# Patient Record
Sex: MALE | Race: WHITE | Employment: OTHER | URBAN - METROPOLITAN AREA
[De-identification: names, ages, dates, MRNs, and addresses within clinical notes are randomized per-mention and may not be internally consistent; named-entity substitution may affect disease eponyms.]

---

## 2017-05-24 ENCOUNTER — RECORDS - HEALTHEAST (OUTPATIENT)
Dept: LAB | Facility: CLINIC | Age: 62
End: 2017-05-24

## 2017-05-25 LAB
ALT SERPL W P-5'-P-CCNC: 15 U/L (ref 0–45)
AST SERPL W P-5'-P-CCNC: 14 U/L (ref 0–40)
CREAT SERPL-MCNC: 0.6 MG/DL (ref 0.7–1.3)
GFR SERPL CREATININE-BSD FRML MDRD: >60 ML/MIN/1.73M2

## 2017-09-13 ENCOUNTER — RECORDS - HEALTHEAST (OUTPATIENT)
Dept: LAB | Facility: CLINIC | Age: 62
End: 2017-09-13

## 2017-09-14 LAB
CHOLEST SERPL-MCNC: 116 MG/DL
FASTING STATUS PATIENT QL REPORTED: NORMAL
HDLC SERPL-MCNC: 42 MG/DL
LDLC SERPL CALC-MCNC: 63 MG/DL
TRIGL SERPL-MCNC: 56 MG/DL

## 2018-01-19 ENCOUNTER — RECORDS - HEALTHEAST (OUTPATIENT)
Dept: LAB | Facility: CLINIC | Age: 63
End: 2018-01-19

## 2018-01-22 LAB — CALCIUM SERPL-MCNC: 9.3 MG/DL (ref 8.5–10.5)

## 2018-01-23 LAB — 25(OH)D3 SERPL-MCNC: 63.8 NG/ML (ref 30–80)

## 2018-02-23 ENCOUNTER — RECORDS - HEALTHEAST (OUTPATIENT)
Dept: LAB | Facility: CLINIC | Age: 63
End: 2018-02-23

## 2018-02-23 LAB
ANION GAP SERPL CALCULATED.3IONS-SCNC: 8 MMOL/L (ref 5–18)
BUN SERPL-MCNC: 14 MG/DL (ref 8–22)
CALCIUM SERPL-MCNC: 9.4 MG/DL (ref 8.5–10.5)
CHLORIDE BLD-SCNC: 102 MMOL/L (ref 98–107)
CO2 SERPL-SCNC: 28 MMOL/L (ref 22–31)
CREAT SERPL-MCNC: 0.62 MG/DL (ref 0.7–1.3)
GFR SERPL CREATININE-BSD FRML MDRD: >60 ML/MIN/1.73M2
GLUCOSE BLD-MCNC: 79 MG/DL (ref 70–125)
HGB BLD-MCNC: 13.4 G/DL (ref 14–18)
POTASSIUM BLD-SCNC: 3.8 MMOL/L (ref 3.5–5)
SODIUM SERPL-SCNC: 138 MMOL/L (ref 136–145)

## 2018-03-15 ENCOUNTER — RECORDS - HEALTHEAST (OUTPATIENT)
Dept: LAB | Facility: CLINIC | Age: 63
End: 2018-03-15

## 2018-03-16 LAB
25(OH)D3 SERPL-MCNC: 57.7 NG/ML (ref 30–80)
CALCIUM SERPL-MCNC: 9.5 MG/DL (ref 8.5–10.5)

## 2018-03-22 NOTE — TELEPHONE ENCOUNTER
"Requested Prescriptions   Pending Prescriptions Disp Refills     cholecalciferol (VITAMIN D3) 5000 UNITS CAPS capsule [Pharmacy Med Name: Vitamin D3 Oral Capsule 5000 UNIT] 100 capsule 4    Last Written Prescription Date:  na  Last Fill Quantity: na,  # refills: na   Last office visit: No previous visit found with prescribing provider:     Future Office Visit:     Sig: TAKE ONE CAPSULE BY MOUTH EVERY DAY IN THE MORNING    Vitamin Supplements (Adult) Protocol Failed    3/22/2018  2:42 PM       Failed - Recent (12 mo) or future (30 days) visit within the authorizing provider's specialty    Patient had office visit in the last 12 months or has a visit in the next 30 days with authorizing provider or within the authorizing provider's specialty.  See \"Patient Info\" tab in inbasket, or \"Choose Columns\" in Meds & Orders section of the refill encounter.           Passed - High dose Vitamin D not ordered        aspirin 81 MG EC tablet [Pharmacy Med Name: Aspirin Oral Tablet Delayed Release 81 MG] 120 tablet 0      Last Written Prescription Date:  na  Last Fill Quantity: na,  # refills: na   Last office visit: No previous visit found with prescribing provider:     Future Office Visit:     Sig: TAKE ONE TABLET BY MOUTH EVERY DAY    Analgesics (Non-Narcotic Tylenol and ASA Only) Failed    3/22/2018  2:42 PM       Failed - Recent (12 mo) or future (30 days) visit within the authorizing provider's specialty    Patient had office visit in the last 12 months or has a visit in the next 30 days with authorizing provider or within the authorizing provider's specialty.  See \"Patient Info\" tab in inbasket, or \"Choose Columns\" in Meds & Orders section of the refill encounter.           Passed - Patient is age 20 years or older    If ASA is flagged for ages under 20 years old. Forward to provider for confirmation Ryes Syndrome is not a concern.                "

## 2018-03-22 NOTE — TELEPHONE ENCOUNTER
"Requested Prescriptions   Pending Prescriptions Disp Refills     cholecalciferol (VITAMIN D3) 5000 UNITS CAPS capsule [Pharmacy Med Name: Vitamin D3 Oral Capsule 5000 UNIT] 100 capsule 4     Sig: TAKE ONE CAPSULE BY MOUTH EVERY DAY IN THE MORNING    Vitamin Supplements (Adult) Protocol Failed    3/22/2018  1:53 PM       Failed - Recent (12 mo) or future (30 days) visit within the authorizing provider's specialty    Patient had office visit in the last 12 months or has a visit in the next 30 days with authorizing provider or within the authorizing provider's specialty.  See \"Patient Info\" tab in inbasket, or \"Choose Columns\" in Meds & Orders section of the refill encounter.           Passed - High dose Vitamin D not ordered        aspirin 81 MG EC tablet [Pharmacy Med Name: Aspirin Oral Tablet Delayed Release 81 MG] 120 tablet 0     Sig: TAKE ONE TABLET BY MOUTH EVERY DAY    Analgesics (Non-Narcotic Tylenol and ASA Only) Failed    3/22/2018  1:53 PM       Failed - Recent (12 mo) or future (30 days) visit within the authorizing provider's specialty    Patient had office visit in the last 12 months or has a visit in the next 30 days with authorizing provider or within the authorizing provider's specialty.  See \"Patient Info\" tab in inbasket, or \"Choose Columns\" in Meds & Orders section of the refill encounter.           Passed - Patient is age 20 years or older    If ASA is flagged for ages under 20 years old. Forward to provider for confirmation Ryes Syndrome is not a concern.                "

## 2018-03-22 NOTE — TELEPHONE ENCOUNTER
This patient has never been seen at our clinic.  Pharmacy advised.    Gloria Amaral, BS, RN, [facility]) [phone]

## 2018-03-23 NOTE — TELEPHONE ENCOUNTER
"Requested Prescriptions   Pending Prescriptions Disp Refills     cholecalciferol (VITAMIN D3) 5000 UNITS CAPS capsule [Pharmacy Med Name: Vitamin D3 Oral Capsule 5000 UNIT] 100 capsule 4     Sig: TAKE ONE CAPSULE BY MOUTH EVERY DAY IN THE MORNING    Last Refill: N/A    Vitamin Supplements (Adult) Protocol Failed    3/23/2018  1:45 PM       Failed - Recent (12 mo) or future (30 days) visit within the authorizing provider's specialty    Patient had office visit in the last 12 months or has a visit in the next 30 days with authorizing provider or within the authorizing provider's specialty.  See \"Patient Info\" tab in inbasket, or \"Choose Columns\" in Meds & Orders section of the refill encounter.           Passed - High dose Vitamin D not ordered        This patient has never been seen at at this clinic - pharmacy notified    Chelsey Wilson RN  [place] [facility]    "

## 2018-03-26 ENCOUNTER — RECORDS - HEALTHEAST (OUTPATIENT)
Dept: LAB | Facility: CLINIC | Age: 63
End: 2018-03-26

## 2018-03-26 LAB
ALT SERPL W P-5'-P-CCNC: 80 U/L (ref 0–45)
AST SERPL W P-5'-P-CCNC: 43 U/L (ref 0–40)
BASOPHILS # BLD AUTO: 0 THOU/UL (ref 0–0.2)
BASOPHILS NFR BLD AUTO: 1 % (ref 0–2)
CREAT SERPL-MCNC: 0.58 MG/DL (ref 0.7–1.3)
EOSINOPHIL # BLD AUTO: 0.3 THOU/UL (ref 0–0.4)
EOSINOPHIL NFR BLD AUTO: 6 % (ref 0–6)
ERYTHROCYTE [DISTWIDTH] IN BLOOD BY AUTOMATED COUNT: 14.8 % (ref 11–14.5)
GFR SERPL CREATININE-BSD FRML MDRD: >60 ML/MIN/1.73M2
HCT VFR BLD AUTO: 40.6 % (ref 40–54)
HGB BLD-MCNC: 13.2 G/DL (ref 14–18)
LYMPHOCYTES # BLD AUTO: 1.1 THOU/UL (ref 0.8–4.4)
LYMPHOCYTES NFR BLD AUTO: 23 % (ref 20–40)
MCH RBC QN AUTO: 30.4 PG (ref 27–34)
MCHC RBC AUTO-ENTMCNC: 32.5 G/DL (ref 32–36)
MCV RBC AUTO: 94 FL (ref 80–100)
MONOCYTES # BLD AUTO: 0.6 THOU/UL (ref 0–0.9)
MONOCYTES NFR BLD AUTO: 12 % (ref 2–10)
NEUTROPHILS # BLD AUTO: 2.9 THOU/UL (ref 2–7.7)
NEUTROPHILS NFR BLD AUTO: 59 % (ref 50–70)
PLATELET # BLD AUTO: 232 THOU/UL (ref 140–440)
PMV BLD AUTO: 9.5 FL (ref 8.5–12.5)
RBC # BLD AUTO: 4.34 MILL/UL (ref 4.4–6.2)
WBC: 5 THOU/UL (ref 4–11)

## 2018-05-31 ENCOUNTER — RECORDS - HEALTHEAST (OUTPATIENT)
Dept: LAB | Facility: CLINIC | Age: 63
End: 2018-05-31

## 2018-06-01 LAB
ANION GAP SERPL CALCULATED.3IONS-SCNC: 10 MMOL/L (ref 5–18)
BASOPHILS # BLD AUTO: 0.1 THOU/UL (ref 0–0.2)
BASOPHILS NFR BLD AUTO: 1 % (ref 0–2)
BUN SERPL-MCNC: 16 MG/DL (ref 8–22)
CALCIUM SERPL-MCNC: 9.6 MG/DL (ref 8.5–10.5)
CHLORIDE BLD-SCNC: 98 MMOL/L (ref 98–107)
CO2 SERPL-SCNC: 29 MMOL/L (ref 22–31)
CREAT SERPL-MCNC: 0.66 MG/DL (ref 0.7–1.3)
EOSINOPHIL # BLD AUTO: 0.2 THOU/UL (ref 0–0.4)
EOSINOPHIL NFR BLD AUTO: 3 % (ref 0–6)
ERYTHROCYTE [DISTWIDTH] IN BLOOD BY AUTOMATED COUNT: 15.3 % (ref 11–14.5)
GFR SERPL CREATININE-BSD FRML MDRD: >60 ML/MIN/1.73M2
GLUCOSE BLD-MCNC: 136 MG/DL (ref 70–125)
HCT VFR BLD AUTO: 39.1 % (ref 40–54)
HGB BLD-MCNC: 12.3 G/DL (ref 14–18)
LYMPHOCYTES # BLD AUTO: 1 THOU/UL (ref 0.8–4.4)
LYMPHOCYTES NFR BLD AUTO: 13 % (ref 20–40)
MCH RBC QN AUTO: 29.4 PG (ref 27–34)
MCHC RBC AUTO-ENTMCNC: 31.5 G/DL (ref 32–36)
MCV RBC AUTO: 93 FL (ref 80–100)
MONOCYTES # BLD AUTO: 0.9 THOU/UL (ref 0–0.9)
MONOCYTES NFR BLD AUTO: 12 % (ref 2–10)
NEUTROPHILS # BLD AUTO: 5.2 THOU/UL (ref 2–7.7)
NEUTROPHILS NFR BLD AUTO: 72 % (ref 50–70)
PLATELET # BLD AUTO: 493 THOU/UL (ref 140–440)
PMV BLD AUTO: 9.3 FL (ref 8.5–12.5)
POTASSIUM BLD-SCNC: 4.2 MMOL/L (ref 3.5–5)
RBC # BLD AUTO: 4.19 MILL/UL (ref 4.4–6.2)
SODIUM SERPL-SCNC: 137 MMOL/L (ref 136–145)
WBC: 7.5 THOU/UL (ref 4–11)

## 2018-06-06 ENCOUNTER — RECORDS - HEALTHEAST (OUTPATIENT)
Dept: LAB | Facility: CLINIC | Age: 63
End: 2018-06-06

## 2018-06-06 LAB
ANION GAP SERPL CALCULATED.3IONS-SCNC: 9 MMOL/L (ref 5–18)
BUN SERPL-MCNC: 14 MG/DL (ref 8–22)
CALCIUM SERPL-MCNC: 9.6 MG/DL (ref 8.5–10.5)
CHLORIDE BLD-SCNC: 103 MMOL/L (ref 98–107)
CO2 SERPL-SCNC: 25 MMOL/L (ref 22–31)
CREAT SERPL-MCNC: 0.62 MG/DL (ref 0.7–1.3)
GFR SERPL CREATININE-BSD FRML MDRD: >60 ML/MIN/1.73M2
GLUCOSE BLD-MCNC: 93 MG/DL (ref 70–125)
POTASSIUM BLD-SCNC: 4.1 MMOL/L (ref 3.5–5)
SODIUM SERPL-SCNC: 137 MMOL/L (ref 136–145)

## 2018-06-12 ENCOUNTER — RECORDS - HEALTHEAST (OUTPATIENT)
Dept: LAB | Facility: CLINIC | Age: 63
End: 2018-06-12

## 2018-06-13 ENCOUNTER — RECORDS - HEALTHEAST (OUTPATIENT)
Dept: LAB | Facility: CLINIC | Age: 63
End: 2018-06-13

## 2018-06-13 LAB
25(OH)D3 SERPL-MCNC: 45.8 NG/ML (ref 30–80)
ANION GAP SERPL CALCULATED.3IONS-SCNC: 8 MMOL/L (ref 5–18)
BUN SERPL-MCNC: 13 MG/DL (ref 8–22)
CALCIUM SERPL-MCNC: 9.6 MG/DL (ref 8.5–10.5)
CHLORIDE BLD-SCNC: 101 MMOL/L (ref 98–107)
CO2 SERPL-SCNC: 28 MMOL/L (ref 22–31)
CREAT SERPL-MCNC: 0.59 MG/DL (ref 0.7–1.3)
GFR SERPL CREATININE-BSD FRML MDRD: >60 ML/MIN/1.73M2
GLUCOSE BLD-MCNC: 131 MG/DL (ref 70–125)
POTASSIUM BLD-SCNC: 4.3 MMOL/L (ref 3.5–5)
SODIUM SERPL-SCNC: 137 MMOL/L (ref 136–145)

## 2018-06-19 ENCOUNTER — RECORDS - HEALTHEAST (OUTPATIENT)
Dept: LAB | Facility: CLINIC | Age: 63
End: 2018-06-19

## 2018-06-20 LAB
ANION GAP SERPL CALCULATED.3IONS-SCNC: 8 MMOL/L (ref 5–18)
BUN SERPL-MCNC: 16 MG/DL (ref 8–22)
CALCIUM SERPL-MCNC: 9.4 MG/DL (ref 8.5–10.5)
CHLORIDE BLD-SCNC: 101 MMOL/L (ref 98–107)
CO2 SERPL-SCNC: 29 MMOL/L (ref 22–31)
CREAT SERPL-MCNC: 0.58 MG/DL (ref 0.7–1.3)
GFR SERPL CREATININE-BSD FRML MDRD: >60 ML/MIN/1.73M2
GLUCOSE BLD-MCNC: 121 MG/DL (ref 70–125)
POTASSIUM BLD-SCNC: 4.1 MMOL/L (ref 3.5–5)
SODIUM SERPL-SCNC: 138 MMOL/L (ref 136–145)

## 2018-06-27 ENCOUNTER — RECORDS - HEALTHEAST (OUTPATIENT)
Dept: LAB | Facility: CLINIC | Age: 63
End: 2018-06-27

## 2018-06-27 LAB
ANION GAP SERPL CALCULATED.3IONS-SCNC: 8 MMOL/L (ref 5–18)
BUN SERPL-MCNC: 12 MG/DL (ref 8–22)
CALCIUM SERPL-MCNC: 9.6 MG/DL (ref 8.5–10.5)
CHLORIDE BLD-SCNC: 100 MMOL/L (ref 98–107)
CO2 SERPL-SCNC: 27 MMOL/L (ref 22–31)
CREAT SERPL-MCNC: 0.61 MG/DL (ref 0.7–1.3)
GFR SERPL CREATININE-BSD FRML MDRD: >60 ML/MIN/1.73M2
GLUCOSE BLD-MCNC: 86 MG/DL (ref 70–125)
POTASSIUM BLD-SCNC: 4.2 MMOL/L (ref 3.5–5)
SODIUM SERPL-SCNC: 135 MMOL/L (ref 136–145)

## 2018-07-02 ENCOUNTER — RECORDS - HEALTHEAST (OUTPATIENT)
Dept: LAB | Facility: CLINIC | Age: 63
End: 2018-07-02

## 2018-07-03 LAB
ANION GAP SERPL CALCULATED.3IONS-SCNC: 8 MMOL/L (ref 5–18)
BUN SERPL-MCNC: 13 MG/DL (ref 8–22)
CALCIUM SERPL-MCNC: 9.6 MG/DL (ref 8.5–10.5)
CHLORIDE BLD-SCNC: 100 MMOL/L (ref 98–107)
CO2 SERPL-SCNC: 26 MMOL/L (ref 22–31)
CREAT SERPL-MCNC: 0.57 MG/DL (ref 0.7–1.3)
GFR SERPL CREATININE-BSD FRML MDRD: >60 ML/MIN/1.73M2
GLUCOSE BLD-MCNC: 101 MG/DL (ref 70–125)
POTASSIUM BLD-SCNC: 4.1 MMOL/L (ref 3.5–5)
SODIUM SERPL-SCNC: 134 MMOL/L (ref 136–145)

## 2018-07-10 ENCOUNTER — RECORDS - HEALTHEAST (OUTPATIENT)
Dept: LAB | Facility: CLINIC | Age: 63
End: 2018-07-10

## 2018-07-11 LAB
ANION GAP SERPL CALCULATED.3IONS-SCNC: 8 MMOL/L (ref 5–18)
BUN SERPL-MCNC: 13 MG/DL (ref 8–22)
CALCIUM SERPL-MCNC: 9.4 MG/DL (ref 8.5–10.5)
CHLORIDE BLD-SCNC: 100 MMOL/L (ref 98–107)
CO2 SERPL-SCNC: 27 MMOL/L (ref 22–31)
CREAT SERPL-MCNC: 0.54 MG/DL (ref 0.7–1.3)
GFR SERPL CREATININE-BSD FRML MDRD: >60 ML/MIN/1.73M2
GLUCOSE BLD-MCNC: 91 MG/DL (ref 70–125)
POTASSIUM BLD-SCNC: 4.2 MMOL/L (ref 3.5–5)
SODIUM SERPL-SCNC: 135 MMOL/L (ref 136–145)

## 2018-07-17 ENCOUNTER — RECORDS - HEALTHEAST (OUTPATIENT)
Dept: LAB | Facility: CLINIC | Age: 63
End: 2018-07-17

## 2018-07-18 LAB
ANION GAP SERPL CALCULATED.3IONS-SCNC: 9 MMOL/L (ref 5–18)
BUN SERPL-MCNC: 12 MG/DL (ref 8–22)
CALCIUM SERPL-MCNC: 9.6 MG/DL (ref 8.5–10.5)
CHLORIDE BLD-SCNC: 97 MMOL/L (ref 98–107)
CO2 SERPL-SCNC: 26 MMOL/L (ref 22–31)
CREAT SERPL-MCNC: 0.59 MG/DL (ref 0.7–1.3)
GFR SERPL CREATININE-BSD FRML MDRD: >60 ML/MIN/1.73M2
GLUCOSE BLD-MCNC: 101 MG/DL (ref 70–125)
POTASSIUM BLD-SCNC: 4.2 MMOL/L (ref 3.5–5)
SODIUM SERPL-SCNC: 132 MMOL/L (ref 136–145)

## 2018-07-24 ENCOUNTER — RECORDS - HEALTHEAST (OUTPATIENT)
Dept: LAB | Facility: CLINIC | Age: 63
End: 2018-07-24

## 2018-07-25 LAB
ANION GAP SERPL CALCULATED.3IONS-SCNC: 8 MMOL/L (ref 5–18)
BUN SERPL-MCNC: 11 MG/DL (ref 8–22)
CALCIUM SERPL-MCNC: 9.5 MG/DL (ref 8.5–10.5)
CHLORIDE BLD-SCNC: 99 MMOL/L (ref 98–107)
CO2 SERPL-SCNC: 27 MMOL/L (ref 22–31)
CREAT SERPL-MCNC: 0.56 MG/DL (ref 0.7–1.3)
GFR SERPL CREATININE-BSD FRML MDRD: >60 ML/MIN/1.73M2
GLUCOSE BLD-MCNC: 90 MG/DL (ref 70–125)
POTASSIUM BLD-SCNC: 3.9 MMOL/L (ref 3.5–5)
SODIUM SERPL-SCNC: 134 MMOL/L (ref 136–145)

## 2018-07-31 ENCOUNTER — RECORDS - HEALTHEAST (OUTPATIENT)
Dept: LAB | Facility: CLINIC | Age: 63
End: 2018-07-31

## 2018-08-01 LAB
ANION GAP SERPL CALCULATED.3IONS-SCNC: 7 MMOL/L (ref 5–18)
BUN SERPL-MCNC: 11 MG/DL (ref 8–22)
CALCIUM SERPL-MCNC: 9.4 MG/DL (ref 8.5–10.5)
CHLORIDE BLD-SCNC: 100 MMOL/L (ref 98–107)
CO2 SERPL-SCNC: 28 MMOL/L (ref 22–31)
CREAT SERPL-MCNC: 0.58 MG/DL (ref 0.7–1.3)
GFR SERPL CREATININE-BSD FRML MDRD: >60 ML/MIN/1.73M2
GLUCOSE BLD-MCNC: 84 MG/DL (ref 70–125)
POTASSIUM BLD-SCNC: 4.4 MMOL/L (ref 3.5–5)
SODIUM SERPL-SCNC: 135 MMOL/L (ref 136–145)

## 2018-08-07 ENCOUNTER — RECORDS - HEALTHEAST (OUTPATIENT)
Dept: LAB | Facility: CLINIC | Age: 63
End: 2018-08-07

## 2018-08-08 LAB
ANION GAP SERPL CALCULATED.3IONS-SCNC: 10 MMOL/L (ref 5–18)
BUN SERPL-MCNC: 8 MG/DL (ref 8–22)
CALCIUM SERPL-MCNC: 9.5 MG/DL (ref 8.5–10.5)
CHLORIDE BLD-SCNC: 100 MMOL/L (ref 98–107)
CO2 SERPL-SCNC: 26 MMOL/L (ref 22–31)
CREAT SERPL-MCNC: 0.58 MG/DL (ref 0.7–1.3)
GFR SERPL CREATININE-BSD FRML MDRD: >60 ML/MIN/1.73M2
GLUCOSE BLD-MCNC: 89 MG/DL (ref 70–125)
POTASSIUM BLD-SCNC: 4 MMOL/L (ref 3.5–5)
SODIUM SERPL-SCNC: 136 MMOL/L (ref 136–145)

## 2018-08-14 ENCOUNTER — RECORDS - HEALTHEAST (OUTPATIENT)
Dept: LAB | Facility: CLINIC | Age: 63
End: 2018-08-14

## 2018-08-15 LAB
ANION GAP SERPL CALCULATED.3IONS-SCNC: 10 MMOL/L (ref 5–18)
BUN SERPL-MCNC: 9 MG/DL (ref 8–22)
CALCIUM SERPL-MCNC: 9.3 MG/DL (ref 8.5–10.5)
CHLORIDE BLD-SCNC: 102 MMOL/L (ref 98–107)
CO2 SERPL-SCNC: 25 MMOL/L (ref 22–31)
CREAT SERPL-MCNC: 0.55 MG/DL (ref 0.7–1.3)
GFR SERPL CREATININE-BSD FRML MDRD: >60 ML/MIN/1.73M2
GLUCOSE BLD-MCNC: 90 MG/DL (ref 70–125)
POTASSIUM BLD-SCNC: 3.9 MMOL/L (ref 3.5–5)
SODIUM SERPL-SCNC: 137 MMOL/L (ref 136–145)

## 2018-08-21 ENCOUNTER — RECORDS - HEALTHEAST (OUTPATIENT)
Dept: LAB | Facility: CLINIC | Age: 63
End: 2018-08-21

## 2018-08-22 LAB
ANION GAP SERPL CALCULATED.3IONS-SCNC: 8 MMOL/L (ref 5–18)
BUN SERPL-MCNC: 11 MG/DL (ref 8–22)
CALCIUM SERPL-MCNC: 9.5 MG/DL (ref 8.5–10.5)
CHLORIDE BLD-SCNC: 101 MMOL/L (ref 98–107)
CO2 SERPL-SCNC: 27 MMOL/L (ref 22–31)
CREAT SERPL-MCNC: 0.62 MG/DL (ref 0.7–1.3)
GFR SERPL CREATININE-BSD FRML MDRD: >60 ML/MIN/1.73M2
GLUCOSE BLD-MCNC: 92 MG/DL (ref 70–125)
POTASSIUM BLD-SCNC: 4.3 MMOL/L (ref 3.5–5)
SODIUM SERPL-SCNC: 136 MMOL/L (ref 136–145)

## 2018-08-28 ENCOUNTER — TRANSFERRED RECORDS (OUTPATIENT)
Dept: HEALTH INFORMATION MANAGEMENT | Facility: CLINIC | Age: 63
End: 2018-08-28

## 2018-08-28 ENCOUNTER — RECORDS - HEALTHEAST (OUTPATIENT)
Dept: LAB | Facility: CLINIC | Age: 63
End: 2018-08-28

## 2018-08-29 LAB
ANION GAP SERPL CALCULATED.3IONS-SCNC: 11 MMOL/L (ref 5–18)
BUN SERPL-MCNC: 9 MG/DL (ref 8–22)
CALCIUM SERPL-MCNC: 9.7 MG/DL (ref 8.5–10.5)
CHLORIDE BLD-SCNC: 100 MMOL/L (ref 98–107)
CO2 SERPL-SCNC: 26 MMOL/L (ref 22–31)
CREAT SERPL-MCNC: 0.56 MG/DL (ref 0.7–1.3)
GFR SERPL CREATININE-BSD FRML MDRD: >60 ML/MIN/1.73M2
GLUCOSE BLD-MCNC: 82 MG/DL (ref 70–125)
POTASSIUM BLD-SCNC: 4 MMOL/L (ref 3.5–5)
SODIUM SERPL-SCNC: 137 MMOL/L (ref 136–145)

## 2018-09-04 ENCOUNTER — RECORDS - HEALTHEAST (OUTPATIENT)
Dept: LAB | Facility: CLINIC | Age: 63
End: 2018-09-04

## 2018-09-05 LAB
ANION GAP SERPL CALCULATED.3IONS-SCNC: 8 MMOL/L (ref 5–18)
BUN SERPL-MCNC: 9 MG/DL (ref 8–22)
CALCIUM SERPL-MCNC: 9.6 MG/DL (ref 8.5–10.5)
CHLORIDE BLD-SCNC: 101 MMOL/L (ref 98–107)
CO2 SERPL-SCNC: 27 MMOL/L (ref 22–31)
CREAT SERPL-MCNC: 0.57 MG/DL (ref 0.7–1.3)
GFR SERPL CREATININE-BSD FRML MDRD: >60 ML/MIN/1.73M2
GLUCOSE BLD-MCNC: 89 MG/DL (ref 70–125)
POTASSIUM BLD-SCNC: 3.9 MMOL/L (ref 3.5–5)
SODIUM SERPL-SCNC: 136 MMOL/L (ref 136–145)

## 2018-09-13 ENCOUNTER — RECORDS - HEALTHEAST (OUTPATIENT)
Dept: LAB | Facility: CLINIC | Age: 63
End: 2018-09-13

## 2018-09-13 LAB
25(OH)D3 SERPL-MCNC: 66.2 NG/ML (ref 30–80)
ANION GAP SERPL CALCULATED.3IONS-SCNC: 8 MMOL/L (ref 5–18)
BUN SERPL-MCNC: 11 MG/DL (ref 8–22)
CALCIUM SERPL-MCNC: 9.6 MG/DL (ref 8.5–10.5)
CALCIUM SERPL-MCNC: 9.6 MG/DL (ref 8.5–10.5)
CHLORIDE BLD-SCNC: 102 MMOL/L (ref 98–107)
CO2 SERPL-SCNC: 28 MMOL/L (ref 22–31)
CREAT SERPL-MCNC: 0.55 MG/DL (ref 0.7–1.3)
GFR SERPL CREATININE-BSD FRML MDRD: >60 ML/MIN/1.73M2
GLUCOSE BLD-MCNC: 92 MG/DL (ref 70–125)
POTASSIUM BLD-SCNC: 3.9 MMOL/L (ref 3.5–5)
SODIUM SERPL-SCNC: 138 MMOL/L (ref 136–145)

## 2018-09-18 ENCOUNTER — RECORDS - HEALTHEAST (OUTPATIENT)
Dept: LAB | Facility: CLINIC | Age: 63
End: 2018-09-18

## 2018-09-19 LAB
ANION GAP SERPL CALCULATED.3IONS-SCNC: 5 MMOL/L (ref 5–18)
BUN SERPL-MCNC: 9 MG/DL (ref 8–22)
CALCIUM SERPL-MCNC: 9.7 MG/DL (ref 8.5–10.5)
CHLORIDE BLD-SCNC: 100 MMOL/L (ref 98–107)
CO2 SERPL-SCNC: 32 MMOL/L (ref 22–31)
CREAT SERPL-MCNC: 0.6 MG/DL (ref 0.7–1.3)
GFR SERPL CREATININE-BSD FRML MDRD: >60 ML/MIN/1.73M2
GLUCOSE BLD-MCNC: 103 MG/DL (ref 70–125)
POTASSIUM BLD-SCNC: 4.6 MMOL/L (ref 3.5–5)
SODIUM SERPL-SCNC: 137 MMOL/L (ref 136–145)

## 2018-09-25 ENCOUNTER — RECORDS - HEALTHEAST (OUTPATIENT)
Dept: LAB | Facility: CLINIC | Age: 63
End: 2018-09-25

## 2018-09-26 LAB
ALT SERPL W P-5'-P-CCNC: 57 U/L (ref 0–45)
ANION GAP SERPL CALCULATED.3IONS-SCNC: 8 MMOL/L (ref 5–18)
AST SERPL W P-5'-P-CCNC: 41 U/L (ref 0–40)
BASOPHILS # BLD AUTO: 0.1 THOU/UL (ref 0–0.2)
BASOPHILS NFR BLD AUTO: 1 % (ref 0–2)
BUN SERPL-MCNC: 9 MG/DL (ref 8–22)
CALCIUM SERPL-MCNC: 9.4 MG/DL (ref 8.5–10.5)
CHLORIDE BLD-SCNC: 101 MMOL/L (ref 98–107)
CO2 SERPL-SCNC: 25 MMOL/L (ref 22–31)
CREAT SERPL-MCNC: 0.58 MG/DL (ref 0.7–1.3)
EOSINOPHIL # BLD AUTO: 0.2 THOU/UL (ref 0–0.4)
EOSINOPHIL NFR BLD AUTO: 4 % (ref 0–6)
ERYTHROCYTE [DISTWIDTH] IN BLOOD BY AUTOMATED COUNT: 14.8 % (ref 11–14.5)
GFR SERPL CREATININE-BSD FRML MDRD: >60 ML/MIN/1.73M2
GLUCOSE BLD-MCNC: 94 MG/DL (ref 70–125)
HCT VFR BLD AUTO: 41.5 % (ref 40–54)
HGB BLD-MCNC: 13.5 G/DL (ref 14–18)
LYMPHOCYTES # BLD AUTO: 0.8 THOU/UL (ref 0.8–4.4)
LYMPHOCYTES NFR BLD AUTO: 18 % (ref 20–40)
MCH RBC QN AUTO: 29.5 PG (ref 27–34)
MCHC RBC AUTO-ENTMCNC: 32.5 G/DL (ref 32–36)
MCV RBC AUTO: 91 FL (ref 80–100)
MONOCYTES # BLD AUTO: 0.4 THOU/UL (ref 0–0.9)
MONOCYTES NFR BLD AUTO: 9 % (ref 2–10)
NEUTROPHILS # BLD AUTO: 3.3 THOU/UL (ref 2–7.7)
NEUTROPHILS NFR BLD AUTO: 68 % (ref 50–70)
PLATELET # BLD AUTO: 263 THOU/UL (ref 140–440)
PMV BLD AUTO: 8.8 FL (ref 8.5–12.5)
POTASSIUM BLD-SCNC: 4.3 MMOL/L (ref 3.5–5)
RBC # BLD AUTO: 4.57 MILL/UL (ref 4.4–6.2)
SODIUM SERPL-SCNC: 134 MMOL/L (ref 136–145)
WBC: 4.8 THOU/UL (ref 4–11)

## 2018-10-02 ENCOUNTER — RECORDS - HEALTHEAST (OUTPATIENT)
Dept: LAB | Facility: CLINIC | Age: 63
End: 2018-10-02

## 2018-10-03 LAB
ANION GAP SERPL CALCULATED.3IONS-SCNC: 10 MMOL/L (ref 5–18)
BUN SERPL-MCNC: 8 MG/DL (ref 8–22)
CALCIUM SERPL-MCNC: 9.4 MG/DL (ref 8.5–10.5)
CHLORIDE BLD-SCNC: 102 MMOL/L (ref 98–107)
CO2 SERPL-SCNC: 25 MMOL/L (ref 22–31)
CREAT SERPL-MCNC: 0.56 MG/DL (ref 0.7–1.3)
GFR SERPL CREATININE-BSD FRML MDRD: >60 ML/MIN/1.73M2
GLUCOSE BLD-MCNC: 85 MG/DL (ref 70–125)
POTASSIUM BLD-SCNC: 4 MMOL/L (ref 3.5–5)
SODIUM SERPL-SCNC: 137 MMOL/L (ref 136–145)

## 2018-10-10 ENCOUNTER — RECORDS - HEALTHEAST (OUTPATIENT)
Dept: LAB | Facility: CLINIC | Age: 63
End: 2018-10-10

## 2018-10-10 LAB
ANION GAP SERPL CALCULATED.3IONS-SCNC: 6 MMOL/L (ref 5–18)
BUN SERPL-MCNC: 10 MG/DL (ref 8–22)
CALCIUM SERPL-MCNC: 9.6 MG/DL (ref 8.5–10.5)
CHLORIDE BLD-SCNC: 99 MMOL/L (ref 98–107)
CO2 SERPL-SCNC: 28 MMOL/L (ref 22–31)
CREAT SERPL-MCNC: 0.55 MG/DL (ref 0.7–1.3)
GFR SERPL CREATININE-BSD FRML MDRD: >60 ML/MIN/1.73M2
GLUCOSE BLD-MCNC: 86 MG/DL (ref 70–125)
POTASSIUM BLD-SCNC: 4 MMOL/L (ref 3.5–5)
SODIUM SERPL-SCNC: 133 MMOL/L (ref 136–145)

## 2018-10-17 ENCOUNTER — RECORDS - HEALTHEAST (OUTPATIENT)
Dept: LAB | Facility: CLINIC | Age: 63
End: 2018-10-17

## 2018-10-17 LAB
ANION GAP SERPL CALCULATED.3IONS-SCNC: 9 MMOL/L (ref 5–18)
BUN SERPL-MCNC: 11 MG/DL (ref 8–22)
CALCIUM SERPL-MCNC: 9.6 MG/DL (ref 8.5–10.5)
CHLORIDE BLD-SCNC: 101 MMOL/L (ref 98–107)
CO2 SERPL-SCNC: 26 MMOL/L (ref 22–31)
CREAT SERPL-MCNC: 0.55 MG/DL (ref 0.7–1.3)
GFR SERPL CREATININE-BSD FRML MDRD: >60 ML/MIN/1.73M2
GLUCOSE BLD-MCNC: 89 MG/DL (ref 70–125)
POTASSIUM BLD-SCNC: 4 MMOL/L (ref 3.5–5)
SODIUM SERPL-SCNC: 136 MMOL/L (ref 136–145)

## 2018-10-24 ENCOUNTER — RECORDS - HEALTHEAST (OUTPATIENT)
Dept: LAB | Facility: CLINIC | Age: 63
End: 2018-10-24

## 2018-10-24 LAB
ANION GAP SERPL CALCULATED.3IONS-SCNC: 10 MMOL/L (ref 5–18)
BUN SERPL-MCNC: 11 MG/DL (ref 8–22)
CALCIUM SERPL-MCNC: 9.2 MG/DL (ref 8.5–10.5)
CHLORIDE BLD-SCNC: 101 MMOL/L (ref 98–107)
CO2 SERPL-SCNC: 25 MMOL/L (ref 22–31)
CREAT SERPL-MCNC: 0.54 MG/DL (ref 0.7–1.3)
GFR SERPL CREATININE-BSD FRML MDRD: >60 ML/MIN/1.73M2
GLUCOSE BLD-MCNC: 80 MG/DL (ref 70–125)
POTASSIUM BLD-SCNC: 3.9 MMOL/L (ref 3.5–5)
SODIUM SERPL-SCNC: 136 MMOL/L (ref 136–145)

## 2018-10-30 ENCOUNTER — RECORDS - HEALTHEAST (OUTPATIENT)
Dept: LAB | Facility: CLINIC | Age: 63
End: 2018-10-30

## 2018-10-31 LAB
ANION GAP SERPL CALCULATED.3IONS-SCNC: 8 MMOL/L (ref 5–18)
BUN SERPL-MCNC: 10 MG/DL (ref 8–22)
CALCIUM SERPL-MCNC: 9.4 MG/DL (ref 8.5–10.5)
CHLORIDE BLD-SCNC: 100 MMOL/L (ref 98–107)
CO2 SERPL-SCNC: 28 MMOL/L (ref 22–31)
CREAT SERPL-MCNC: 0.57 MG/DL (ref 0.7–1.3)
GFR SERPL CREATININE-BSD FRML MDRD: >60 ML/MIN/1.73M2
GLUCOSE BLD-MCNC: 81 MG/DL (ref 70–125)
POTASSIUM BLD-SCNC: 4.1 MMOL/L (ref 3.5–5)
SODIUM SERPL-SCNC: 136 MMOL/L (ref 136–145)

## 2018-11-06 ENCOUNTER — RECORDS - HEALTHEAST (OUTPATIENT)
Dept: LAB | Facility: CLINIC | Age: 63
End: 2018-11-06

## 2018-11-07 LAB
ANION GAP SERPL CALCULATED.3IONS-SCNC: 10 MMOL/L (ref 5–18)
BUN SERPL-MCNC: 12 MG/DL (ref 8–22)
CALCIUM SERPL-MCNC: 9.6 MG/DL (ref 8.5–10.5)
CHLORIDE BLD-SCNC: 100 MMOL/L (ref 98–107)
CO2 SERPL-SCNC: 28 MMOL/L (ref 22–31)
CREAT SERPL-MCNC: 0.65 MG/DL (ref 0.7–1.3)
GFR SERPL CREATININE-BSD FRML MDRD: >60 ML/MIN/1.73M2
GLUCOSE BLD-MCNC: 72 MG/DL (ref 70–125)
POTASSIUM BLD-SCNC: 5.1 MMOL/L (ref 3.5–5)
SODIUM SERPL-SCNC: 138 MMOL/L (ref 136–145)

## 2018-11-14 ENCOUNTER — RECORDS - HEALTHEAST (OUTPATIENT)
Dept: LAB | Facility: CLINIC | Age: 63
End: 2018-11-14

## 2018-11-14 LAB
ANION GAP SERPL CALCULATED.3IONS-SCNC: 8 MMOL/L (ref 5–18)
BUN SERPL-MCNC: 11 MG/DL (ref 8–22)
CALCIUM SERPL-MCNC: 9.4 MG/DL (ref 8.5–10.5)
CHLORIDE BLD-SCNC: 101 MMOL/L (ref 98–107)
CO2 SERPL-SCNC: 28 MMOL/L (ref 22–31)
CREAT SERPL-MCNC: 0.62 MG/DL (ref 0.7–1.3)
GFR SERPL CREATININE-BSD FRML MDRD: >60 ML/MIN/1.73M2
GLUCOSE BLD-MCNC: 81 MG/DL (ref 70–125)
POTASSIUM BLD-SCNC: 4.4 MMOL/L (ref 3.5–5)
SODIUM SERPL-SCNC: 137 MMOL/L (ref 136–145)

## 2018-11-20 ENCOUNTER — RECORDS - HEALTHEAST (OUTPATIENT)
Dept: LAB | Facility: CLINIC | Age: 63
End: 2018-11-20

## 2018-11-21 LAB
ANION GAP SERPL CALCULATED.3IONS-SCNC: 9 MMOL/L (ref 5–18)
BUN SERPL-MCNC: 9 MG/DL (ref 8–22)
CALCIUM SERPL-MCNC: 9.7 MG/DL (ref 8.5–10.5)
CHLORIDE BLD-SCNC: 97 MMOL/L (ref 98–107)
CO2 SERPL-SCNC: 29 MMOL/L (ref 22–31)
CREAT SERPL-MCNC: 0.6 MG/DL (ref 0.7–1.3)
GFR SERPL CREATININE-BSD FRML MDRD: >60 ML/MIN/1.73M2
GLUCOSE BLD-MCNC: 88 MG/DL (ref 70–125)
POTASSIUM BLD-SCNC: 4.3 MMOL/L (ref 3.5–5)
SODIUM SERPL-SCNC: 135 MMOL/L (ref 136–145)

## 2018-11-27 ENCOUNTER — RECORDS - HEALTHEAST (OUTPATIENT)
Dept: LAB | Facility: CLINIC | Age: 63
End: 2018-11-27

## 2018-11-28 LAB
ANION GAP SERPL CALCULATED.3IONS-SCNC: 7 MMOL/L (ref 5–18)
BUN SERPL-MCNC: 10 MG/DL (ref 8–22)
CALCIUM SERPL-MCNC: 9.3 MG/DL (ref 8.5–10.5)
CHLORIDE BLD-SCNC: 100 MMOL/L (ref 98–107)
CO2 SERPL-SCNC: 27 MMOL/L (ref 22–31)
CREAT SERPL-MCNC: 0.57 MG/DL (ref 0.7–1.3)
GFR SERPL CREATININE-BSD FRML MDRD: >60 ML/MIN/1.73M2
GLUCOSE BLD-MCNC: 92 MG/DL (ref 70–125)
POTASSIUM BLD-SCNC: 4.1 MMOL/L (ref 3.5–5)
SODIUM SERPL-SCNC: 134 MMOL/L (ref 136–145)

## 2018-12-05 ENCOUNTER — RECORDS - HEALTHEAST (OUTPATIENT)
Dept: LAB | Facility: CLINIC | Age: 63
End: 2018-12-05

## 2018-12-05 LAB
ANION GAP SERPL CALCULATED.3IONS-SCNC: 9 MMOL/L (ref 5–18)
BUN SERPL-MCNC: 11 MG/DL (ref 8–22)
CALCIUM SERPL-MCNC: 9.5 MG/DL (ref 8.5–10.5)
CHLORIDE BLD-SCNC: 100 MMOL/L (ref 98–107)
CO2 SERPL-SCNC: 26 MMOL/L (ref 22–31)
CREAT SERPL-MCNC: 0.51 MG/DL (ref 0.7–1.3)
GFR SERPL CREATININE-BSD FRML MDRD: >60 ML/MIN/1.73M2
GLUCOSE BLD-MCNC: 87 MG/DL (ref 70–125)
POTASSIUM BLD-SCNC: 3.6 MMOL/L (ref 3.5–5)
SODIUM SERPL-SCNC: 135 MMOL/L (ref 136–145)

## 2018-12-11 ENCOUNTER — RECORDS - HEALTHEAST (OUTPATIENT)
Dept: LAB | Facility: CLINIC | Age: 63
End: 2018-12-11

## 2018-12-12 LAB
25(OH)D3 SERPL-MCNC: 58.6 NG/ML (ref 30–80)
ANION GAP SERPL CALCULATED.3IONS-SCNC: 8 MMOL/L (ref 5–18)
BUN SERPL-MCNC: 10 MG/DL (ref 8–22)
CALCIUM SERPL-MCNC: 9.2 MG/DL (ref 8.5–10.5)
CALCIUM SERPL-MCNC: 9.4 MG/DL (ref 8.5–10.5)
CHLORIDE BLD-SCNC: 101 MMOL/L (ref 98–107)
CO2 SERPL-SCNC: 28 MMOL/L (ref 22–31)
CREAT SERPL-MCNC: 0.6 MG/DL (ref 0.7–1.3)
GFR SERPL CREATININE-BSD FRML MDRD: >60 ML/MIN/1.73M2
GLUCOSE BLD-MCNC: 90 MG/DL (ref 70–125)
POTASSIUM BLD-SCNC: 4.3 MMOL/L (ref 3.5–5)
SODIUM SERPL-SCNC: 137 MMOL/L (ref 136–145)

## 2018-12-18 ENCOUNTER — RECORDS - HEALTHEAST (OUTPATIENT)
Dept: LAB | Facility: CLINIC | Age: 63
End: 2018-12-18

## 2018-12-19 LAB
ANION GAP SERPL CALCULATED.3IONS-SCNC: 8 MMOL/L (ref 5–18)
BUN SERPL-MCNC: 9 MG/DL (ref 8–22)
CALCIUM SERPL-MCNC: 9.4 MG/DL (ref 8.5–10.5)
CHLORIDE BLD-SCNC: 100 MMOL/L (ref 98–107)
CO2 SERPL-SCNC: 26 MMOL/L (ref 22–31)
CREAT SERPL-MCNC: 0.56 MG/DL (ref 0.7–1.3)
GFR SERPL CREATININE-BSD FRML MDRD: >60 ML/MIN/1.73M2
GLUCOSE BLD-MCNC: 86 MG/DL (ref 70–125)
POTASSIUM BLD-SCNC: 3.8 MMOL/L (ref 3.5–5)
SODIUM SERPL-SCNC: 134 MMOL/L (ref 136–145)

## 2018-12-24 ENCOUNTER — RECORDS - HEALTHEAST (OUTPATIENT)
Dept: LAB | Facility: CLINIC | Age: 63
End: 2018-12-24

## 2018-12-26 LAB
ANION GAP SERPL CALCULATED.3IONS-SCNC: 10 MMOL/L (ref 5–18)
BUN SERPL-MCNC: 9 MG/DL (ref 8–22)
CALCIUM SERPL-MCNC: 9.7 MG/DL (ref 8.5–10.5)
CHLORIDE BLD-SCNC: 100 MMOL/L (ref 98–107)
CO2 SERPL-SCNC: 27 MMOL/L (ref 22–31)
CREAT SERPL-MCNC: 0.61 MG/DL (ref 0.7–1.3)
GFR SERPL CREATININE-BSD FRML MDRD: >60 ML/MIN/1.73M2
GLUCOSE BLD-MCNC: 87 MG/DL (ref 70–125)
POTASSIUM BLD-SCNC: 3.9 MMOL/L (ref 3.5–5)
SODIUM SERPL-SCNC: 137 MMOL/L (ref 136–145)

## 2018-12-31 ENCOUNTER — RECORDS - HEALTHEAST (OUTPATIENT)
Dept: LAB | Facility: CLINIC | Age: 63
End: 2018-12-31

## 2019-01-02 LAB
ANION GAP SERPL CALCULATED.3IONS-SCNC: 11 MMOL/L (ref 5–18)
BUN SERPL-MCNC: 10 MG/DL (ref 8–22)
CALCIUM SERPL-MCNC: 9.3 MG/DL (ref 8.5–10.5)
CHLORIDE BLD-SCNC: 99 MMOL/L (ref 98–107)
CO2 SERPL-SCNC: 26 MMOL/L (ref 22–31)
CREAT SERPL-MCNC: 0.6 MG/DL (ref 0.7–1.3)
GFR SERPL CREATININE-BSD FRML MDRD: >60 ML/MIN/1.73M2
GLUCOSE BLD-MCNC: 84 MG/DL (ref 70–125)
POTASSIUM BLD-SCNC: 4.1 MMOL/L (ref 3.5–5)
SODIUM SERPL-SCNC: 136 MMOL/L (ref 136–145)

## 2019-01-08 ENCOUNTER — RECORDS - HEALTHEAST (OUTPATIENT)
Dept: LAB | Facility: CLINIC | Age: 64
End: 2019-01-08

## 2019-01-09 LAB
ANION GAP SERPL CALCULATED.3IONS-SCNC: 5 MMOL/L (ref 5–18)
BUN SERPL-MCNC: 9 MG/DL (ref 8–22)
CALCIUM SERPL-MCNC: 9.2 MG/DL (ref 8.5–10.5)
CHLORIDE BLD-SCNC: 100 MMOL/L (ref 98–107)
CO2 SERPL-SCNC: 30 MMOL/L (ref 22–31)
CREAT SERPL-MCNC: 0.58 MG/DL (ref 0.7–1.3)
GFR SERPL CREATININE-BSD FRML MDRD: >60 ML/MIN/1.73M2
GLUCOSE BLD-MCNC: 84 MG/DL (ref 70–125)
POTASSIUM BLD-SCNC: 4.3 MMOL/L (ref 3.5–5)
SODIUM SERPL-SCNC: 135 MMOL/L (ref 136–145)

## 2019-01-15 ENCOUNTER — TRANSFERRED RECORDS (OUTPATIENT)
Dept: HEALTH INFORMATION MANAGEMENT | Facility: CLINIC | Age: 64
End: 2019-01-15

## 2019-01-15 ENCOUNTER — RECORDS - HEALTHEAST (OUTPATIENT)
Dept: LAB | Facility: CLINIC | Age: 64
End: 2019-01-15

## 2019-01-16 LAB
25(OH)D3 SERPL-MCNC: 65.9 NG/ML (ref 30–80)
ALT SERPL W P-5'-P-CCNC: 46 U/L (ref 0–45)
ANION GAP SERPL CALCULATED.3IONS-SCNC: 11 MMOL/L (ref 5–18)
AST SERPL W P-5'-P-CCNC: 36 U/L (ref 0–40)
BASOPHILS # BLD AUTO: 0 THOU/UL (ref 0–0.2)
BASOPHILS NFR BLD AUTO: 1 % (ref 0–2)
BUN SERPL-MCNC: 13 MG/DL (ref 8–22)
CALCIUM SERPL-MCNC: 9.3 MG/DL (ref 8.5–10.5)
CHLORIDE BLD-SCNC: 100 MMOL/L (ref 98–107)
CO2 SERPL-SCNC: 25 MMOL/L (ref 22–31)
CREAT SERPL-MCNC: 0.61 MG/DL (ref 0.7–1.3)
EOSINOPHIL # BLD AUTO: 0.2 THOU/UL (ref 0–0.4)
EOSINOPHIL NFR BLD AUTO: 5 % (ref 0–6)
ERYTHROCYTE [DISTWIDTH] IN BLOOD BY AUTOMATED COUNT: 14.2 % (ref 11–14.5)
GFR SERPL CREATININE-BSD FRML MDRD: >60 ML/MIN/1.73M2
GLUCOSE BLD-MCNC: 97 MG/DL (ref 70–125)
HCT VFR BLD AUTO: 41.2 % (ref 40–54)
HGB BLD-MCNC: 13.3 G/DL (ref 14–18)
LYMPHOCYTES # BLD AUTO: 1.1 THOU/UL (ref 0.8–4.4)
LYMPHOCYTES NFR BLD AUTO: 22 % (ref 20–40)
MCH RBC QN AUTO: 29.7 PG (ref 27–34)
MCHC RBC AUTO-ENTMCNC: 32.3 G/DL (ref 32–36)
MCV RBC AUTO: 92 FL (ref 80–100)
MONOCYTES # BLD AUTO: 0.6 THOU/UL (ref 0–0.9)
MONOCYTES NFR BLD AUTO: 12 % (ref 2–10)
NEUTROPHILS # BLD AUTO: 3 THOU/UL (ref 2–7.7)
NEUTROPHILS NFR BLD AUTO: 61 % (ref 50–70)
PLATELET # BLD AUTO: 250 THOU/UL (ref 140–440)
PMV BLD AUTO: 9 FL (ref 8.5–12.5)
POTASSIUM BLD-SCNC: 3.8 MMOL/L (ref 3.5–5)
RBC # BLD AUTO: 4.48 MILL/UL (ref 4.4–6.2)
SODIUM SERPL-SCNC: 136 MMOL/L (ref 136–145)
WBC: 4.9 THOU/UL (ref 4–11)

## 2019-01-22 ENCOUNTER — RECORDS - HEALTHEAST (OUTPATIENT)
Dept: LAB | Facility: CLINIC | Age: 64
End: 2019-01-22

## 2019-01-23 LAB
ANION GAP SERPL CALCULATED.3IONS-SCNC: 12 MMOL/L (ref 5–18)
BUN SERPL-MCNC: 9 MG/DL (ref 8–22)
CALCIUM SERPL-MCNC: 9.5 MG/DL (ref 8.5–10.5)
CHLORIDE BLD-SCNC: 101 MMOL/L (ref 98–107)
CO2 SERPL-SCNC: 24 MMOL/L (ref 22–31)
CREAT SERPL-MCNC: 0.57 MG/DL (ref 0.7–1.3)
GFR SERPL CREATININE-BSD FRML MDRD: >60 ML/MIN/1.73M2
GLUCOSE BLD-MCNC: 81 MG/DL (ref 70–125)
POTASSIUM BLD-SCNC: 3.8 MMOL/L (ref 3.5–5)
SODIUM SERPL-SCNC: 137 MMOL/L (ref 136–145)

## 2019-01-29 ENCOUNTER — RECORDS - HEALTHEAST (OUTPATIENT)
Dept: LAB | Facility: CLINIC | Age: 64
End: 2019-01-29

## 2019-01-31 LAB
ANION GAP SERPL CALCULATED.3IONS-SCNC: 8 MMOL/L (ref 5–18)
BUN SERPL-MCNC: 7 MG/DL (ref 8–22)
CALCIUM SERPL-MCNC: 9.7 MG/DL (ref 8.5–10.5)
CHLORIDE BLD-SCNC: 103 MMOL/L (ref 98–107)
CO2 SERPL-SCNC: 28 MMOL/L (ref 22–31)
CREAT SERPL-MCNC: 0.68 MG/DL (ref 0.7–1.3)
GFR SERPL CREATININE-BSD FRML MDRD: >60 ML/MIN/1.73M2
GLUCOSE BLD-MCNC: 100 MG/DL (ref 70–125)
POTASSIUM BLD-SCNC: 4.3 MMOL/L (ref 3.5–5)
SODIUM SERPL-SCNC: 139 MMOL/L (ref 136–145)

## 2019-02-06 ENCOUNTER — RECORDS - HEALTHEAST (OUTPATIENT)
Dept: LAB | Facility: CLINIC | Age: 64
End: 2019-02-06

## 2019-02-06 LAB
ANION GAP SERPL CALCULATED.3IONS-SCNC: 6 MMOL/L (ref 5–18)
BUN SERPL-MCNC: 8 MG/DL (ref 8–22)
CALCIUM SERPL-MCNC: 9.4 MG/DL (ref 8.5–10.5)
CHLORIDE BLD-SCNC: 104 MMOL/L (ref 98–107)
CO2 SERPL-SCNC: 28 MMOL/L (ref 22–31)
CREAT SERPL-MCNC: 0.61 MG/DL (ref 0.7–1.3)
GFR SERPL CREATININE-BSD FRML MDRD: >60 ML/MIN/1.73M2
GLUCOSE BLD-MCNC: 92 MG/DL (ref 70–125)
POTASSIUM BLD-SCNC: 4 MMOL/L (ref 3.5–5)
SODIUM SERPL-SCNC: 138 MMOL/L (ref 136–145)

## 2019-02-13 ENCOUNTER — RECORDS - HEALTHEAST (OUTPATIENT)
Dept: LAB | Facility: CLINIC | Age: 64
End: 2019-02-13

## 2019-02-13 LAB
ANION GAP SERPL CALCULATED.3IONS-SCNC: 8 MMOL/L (ref 5–18)
BUN SERPL-MCNC: 11 MG/DL (ref 8–22)
CALCIUM SERPL-MCNC: 9.7 MG/DL (ref 8.5–10.5)
CHLORIDE BLD-SCNC: 103 MMOL/L (ref 98–107)
CO2 SERPL-SCNC: 29 MMOL/L (ref 22–31)
CREAT SERPL-MCNC: 0.61 MG/DL (ref 0.7–1.3)
ERYTHROCYTE [DISTWIDTH] IN BLOOD BY AUTOMATED COUNT: 14.3 % (ref 11–14.5)
GFR SERPL CREATININE-BSD FRML MDRD: >60 ML/MIN/1.73M2
GLUCOSE BLD-MCNC: 82 MG/DL (ref 70–125)
HCT VFR BLD AUTO: 41 % (ref 40–54)
HGB BLD-MCNC: 13 G/DL (ref 14–18)
MCH RBC QN AUTO: 29.5 PG (ref 27–34)
MCHC RBC AUTO-ENTMCNC: 31.7 G/DL (ref 32–36)
MCV RBC AUTO: 93 FL (ref 80–100)
PLATELET # BLD AUTO: 254 THOU/UL (ref 140–440)
PMV BLD AUTO: 9.6 FL (ref 8.5–12.5)
POTASSIUM BLD-SCNC: 4.2 MMOL/L (ref 3.5–5)
RBC # BLD AUTO: 4.4 MILL/UL (ref 4.4–6.2)
SODIUM SERPL-SCNC: 140 MMOL/L (ref 136–145)
WBC: 4.1 THOU/UL (ref 4–11)

## 2019-02-20 ENCOUNTER — RECORDS - HEALTHEAST (OUTPATIENT)
Dept: LAB | Facility: CLINIC | Age: 64
End: 2019-02-20

## 2019-02-20 LAB
ANION GAP SERPL CALCULATED.3IONS-SCNC: 7 MMOL/L (ref 5–18)
BUN SERPL-MCNC: 10 MG/DL (ref 8–22)
CALCIUM SERPL-MCNC: 9.4 MG/DL (ref 8.5–10.5)
CHLORIDE BLD-SCNC: 105 MMOL/L (ref 98–107)
CO2 SERPL-SCNC: 27 MMOL/L (ref 22–31)
CREAT SERPL-MCNC: 0.6 MG/DL (ref 0.7–1.3)
GFR SERPL CREATININE-BSD FRML MDRD: >60 ML/MIN/1.73M2
GLUCOSE BLD-MCNC: 93 MG/DL (ref 70–125)
POTASSIUM BLD-SCNC: 3.9 MMOL/L (ref 3.5–5)
SODIUM SERPL-SCNC: 139 MMOL/L (ref 136–145)

## 2019-02-26 ENCOUNTER — RECORDS - HEALTHEAST (OUTPATIENT)
Dept: LAB | Facility: CLINIC | Age: 64
End: 2019-02-26

## 2019-02-27 LAB
ANION GAP SERPL CALCULATED.3IONS-SCNC: 8 MMOL/L (ref 5–18)
BUN SERPL-MCNC: 14 MG/DL (ref 8–22)
CALCIUM SERPL-MCNC: 9.4 MG/DL (ref 8.5–10.5)
CHLORIDE BLD-SCNC: 104 MMOL/L (ref 98–107)
CO2 SERPL-SCNC: 28 MMOL/L (ref 22–31)
CREAT SERPL-MCNC: 0.69 MG/DL (ref 0.7–1.3)
GFR SERPL CREATININE-BSD FRML MDRD: >60 ML/MIN/1.73M2
GLUCOSE BLD-MCNC: 92 MG/DL (ref 70–125)
POTASSIUM BLD-SCNC: 4 MMOL/L (ref 3.5–5)
SODIUM SERPL-SCNC: 140 MMOL/L (ref 136–145)

## 2019-03-05 ENCOUNTER — RECORDS - HEALTHEAST (OUTPATIENT)
Dept: LAB | Facility: CLINIC | Age: 64
End: 2019-03-05

## 2019-03-06 LAB
ANION GAP SERPL CALCULATED.3IONS-SCNC: 10 MMOL/L (ref 5–18)
BUN SERPL-MCNC: 13 MG/DL (ref 8–22)
CALCIUM SERPL-MCNC: 9.6 MG/DL (ref 8.5–10.5)
CHLORIDE BLD-SCNC: 105 MMOL/L (ref 98–107)
CO2 SERPL-SCNC: 24 MMOL/L (ref 22–31)
CREAT SERPL-MCNC: 0.6 MG/DL (ref 0.7–1.3)
GFR SERPL CREATININE-BSD FRML MDRD: >60 ML/MIN/1.73M2
GLUCOSE BLD-MCNC: 98 MG/DL (ref 70–125)
POTASSIUM BLD-SCNC: 3.7 MMOL/L (ref 3.5–5)
SODIUM SERPL-SCNC: 139 MMOL/L (ref 136–145)

## 2019-03-13 ENCOUNTER — RECORDS - HEALTHEAST (OUTPATIENT)
Dept: LAB | Facility: CLINIC | Age: 64
End: 2019-03-13

## 2019-03-13 LAB
ANION GAP SERPL CALCULATED.3IONS-SCNC: 11 MMOL/L (ref 5–18)
BUN SERPL-MCNC: 12 MG/DL (ref 8–22)
CALCIUM SERPL-MCNC: 9.2 MG/DL (ref 8.5–10.5)
CHLORIDE BLD-SCNC: 105 MMOL/L (ref 98–107)
CO2 SERPL-SCNC: 25 MMOL/L (ref 22–31)
CREAT SERPL-MCNC: 0.65 MG/DL (ref 0.7–1.3)
GFR SERPL CREATININE-BSD FRML MDRD: >60 ML/MIN/1.73M2
GLUCOSE BLD-MCNC: 76 MG/DL (ref 70–125)
POTASSIUM BLD-SCNC: 4 MMOL/L (ref 3.5–5)
SODIUM SERPL-SCNC: 141 MMOL/L (ref 136–145)

## 2019-03-20 ENCOUNTER — RECORDS - HEALTHEAST (OUTPATIENT)
Dept: LAB | Facility: CLINIC | Age: 64
End: 2019-03-20

## 2019-03-20 LAB
ANION GAP SERPL CALCULATED.3IONS-SCNC: 8 MMOL/L (ref 5–18)
BUN SERPL-MCNC: 11 MG/DL (ref 8–22)
CALCIUM SERPL-MCNC: 9.6 MG/DL (ref 8.5–10.5)
CHLORIDE BLD-SCNC: 103 MMOL/L (ref 98–107)
CO2 SERPL-SCNC: 27 MMOL/L (ref 22–31)
CREAT SERPL-MCNC: 0.63 MG/DL (ref 0.7–1.3)
GFR SERPL CREATININE-BSD FRML MDRD: >60 ML/MIN/1.73M2
GLUCOSE BLD-MCNC: 82 MG/DL (ref 70–125)
POTASSIUM BLD-SCNC: 3.9 MMOL/L (ref 3.5–5)
SODIUM SERPL-SCNC: 138 MMOL/L (ref 136–145)

## 2019-03-26 ENCOUNTER — RECORDS - HEALTHEAST (OUTPATIENT)
Dept: LAB | Facility: CLINIC | Age: 64
End: 2019-03-26

## 2019-03-27 LAB
ANION GAP SERPL CALCULATED.3IONS-SCNC: 6 MMOL/L (ref 5–18)
BUN SERPL-MCNC: 13 MG/DL (ref 8–22)
CALCIUM SERPL-MCNC: 9.7 MG/DL (ref 8.5–10.5)
CHLORIDE BLD-SCNC: 103 MMOL/L (ref 98–107)
CO2 SERPL-SCNC: 30 MMOL/L (ref 22–31)
CREAT SERPL-MCNC: 0.63 MG/DL (ref 0.7–1.3)
GFR SERPL CREATININE-BSD FRML MDRD: >60 ML/MIN/1.73M2
GLUCOSE BLD-MCNC: 89 MG/DL (ref 70–125)
POTASSIUM BLD-SCNC: 4.2 MMOL/L (ref 3.5–5)
SODIUM SERPL-SCNC: 139 MMOL/L (ref 136–145)

## 2019-04-03 ENCOUNTER — RECORDS - HEALTHEAST (OUTPATIENT)
Dept: LAB | Facility: CLINIC | Age: 64
End: 2019-04-03

## 2019-04-03 LAB
ANION GAP SERPL CALCULATED.3IONS-SCNC: 9 MMOL/L (ref 5–18)
BUN SERPL-MCNC: 11 MG/DL (ref 8–22)
CALCIUM SERPL-MCNC: 9.9 MG/DL (ref 8.5–10.5)
CHLORIDE BLD-SCNC: 105 MMOL/L (ref 98–107)
CO2 SERPL-SCNC: 25 MMOL/L (ref 22–31)
CREAT SERPL-MCNC: 0.59 MG/DL (ref 0.7–1.3)
GFR SERPL CREATININE-BSD FRML MDRD: >60 ML/MIN/1.73M2
GLUCOSE BLD-MCNC: 84 MG/DL (ref 70–125)
POTASSIUM BLD-SCNC: 4 MMOL/L (ref 3.5–5)
SODIUM SERPL-SCNC: 139 MMOL/L (ref 136–145)

## 2019-04-09 ENCOUNTER — RECORDS - HEALTHEAST (OUTPATIENT)
Dept: LAB | Facility: CLINIC | Age: 64
End: 2019-04-09

## 2019-04-10 LAB
ANION GAP SERPL CALCULATED.3IONS-SCNC: 7 MMOL/L (ref 5–18)
BUN SERPL-MCNC: 12 MG/DL (ref 8–22)
CALCIUM SERPL-MCNC: 9.4 MG/DL (ref 8.5–10.5)
CHLORIDE BLD-SCNC: 104 MMOL/L (ref 98–107)
CO2 SERPL-SCNC: 28 MMOL/L (ref 22–31)
CREAT SERPL-MCNC: 0.62 MG/DL (ref 0.7–1.3)
GFR SERPL CREATININE-BSD FRML MDRD: >60 ML/MIN/1.73M2
GLUCOSE BLD-MCNC: 87 MG/DL (ref 70–125)
POTASSIUM BLD-SCNC: 3.9 MMOL/L (ref 3.5–5)
SODIUM SERPL-SCNC: 139 MMOL/L (ref 136–145)

## 2019-04-17 ENCOUNTER — RECORDS - HEALTHEAST (OUTPATIENT)
Dept: LAB | Facility: CLINIC | Age: 64
End: 2019-04-17

## 2019-04-17 LAB
ANION GAP SERPL CALCULATED.3IONS-SCNC: 6 MMOL/L (ref 5–18)
BUN SERPL-MCNC: 14 MG/DL (ref 8–22)
CALCIUM SERPL-MCNC: 9.7 MG/DL (ref 8.5–10.5)
CHLORIDE BLD-SCNC: 105 MMOL/L (ref 98–107)
CO2 SERPL-SCNC: 28 MMOL/L (ref 22–31)
CREAT SERPL-MCNC: 0.6 MG/DL (ref 0.7–1.3)
GFR SERPL CREATININE-BSD FRML MDRD: >60 ML/MIN/1.73M2
GLUCOSE BLD-MCNC: 81 MG/DL (ref 70–125)
POTASSIUM BLD-SCNC: 4.1 MMOL/L (ref 3.5–5)
SODIUM SERPL-SCNC: 139 MMOL/L (ref 136–145)

## 2019-04-18 LAB — 25(OH)D3 SERPL-MCNC: 64.2 NG/ML (ref 30–80)

## 2019-04-24 ENCOUNTER — RECORDS - HEALTHEAST (OUTPATIENT)
Dept: LAB | Facility: CLINIC | Age: 64
End: 2019-04-24

## 2019-04-24 LAB
ANION GAP SERPL CALCULATED.3IONS-SCNC: 8 MMOL/L (ref 5–18)
BUN SERPL-MCNC: 14 MG/DL (ref 8–22)
CALCIUM SERPL-MCNC: 10.1 MG/DL (ref 8.5–10.5)
CHLORIDE BLD-SCNC: 104 MMOL/L (ref 98–107)
CO2 SERPL-SCNC: 28 MMOL/L (ref 22–31)
CREAT SERPL-MCNC: 0.66 MG/DL (ref 0.7–1.3)
GFR SERPL CREATININE-BSD FRML MDRD: >60 ML/MIN/1.73M2
GLUCOSE BLD-MCNC: 85 MG/DL (ref 70–125)
POTASSIUM BLD-SCNC: 4.7 MMOL/L (ref 3.5–5)
SODIUM SERPL-SCNC: 140 MMOL/L (ref 136–145)

## 2019-04-30 ENCOUNTER — RECORDS - HEALTHEAST (OUTPATIENT)
Dept: LAB | Facility: CLINIC | Age: 64
End: 2019-04-30

## 2019-05-01 LAB
ANION GAP SERPL CALCULATED.3IONS-SCNC: 8 MMOL/L (ref 5–18)
BUN SERPL-MCNC: 11 MG/DL (ref 8–22)
CALCIUM SERPL-MCNC: 9.6 MG/DL (ref 8.5–10.5)
CHLORIDE BLD-SCNC: 105 MMOL/L (ref 98–107)
CO2 SERPL-SCNC: 25 MMOL/L (ref 22–31)
CREAT SERPL-MCNC: 0.61 MG/DL (ref 0.7–1.3)
GFR SERPL CREATININE-BSD FRML MDRD: >60 ML/MIN/1.73M2
GLUCOSE BLD-MCNC: 84 MG/DL (ref 70–125)
POTASSIUM BLD-SCNC: 4.2 MMOL/L (ref 3.5–5)
SODIUM SERPL-SCNC: 138 MMOL/L (ref 136–145)

## 2019-05-07 ENCOUNTER — RECORDS - HEALTHEAST (OUTPATIENT)
Dept: LAB | Facility: CLINIC | Age: 64
End: 2019-05-07

## 2019-05-08 LAB
ANION GAP SERPL CALCULATED.3IONS-SCNC: 12 MMOL/L (ref 5–18)
BUN SERPL-MCNC: 10 MG/DL (ref 8–22)
CALCIUM SERPL-MCNC: 10 MG/DL (ref 8.5–10.5)
CHLORIDE BLD-SCNC: 104 MMOL/L (ref 98–107)
CO2 SERPL-SCNC: 24 MMOL/L (ref 22–31)
CREAT SERPL-MCNC: 0.61 MG/DL (ref 0.7–1.3)
GFR SERPL CREATININE-BSD FRML MDRD: >60 ML/MIN/1.73M2
GLUCOSE BLD-MCNC: 95 MG/DL (ref 70–125)
POTASSIUM BLD-SCNC: 3.8 MMOL/L (ref 3.5–5)
SODIUM SERPL-SCNC: 140 MMOL/L (ref 136–145)

## 2019-05-14 ENCOUNTER — RECORDS - HEALTHEAST (OUTPATIENT)
Dept: LAB | Facility: CLINIC | Age: 64
End: 2019-05-14

## 2019-05-15 LAB
ANION GAP SERPL CALCULATED.3IONS-SCNC: 6 MMOL/L (ref 5–18)
BUN SERPL-MCNC: 11 MG/DL (ref 8–22)
CALCIUM SERPL-MCNC: 9.6 MG/DL (ref 8.5–10.5)
CHLORIDE BLD-SCNC: 106 MMOL/L (ref 98–107)
CO2 SERPL-SCNC: 28 MMOL/L (ref 22–31)
CREAT SERPL-MCNC: 0.56 MG/DL (ref 0.7–1.3)
GFR SERPL CREATININE-BSD FRML MDRD: >60 ML/MIN/1.73M2
GLUCOSE BLD-MCNC: 84 MG/DL (ref 70–125)
POTASSIUM BLD-SCNC: 3.8 MMOL/L (ref 3.5–5)
SODIUM SERPL-SCNC: 140 MMOL/L (ref 136–145)

## 2019-05-21 ENCOUNTER — RECORDS - HEALTHEAST (OUTPATIENT)
Dept: LAB | Facility: CLINIC | Age: 64
End: 2019-05-21

## 2019-05-22 LAB
ANION GAP SERPL CALCULATED.3IONS-SCNC: 8 MMOL/L (ref 5–18)
BUN SERPL-MCNC: 16 MG/DL (ref 8–22)
CALCIUM SERPL-MCNC: 9.2 MG/DL (ref 8.5–10.5)
CHLORIDE BLD-SCNC: 106 MMOL/L (ref 98–107)
CO2 SERPL-SCNC: 25 MMOL/L (ref 22–31)
CREAT SERPL-MCNC: 0.57 MG/DL (ref 0.7–1.3)
GFR SERPL CREATININE-BSD FRML MDRD: >60 ML/MIN/1.73M2
GLUCOSE BLD-MCNC: 78 MG/DL (ref 70–125)
POTASSIUM BLD-SCNC: 3.9 MMOL/L (ref 3.5–5)
SODIUM SERPL-SCNC: 139 MMOL/L (ref 136–145)

## 2019-05-29 ENCOUNTER — RECORDS - HEALTHEAST (OUTPATIENT)
Dept: LAB | Facility: CLINIC | Age: 64
End: 2019-05-29

## 2019-05-29 LAB
ANION GAP SERPL CALCULATED.3IONS-SCNC: 9 MMOL/L (ref 5–18)
BUN SERPL-MCNC: 14 MG/DL (ref 8–22)
CALCIUM SERPL-MCNC: 9.9 MG/DL (ref 8.5–10.5)
CHLORIDE BLD-SCNC: 103 MMOL/L (ref 98–107)
CO2 SERPL-SCNC: 28 MMOL/L (ref 22–31)
CREAT SERPL-MCNC: 0.65 MG/DL (ref 0.7–1.3)
GFR SERPL CREATININE-BSD FRML MDRD: >60 ML/MIN/1.73M2
GLUCOSE BLD-MCNC: 75 MG/DL (ref 70–125)
POTASSIUM BLD-SCNC: 3.9 MMOL/L (ref 3.5–5)
SODIUM SERPL-SCNC: 140 MMOL/L (ref 136–145)

## 2019-06-04 ENCOUNTER — RECORDS - HEALTHEAST (OUTPATIENT)
Dept: LAB | Facility: CLINIC | Age: 64
End: 2019-06-04

## 2019-06-05 LAB
ANION GAP SERPL CALCULATED.3IONS-SCNC: 8 MMOL/L (ref 5–18)
BUN SERPL-MCNC: 13 MG/DL (ref 8–22)
CALCIUM SERPL-MCNC: 9.8 MG/DL (ref 8.5–10.5)
CHLORIDE BLD-SCNC: 104 MMOL/L (ref 98–107)
CO2 SERPL-SCNC: 27 MMOL/L (ref 22–31)
CREAT SERPL-MCNC: 0.65 MG/DL (ref 0.7–1.3)
GFR SERPL CREATININE-BSD FRML MDRD: >60 ML/MIN/1.73M2
GLUCOSE BLD-MCNC: 86 MG/DL (ref 70–125)
POTASSIUM BLD-SCNC: 4.1 MMOL/L (ref 3.5–5)
SODIUM SERPL-SCNC: 139 MMOL/L (ref 136–145)

## 2019-06-11 ENCOUNTER — RECORDS - HEALTHEAST (OUTPATIENT)
Dept: LAB | Facility: CLINIC | Age: 64
End: 2019-06-11

## 2019-06-12 LAB
ANION GAP SERPL CALCULATED.3IONS-SCNC: 7 MMOL/L (ref 5–18)
BUN SERPL-MCNC: 13 MG/DL (ref 8–22)
CALCIUM SERPL-MCNC: 9.8 MG/DL (ref 8.5–10.5)
CHLORIDE BLD-SCNC: 103 MMOL/L (ref 98–107)
CO2 SERPL-SCNC: 29 MMOL/L (ref 22–31)
CREAT SERPL-MCNC: 0.64 MG/DL (ref 0.7–1.3)
GFR SERPL CREATININE-BSD FRML MDRD: >60 ML/MIN/1.73M2
GLUCOSE BLD-MCNC: 105 MG/DL (ref 70–125)
POTASSIUM BLD-SCNC: 3.8 MMOL/L (ref 3.5–5)
SODIUM SERPL-SCNC: 139 MMOL/L (ref 136–145)

## 2019-06-19 ENCOUNTER — RECORDS - HEALTHEAST (OUTPATIENT)
Dept: LAB | Facility: CLINIC | Age: 64
End: 2019-06-19

## 2019-06-19 LAB
ANION GAP SERPL CALCULATED.3IONS-SCNC: 5 MMOL/L (ref 5–18)
BUN SERPL-MCNC: 14 MG/DL (ref 8–22)
CALCIUM SERPL-MCNC: 9.5 MG/DL (ref 8.5–10.5)
CHLORIDE BLD-SCNC: 106 MMOL/L (ref 98–107)
CO2 SERPL-SCNC: 29 MMOL/L (ref 22–31)
CREAT SERPL-MCNC: 0.65 MG/DL (ref 0.7–1.3)
GFR SERPL CREATININE-BSD FRML MDRD: >60 ML/MIN/1.73M2
GLUCOSE BLD-MCNC: 90 MG/DL (ref 70–125)
POTASSIUM BLD-SCNC: 4.4 MMOL/L (ref 3.5–5)
SODIUM SERPL-SCNC: 140 MMOL/L (ref 136–145)

## 2019-06-25 ENCOUNTER — RECORDS - HEALTHEAST (OUTPATIENT)
Dept: LAB | Facility: CLINIC | Age: 64
End: 2019-06-25

## 2019-06-26 LAB
ANION GAP SERPL CALCULATED.3IONS-SCNC: 9 MMOL/L (ref 5–18)
BUN SERPL-MCNC: 13 MG/DL (ref 8–22)
CALCIUM SERPL-MCNC: 9.7 MG/DL (ref 8.5–10.5)
CHLORIDE BLD-SCNC: 104 MMOL/L (ref 98–107)
CO2 SERPL-SCNC: 26 MMOL/L (ref 22–31)
CREAT SERPL-MCNC: 0.66 MG/DL (ref 0.7–1.3)
GFR SERPL CREATININE-BSD FRML MDRD: >60 ML/MIN/1.73M2
GLUCOSE BLD-MCNC: 82 MG/DL (ref 70–125)
POTASSIUM BLD-SCNC: 4.2 MMOL/L (ref 3.5–5)
SODIUM SERPL-SCNC: 139 MMOL/L (ref 136–145)

## 2019-07-01 ENCOUNTER — RECORDS - HEALTHEAST (OUTPATIENT)
Dept: LAB | Facility: CLINIC | Age: 64
End: 2019-07-01

## 2019-07-01 LAB
CHOLEST SERPL-MCNC: 114 MG/DL
FASTING STATUS PATIENT QL REPORTED: NORMAL
HDLC SERPL-MCNC: 44 MG/DL
HGB BLD-MCNC: 13 G/DL (ref 14–18)
LDLC SERPL CALC-MCNC: 57 MG/DL
TRIGL SERPL-MCNC: 64 MG/DL
TSH SERPL DL<=0.005 MIU/L-ACNC: 1.65 UIU/ML (ref 0.3–5)

## 2019-07-02 ENCOUNTER — RECORDS - HEALTHEAST (OUTPATIENT)
Dept: LAB | Facility: CLINIC | Age: 64
End: 2019-07-02

## 2019-07-03 LAB
ANION GAP SERPL CALCULATED.3IONS-SCNC: 7 MMOL/L (ref 5–18)
BUN SERPL-MCNC: 15 MG/DL (ref 8–22)
CALCIUM SERPL-MCNC: 9.6 MG/DL (ref 8.5–10.5)
CHLORIDE BLD-SCNC: 104 MMOL/L (ref 98–107)
CO2 SERPL-SCNC: 28 MMOL/L (ref 22–31)
CREAT SERPL-MCNC: 0.64 MG/DL (ref 0.7–1.3)
GFR SERPL CREATININE-BSD FRML MDRD: >60 ML/MIN/1.73M2
GLUCOSE BLD-MCNC: 74 MG/DL (ref 70–125)
POTASSIUM BLD-SCNC: 4.4 MMOL/L (ref 3.5–5)
SODIUM SERPL-SCNC: 139 MMOL/L (ref 136–145)

## 2019-07-09 ENCOUNTER — RECORDS - HEALTHEAST (OUTPATIENT)
Dept: LAB | Facility: CLINIC | Age: 64
End: 2019-07-09

## 2019-07-10 LAB
ANION GAP SERPL CALCULATED.3IONS-SCNC: 8 MMOL/L (ref 5–18)
BUN SERPL-MCNC: 12 MG/DL (ref 8–22)
CALCIUM SERPL-MCNC: 10 MG/DL (ref 8.5–10.5)
CHLORIDE BLD-SCNC: 102 MMOL/L (ref 98–107)
CO2 SERPL-SCNC: 28 MMOL/L (ref 22–31)
CREAT SERPL-MCNC: 0.64 MG/DL (ref 0.7–1.3)
GFR SERPL CREATININE-BSD FRML MDRD: >60 ML/MIN/1.73M2
GLUCOSE BLD-MCNC: 82 MG/DL (ref 70–125)
POTASSIUM BLD-SCNC: 3.7 MMOL/L (ref 3.5–5)
SODIUM SERPL-SCNC: 138 MMOL/L (ref 136–145)

## 2019-07-16 ENCOUNTER — RECORDS - HEALTHEAST (OUTPATIENT)
Dept: LAB | Facility: CLINIC | Age: 64
End: 2019-07-16

## 2019-07-17 LAB
25(OH)D3 SERPL-MCNC: 79.6 NG/ML (ref 30–80)
ALT SERPL W P-5'-P-CCNC: 40 U/L (ref 0–45)
ANION GAP SERPL CALCULATED.3IONS-SCNC: 5 MMOL/L (ref 5–18)
AST SERPL W P-5'-P-CCNC: 30 U/L (ref 0–40)
BASOPHILS # BLD AUTO: 0.1 THOU/UL (ref 0–0.2)
BASOPHILS NFR BLD AUTO: 1 % (ref 0–2)
BUN SERPL-MCNC: 13 MG/DL (ref 8–22)
CALCIUM SERPL-MCNC: 9.6 MG/DL (ref 8.5–10.5)
CALCIUM SERPL-MCNC: 9.6 MG/DL (ref 8.5–10.5)
CHLORIDE BLD-SCNC: 107 MMOL/L (ref 98–107)
CO2 SERPL-SCNC: 28 MMOL/L (ref 22–31)
CREAT SERPL-MCNC: 0.59 MG/DL (ref 0.7–1.3)
CREAT SERPL-MCNC: 0.59 MG/DL (ref 0.7–1.3)
EOSINOPHIL # BLD AUTO: 0.3 THOU/UL (ref 0–0.4)
EOSINOPHIL NFR BLD AUTO: 5 % (ref 0–6)
ERYTHROCYTE [DISTWIDTH] IN BLOOD BY AUTOMATED COUNT: 14.9 % (ref 11–14.5)
GFR SERPL CREATININE-BSD FRML MDRD: >60 ML/MIN/1.73M2
GFR SERPL CREATININE-BSD FRML MDRD: >60 ML/MIN/1.73M2
GLUCOSE BLD-MCNC: 89 MG/DL (ref 70–125)
HCT VFR BLD AUTO: 40 % (ref 40–54)
HGB BLD-MCNC: 12.8 G/DL (ref 14–18)
LYMPHOCYTES # BLD AUTO: 1.1 THOU/UL (ref 0.8–4.4)
LYMPHOCYTES NFR BLD AUTO: 20 % (ref 20–40)
MCH RBC QN AUTO: 29.6 PG (ref 27–34)
MCHC RBC AUTO-ENTMCNC: 32 G/DL (ref 32–36)
MCV RBC AUTO: 93 FL (ref 80–100)
MONOCYTES # BLD AUTO: 0.5 THOU/UL (ref 0–0.9)
MONOCYTES NFR BLD AUTO: 9 % (ref 2–10)
NEUTROPHILS # BLD AUTO: 3.5 THOU/UL (ref 2–7.7)
NEUTROPHILS NFR BLD AUTO: 66 % (ref 50–70)
PLATELET # BLD AUTO: 236 THOU/UL (ref 140–440)
PMV BLD AUTO: 9.5 FL (ref 8.5–12.5)
POTASSIUM BLD-SCNC: 4.2 MMOL/L (ref 3.5–5)
RBC # BLD AUTO: 4.32 MILL/UL (ref 4.4–6.2)
SODIUM SERPL-SCNC: 140 MMOL/L (ref 136–145)
WBC: 5.4 THOU/UL (ref 4–11)

## 2019-07-24 ENCOUNTER — RECORDS - HEALTHEAST (OUTPATIENT)
Dept: LAB | Facility: CLINIC | Age: 64
End: 2019-07-24

## 2019-07-24 LAB
ANION GAP SERPL CALCULATED.3IONS-SCNC: 6 MMOL/L (ref 5–18)
BUN SERPL-MCNC: 16 MG/DL (ref 8–22)
CALCIUM SERPL-MCNC: 9.8 MG/DL (ref 8.5–10.5)
CHLORIDE BLD-SCNC: 105 MMOL/L (ref 98–107)
CO2 SERPL-SCNC: 28 MMOL/L (ref 22–31)
CREAT SERPL-MCNC: 0.64 MG/DL (ref 0.7–1.3)
GFR SERPL CREATININE-BSD FRML MDRD: >60 ML/MIN/1.73M2
GLUCOSE BLD-MCNC: 89 MG/DL (ref 70–125)
POTASSIUM BLD-SCNC: 3.8 MMOL/L (ref 3.5–5)
SODIUM SERPL-SCNC: 139 MMOL/L (ref 136–145)

## 2019-07-31 ENCOUNTER — RECORDS - HEALTHEAST (OUTPATIENT)
Dept: LAB | Facility: CLINIC | Age: 64
End: 2019-07-31

## 2019-07-31 LAB
ANION GAP SERPL CALCULATED.3IONS-SCNC: 7 MMOL/L (ref 5–18)
BUN SERPL-MCNC: 14 MG/DL (ref 8–22)
CALCIUM SERPL-MCNC: 9.6 MG/DL (ref 8.5–10.5)
CHLORIDE BLD-SCNC: 104 MMOL/L (ref 98–107)
CO2 SERPL-SCNC: 29 MMOL/L (ref 22–31)
CREAT SERPL-MCNC: 0.67 MG/DL (ref 0.7–1.3)
GFR SERPL CREATININE-BSD FRML MDRD: >60 ML/MIN/1.73M2
GLUCOSE BLD-MCNC: 81 MG/DL (ref 70–125)
POTASSIUM BLD-SCNC: 4.3 MMOL/L (ref 3.5–5)
SODIUM SERPL-SCNC: 140 MMOL/L (ref 136–145)

## 2019-08-04 ENCOUNTER — TRANSFERRED RECORDS (OUTPATIENT)
Dept: HEALTH INFORMATION MANAGEMENT | Facility: CLINIC | Age: 64
End: 2019-08-04

## 2019-08-05 ENCOUNTER — RECORDS - HEALTHEAST (OUTPATIENT)
Dept: LAB | Facility: CLINIC | Age: 64
End: 2019-08-05

## 2019-08-05 ENCOUNTER — TRANSFERRED RECORDS (OUTPATIENT)
Dept: HEALTH INFORMATION MANAGEMENT | Facility: CLINIC | Age: 64
End: 2019-08-05

## 2019-08-05 ENCOUNTER — APPOINTMENT (OUTPATIENT)
Dept: GENERAL RADIOLOGY | Facility: CLINIC | Age: 64
DRG: 698 | End: 2019-08-05
Attending: PHYSICIAN ASSISTANT
Payer: MEDICARE

## 2019-08-05 ENCOUNTER — APPOINTMENT (OUTPATIENT)
Dept: CT IMAGING | Facility: CLINIC | Age: 64
DRG: 698 | End: 2019-08-05
Attending: PHYSICIAN ASSISTANT
Payer: MEDICARE

## 2019-08-05 ENCOUNTER — HOSPITAL ENCOUNTER (INPATIENT)
Facility: CLINIC | Age: 64
LOS: 7 days | Discharge: NURSING, REHAB OR LONG-TERM CARE | DRG: 698 | End: 2019-08-13
Attending: EMERGENCY MEDICINE | Admitting: STUDENT IN AN ORGANIZED HEALTH CARE EDUCATION/TRAINING PROGRAM
Payer: MEDICARE

## 2019-08-05 DIAGNOSIS — R41.82 ALTERED MENTAL STATUS, UNSPECIFIED ALTERED MENTAL STATUS TYPE: ICD-10-CM

## 2019-08-05 DIAGNOSIS — T83.511A CATHETER-ASSOCIATED URINARY TRACT INFECTION (H): ICD-10-CM

## 2019-08-05 DIAGNOSIS — T83.511A URINARY TRACT INFECTION ASSOCIATED WITH INDWELLING URETHRAL CATHETER, INITIAL ENCOUNTER (H): ICD-10-CM

## 2019-08-05 DIAGNOSIS — I10 HYPERTENSION, UNSPECIFIED TYPE: ICD-10-CM

## 2019-08-05 DIAGNOSIS — R09.02 HYPOXIA: ICD-10-CM

## 2019-08-05 DIAGNOSIS — R52 PAIN: Primary | ICD-10-CM

## 2019-08-05 DIAGNOSIS — G89.4 CHRONIC PAIN SYNDROME: ICD-10-CM

## 2019-08-05 DIAGNOSIS — A41.9 SEPSIS, DUE TO UNSPECIFIED ORGANISM: ICD-10-CM

## 2019-08-05 DIAGNOSIS — N39.0 CATHETER-ASSOCIATED URINARY TRACT INFECTION (H): ICD-10-CM

## 2019-08-05 DIAGNOSIS — N39.0 URINARY TRACT INFECTION ASSOCIATED WITH INDWELLING URETHRAL CATHETER, INITIAL ENCOUNTER (H): ICD-10-CM

## 2019-08-05 PROBLEM — T69.1XXA CHILBLAINS: Status: ACTIVE | Noted: 2017-10-27

## 2019-08-05 LAB
ALBUMIN SERPL-MCNC: 3.3 G/DL (ref 3.4–5)
ALBUMIN UR-MCNC: 30 MG/DL
ALBUMIN UR-MCNC: ABNORMAL MG/DL
ALP SERPL-CCNC: 117 U/L (ref 40–150)
ALT SERPL W P-5'-P-CCNC: 47 U/L (ref 0–70)
ANION GAP SERPL CALCULATED.3IONS-SCNC: 6 MMOL/L (ref 3–14)
ANION GAP SERPL CALCULATED.3IONS-SCNC: 9 MMOL/L (ref 5–18)
APPEARANCE UR: ABNORMAL
APPEARANCE UR: CLEAR
AST SERPL W P-5'-P-CCNC: 32 U/L (ref 0–45)
BACTERIA #/AREA URNS HPF: ABNORMAL /HPF
BACTERIA #/AREA URNS HPF: ABNORMAL HPF
BASOPHILS # BLD AUTO: 0 10E9/L (ref 0–0.2)
BASOPHILS # BLD AUTO: 0 THOU/UL (ref 0–0.2)
BASOPHILS NFR BLD AUTO: 0 % (ref 0–2)
BASOPHILS NFR BLD AUTO: 0.2 %
BILIRUB SERPL-MCNC: 0.7 MG/DL (ref 0.2–1.3)
BILIRUB UR QL STRIP: NEGATIVE
BILIRUB UR QL STRIP: NEGATIVE
BUN SERPL-MCNC: 16 MG/DL (ref 8–22)
BUN SERPL-MCNC: 17 MG/DL (ref 7–30)
CALCIUM SERPL-MCNC: 9.2 MG/DL (ref 8.5–10.1)
CALCIUM SERPL-MCNC: 9.7 MG/DL (ref 8.5–10.5)
CHLORIDE BLD-SCNC: 105 MMOL/L (ref 98–107)
CHLORIDE SERPL-SCNC: 104 MMOL/L (ref 94–109)
CO2 BLDCOV-SCNC: 24 MMOL/L (ref 21–28)
CO2 SERPL-SCNC: 24 MMOL/L (ref 22–31)
CO2 SERPL-SCNC: 28 MMOL/L (ref 20–32)
COLOR UR AUTO: YELLOW
COLOR UR AUTO: YELLOW
CREAT SERPL-MCNC: 0.68 MG/DL (ref 0.7–1.3)
CREAT SERPL-MCNC: 0.73 MG/DL (ref 0.66–1.25)
DIFFERENTIAL METHOD BLD: ABNORMAL
EOSINOPHIL # BLD AUTO: 0 10E9/L (ref 0–0.7)
EOSINOPHIL # BLD AUTO: 0.1 THOU/UL (ref 0–0.4)
EOSINOPHIL NFR BLD AUTO: 0.3 %
EOSINOPHIL NFR BLD AUTO: 1 % (ref 0–6)
ERYTHROCYTE [DISTWIDTH] IN BLOOD BY AUTOMATED COUNT: 14.6 % (ref 10–15)
ERYTHROCYTE [DISTWIDTH] IN BLOOD BY AUTOMATED COUNT: 14.8 % (ref 11–14.5)
GFR SERPL CREATININE-BSD FRML MDRD: >60 ML/MIN/1.73M2
GFR SERPL CREATININE-BSD FRML MDRD: >90 ML/MIN/{1.73_M2}
GLUCOSE BLD-MCNC: 115 MG/DL (ref 70–125)
GLUCOSE SERPL-MCNC: 124 MG/DL (ref 70–99)
GLUCOSE UR STRIP-MCNC: NEGATIVE MG/DL
GLUCOSE UR STRIP-MCNC: NEGATIVE MG/DL
HCT VFR BLD AUTO: 41.1 % (ref 40–54)
HCT VFR BLD AUTO: 42.8 % (ref 40–53)
HGB BLD-MCNC: 12.8 G/DL (ref 14–18)
HGB BLD-MCNC: 13.3 G/DL (ref 13.3–17.7)
HGB UR QL STRIP: ABNORMAL
HGB UR QL STRIP: ABNORMAL
IMM GRANULOCYTES # BLD: 0 10E9/L (ref 0–0.4)
IMM GRANULOCYTES NFR BLD: 0.4 %
KETONES UR STRIP-MCNC: NEGATIVE MG/DL
KETONES UR STRIP-MCNC: NEGATIVE MG/DL
LACTATE BLD-SCNC: 2 MMOL/L (ref 0.7–2)
LACTATE BLD-SCNC: 2.6 MMOL/L (ref 0.7–2)
LACTATE BLD-SCNC: 2.7 MMOL/L (ref 0.7–2.1)
LEUKOCYTE ESTERASE UR QL STRIP: ABNORMAL
LEUKOCYTE ESTERASE UR QL STRIP: ABNORMAL
LYMPHOCYTES # BLD AUTO: 0.5 10E9/L (ref 0.8–5.3)
LYMPHOCYTES # BLD AUTO: 0.7 THOU/UL (ref 0.8–4.4)
LYMPHOCYTES NFR BLD AUTO: 5.5 %
LYMPHOCYTES NFR BLD AUTO: 8 % (ref 20–40)
MCH RBC QN AUTO: 29 PG (ref 27–34)
MCH RBC QN AUTO: 29.3 PG (ref 26.5–33)
MCHC RBC AUTO-ENTMCNC: 31.1 G/DL (ref 31.5–36.5)
MCHC RBC AUTO-ENTMCNC: 31.1 G/DL (ref 32–36)
MCV RBC AUTO: 93 FL (ref 80–100)
MCV RBC AUTO: 94 FL (ref 78–100)
MONOCYTES # BLD AUTO: 0.8 10E9/L (ref 0–1.3)
MONOCYTES # BLD AUTO: 0.8 THOU/UL (ref 0–0.9)
MONOCYTES NFR BLD AUTO: 7.7 %
MONOCYTES NFR BLD AUTO: 9 % (ref 2–10)
MUCOUS THREADS #/AREA URNS LPF: ABNORMAL LPF
MUCOUS THREADS #/AREA URNS LPF: PRESENT /LPF
NEUTROPHILS # BLD AUTO: 7.3 THOU/UL (ref 2–7.7)
NEUTROPHILS # BLD AUTO: 8.4 10E9/L (ref 1.6–8.3)
NEUTROPHILS NFR BLD AUTO: 82 % (ref 50–70)
NEUTROPHILS NFR BLD AUTO: 85.9 %
NITRATE UR QL: NEGATIVE
NITRATE UR QL: POSITIVE
NRBC # BLD AUTO: 0 10*3/UL
NRBC BLD AUTO-RTO: 0 /100
NT-PROBNP SERPL-MCNC: 198 PG/ML (ref 0–900)
PCO2 BLDV: 42 MM HG (ref 40–50)
PH BLDV: 7.37 PH (ref 7.32–7.43)
PH UR STRIP: 6.5 PH (ref 5–7)
PH UR STRIP: 7.5 [PH] (ref 4.5–8)
PLATELET # BLD AUTO: 235 10E9/L (ref 150–450)
PLATELET # BLD AUTO: 235 THOU/UL (ref 140–440)
PMV BLD AUTO: 10 FL (ref 8.5–12.5)
PO2 BLDV: 20 MM HG (ref 25–47)
POTASSIUM BLD-SCNC: 3.8 MMOL/L (ref 3.5–5)
POTASSIUM SERPL-SCNC: 4 MMOL/L (ref 3.4–5.3)
PROT SERPL-MCNC: 7.9 G/DL (ref 6.8–8.8)
RBC # BLD AUTO: 4.42 MILL/UL (ref 4.4–6.2)
RBC # BLD AUTO: 4.54 10E12/L (ref 4.4–5.9)
RBC #/AREA URNS AUTO: 76 /HPF (ref 0–2)
RBC #/AREA URNS AUTO: ABNORMAL HPF
SAO2 % BLDV FROM PO2: 31 %
SODIUM SERPL-SCNC: 138 MMOL/L (ref 133–144)
SODIUM SERPL-SCNC: 138 MMOL/L (ref 136–145)
SOURCE: ABNORMAL
SP GR UR STRIP: 1.01 (ref 1–1.03)
SP GR UR STRIP: 1.03 (ref 1–1.03)
SQUAMOUS #/AREA URNS AUTO: 1 /HPF (ref 0–1)
SQUAMOUS #/AREA URNS AUTO: ABNORMAL LPF
TRI-PHOS CRY #/AREA URNS HPF: PRESENT /[HPF]
TROPONIN I SERPL-MCNC: <0.015 UG/L (ref 0–0.04)
UROBILINOGEN UR STRIP-ACNC: ABNORMAL
UROBILINOGEN UR STRIP-MCNC: NORMAL MG/DL (ref 0–2)
WBC # BLD AUTO: 9.7 10E9/L (ref 4–11)
WBC #/AREA URNS AUTO: 78 /HPF (ref 0–5)
WBC #/AREA URNS AUTO: >100 HPF
WBC CLUMPS #/AREA URNS HPF: PRESENT /HPF
WBC: 8.9 THOU/UL (ref 4–11)

## 2019-08-05 PROCEDURE — 87040 BLOOD CULTURE FOR BACTERIA: CPT | Performed by: PHYSICIAN ASSISTANT

## 2019-08-05 PROCEDURE — 71046 X-RAY EXAM CHEST 2 VIEWS: CPT

## 2019-08-05 PROCEDURE — 36415 COLL VENOUS BLD VENIPUNCTURE: CPT | Performed by: PHYSICIAN ASSISTANT

## 2019-08-05 PROCEDURE — 93005 ELECTROCARDIOGRAM TRACING: CPT

## 2019-08-05 PROCEDURE — 94640 AIRWAY INHALATION TREATMENT: CPT

## 2019-08-05 PROCEDURE — 82803 BLOOD GASES ANY COMBINATION: CPT

## 2019-08-05 PROCEDURE — 25000125 ZZHC RX 250: Performed by: PHYSICIAN ASSISTANT

## 2019-08-05 PROCEDURE — 99285 EMERGENCY DEPT VISIT HI MDM: CPT | Mod: 25

## 2019-08-05 PROCEDURE — 87088 URINE BACTERIA CULTURE: CPT | Performed by: PHYSICIAN ASSISTANT

## 2019-08-05 PROCEDURE — 83605 ASSAY OF LACTIC ACID: CPT | Performed by: EMERGENCY MEDICINE

## 2019-08-05 PROCEDURE — 25000128 H RX IP 250 OP 636: Performed by: PHYSICIAN ASSISTANT

## 2019-08-05 PROCEDURE — 71260 CT THORAX DX C+: CPT

## 2019-08-05 PROCEDURE — 82805 BLOOD GASES W/O2 SATURATION: CPT | Performed by: STUDENT IN AN ORGANIZED HEALTH CARE EDUCATION/TRAINING PROGRAM

## 2019-08-05 PROCEDURE — 25800030 ZZH RX IP 258 OP 636: Performed by: EMERGENCY MEDICINE

## 2019-08-05 PROCEDURE — 96361 HYDRATE IV INFUSION ADD-ON: CPT

## 2019-08-05 PROCEDURE — 87800 DETECT AGNT MULT DNA DIREC: CPT | Performed by: PHYSICIAN ASSISTANT

## 2019-08-05 PROCEDURE — 87086 URINE CULTURE/COLONY COUNT: CPT | Performed by: PHYSICIAN ASSISTANT

## 2019-08-05 PROCEDURE — 85025 COMPLETE CBC W/AUTO DIFF WBC: CPT | Performed by: PHYSICIAN ASSISTANT

## 2019-08-05 PROCEDURE — 25000128 H RX IP 250 OP 636: Performed by: EMERGENCY MEDICINE

## 2019-08-05 PROCEDURE — 81001 URINALYSIS AUTO W/SCOPE: CPT | Performed by: PHYSICIAN ASSISTANT

## 2019-08-05 PROCEDURE — 83605 ASSAY OF LACTIC ACID: CPT | Performed by: PHYSICIAN ASSISTANT

## 2019-08-05 PROCEDURE — 25000132 ZZH RX MED GY IP 250 OP 250 PS 637: Mod: GY | Performed by: EMERGENCY MEDICINE

## 2019-08-05 PROCEDURE — 80053 COMPREHEN METABOLIC PANEL: CPT | Performed by: PHYSICIAN ASSISTANT

## 2019-08-05 PROCEDURE — 83880 ASSAY OF NATRIURETIC PEPTIDE: CPT | Performed by: PHYSICIAN ASSISTANT

## 2019-08-05 PROCEDURE — 87077 CULTURE AEROBIC IDENTIFY: CPT | Performed by: PHYSICIAN ASSISTANT

## 2019-08-05 PROCEDURE — 99223 1ST HOSP IP/OBS HIGH 75: CPT | Mod: AI | Performed by: STUDENT IN AN ORGANIZED HEALTH CARE EDUCATION/TRAINING PROGRAM

## 2019-08-05 PROCEDURE — 87186 SC STD MICRODIL/AGAR DIL: CPT | Performed by: PHYSICIAN ASSISTANT

## 2019-08-05 PROCEDURE — 84484 ASSAY OF TROPONIN QUANT: CPT | Performed by: PHYSICIAN ASSISTANT

## 2019-08-05 PROCEDURE — 83605 ASSAY OF LACTIC ACID: CPT

## 2019-08-05 PROCEDURE — 84145 PROCALCITONIN (PCT): CPT | Performed by: STUDENT IN AN ORGANIZED HEALTH CARE EDUCATION/TRAINING PROGRAM

## 2019-08-05 PROCEDURE — 96365 THER/PROPH/DIAG IV INF INIT: CPT

## 2019-08-05 RX ORDER — GUAIFENESIN 600 MG/1
1200 TABLET, EXTENDED RELEASE ORAL 2 TIMES DAILY
COMMUNITY

## 2019-08-05 RX ORDER — ACETAMINOPHEN 650 MG/1
650 SUPPOSITORY RECTAL ONCE
Status: COMPLETED | OUTPATIENT
Start: 2019-08-05 | End: 2019-08-05

## 2019-08-05 RX ORDER — DIMETHYL FUMARATE 240 MG/1
240 CAPSULE ORAL 2 TIMES DAILY
COMMUNITY

## 2019-08-05 RX ORDER — CEFTRIAXONE 1 G/1
1 INJECTION, POWDER, FOR SOLUTION INTRAMUSCULAR; INTRAVENOUS ONCE
Status: COMPLETED | OUTPATIENT
Start: 2019-08-05 | End: 2019-08-05

## 2019-08-05 RX ORDER — CITALOPRAM HYDROBROMIDE 10 MG/1
30 TABLET ORAL DAILY
COMMUNITY

## 2019-08-05 RX ORDER — ACETAMINOPHEN 325 MG/1
650 TABLET ORAL 3 TIMES DAILY
COMMUNITY

## 2019-08-05 RX ORDER — BACLOFEN 10 MG/1
30 TABLET ORAL 3 TIMES DAILY
COMMUNITY

## 2019-08-05 RX ORDER — MIRABEGRON 25 MG/1
25 TABLET, FILM COATED, EXTENDED RELEASE ORAL DAILY
COMMUNITY

## 2019-08-05 RX ORDER — ALBUTEROL SULFATE 0.83 MG/ML
2.5 SOLUTION RESPIRATORY (INHALATION) EVERY 4 HOURS PRN
COMMUNITY

## 2019-08-05 RX ORDER — POLYETHYLENE GLYCOL 3350 17 G/17G
1 POWDER, FOR SOLUTION ORAL DAILY
COMMUNITY

## 2019-08-05 RX ORDER — ACETAMINOPHEN 325 MG/1
650 TABLET ORAL DAILY PRN
COMMUNITY

## 2019-08-05 RX ORDER — IPRATROPIUM BROMIDE AND ALBUTEROL SULFATE 2.5; .5 MG/3ML; MG/3ML
3 SOLUTION RESPIRATORY (INHALATION) ONCE
Status: COMPLETED | OUTPATIENT
Start: 2019-08-05 | End: 2019-08-05

## 2019-08-05 RX ORDER — METHENAMINE MANDELATE 1000 MG/1
1000 TABLET, FILM COATED ORAL 2 TIMES DAILY
COMMUNITY

## 2019-08-05 RX ORDER — GLYCOPYRROLATE 1 MG/1
1 TABLET ORAL 3 TIMES DAILY
COMMUNITY

## 2019-08-05 RX ORDER — AMOXICILLIN 250 MG
1 CAPSULE ORAL 2 TIMES DAILY
COMMUNITY

## 2019-08-05 RX ORDER — DOXYCYCLINE HYCLATE 100 MG
100 TABLET ORAL 2 TIMES DAILY
Status: ON HOLD | COMMUNITY
End: 2019-08-13

## 2019-08-05 RX ORDER — IPRATROPIUM BROMIDE AND ALBUTEROL SULFATE 2.5; .5 MG/3ML; MG/3ML
3 SOLUTION RESPIRATORY (INHALATION) ONCE
Status: DISCONTINUED | OUTPATIENT
Start: 2019-08-05 | End: 2019-08-05

## 2019-08-05 RX ORDER — CEFDINIR 300 MG/1
300 CAPSULE ORAL 2 TIMES DAILY
Qty: 20 CAPSULE | Refills: 0 | Status: ON HOLD | OUTPATIENT
Start: 2019-08-05 | End: 2019-08-06

## 2019-08-05 RX ORDER — CITRIC ACID, GLUCONOLACTONE AND MAGNESIUM CARBONATE 6.602; .198; 3.268 G/100ML; G/100ML; G/100ML
30 SOLUTION IRRIGATION
COMMUNITY

## 2019-08-05 RX ORDER — TRAMADOL HYDROCHLORIDE 50 MG/1
100 TABLET ORAL 3 TIMES DAILY
Status: ON HOLD | COMMUNITY
End: 2019-08-13

## 2019-08-05 RX ORDER — IOPAMIDOL 755 MG/ML
500 INJECTION, SOLUTION INTRAVASCULAR ONCE
Status: COMPLETED | OUTPATIENT
Start: 2019-08-05 | End: 2019-08-05

## 2019-08-05 RX ORDER — MODAFINIL 100 MG/1
100 TABLET ORAL DAILY
COMMUNITY

## 2019-08-05 RX ADMIN — ACETAMINOPHEN 650 MG: 650 SUPPOSITORY RECTAL at 23:14

## 2019-08-05 RX ADMIN — SODIUM CHLORIDE 80 ML: 9 INJECTION, SOLUTION INTRAVENOUS at 19:14

## 2019-08-05 RX ADMIN — IPRATROPIUM BROMIDE AND ALBUTEROL SULFATE 3 ML: .5; 3 SOLUTION RESPIRATORY (INHALATION) at 20:15

## 2019-08-05 RX ADMIN — SODIUM CHLORIDE, POTASSIUM CHLORIDE, SODIUM LACTATE AND CALCIUM CHLORIDE 1000 ML: 600; 310; 30; 20 INJECTION, SOLUTION INTRAVENOUS at 23:14

## 2019-08-05 RX ADMIN — IOPAMIDOL 76 ML: 755 INJECTION, SOLUTION INTRAVENOUS at 19:14

## 2019-08-05 RX ADMIN — SODIUM CHLORIDE 1000 ML: 9 INJECTION, SOLUTION INTRAVENOUS at 20:15

## 2019-08-05 RX ADMIN — CEFTRIAXONE 1 G: 1 INJECTION, POWDER, FOR SOLUTION INTRAMUSCULAR; INTRAVENOUS at 20:46

## 2019-08-05 ASSESSMENT — ENCOUNTER SYMPTOMS
FEVER: 1
FLANK PAIN: 0
CHILLS: 1
NECK STIFFNESS: 0
DIARRHEA: 0
VOMITING: 0
HEADACHES: 0
DIZZINESS: 0
COUGH: 1
ABDOMINAL PAIN: 0

## 2019-08-05 NOTE — ED PROVIDER NOTES
History     Chief Complaint:    Fever      The history is limited by the condition of the patient.      Wm Belcher is a 64 year old male with complex medical history including an STEMI, coronary artery disease, pericarditis, multiple sclerosis, prior UTIs.  He presents by ambulance from facility with concern of low-grade fevers over the last several days.  They have additionally noted that his oxygen saturations were somewhat below his baseline around 90 to 91%.  Here in the emergency department he denies any pain.  He has an indwelling suprapubic catheter.  Chart review notes a history of COPD, however he is not currently on medications for this.  He denies any vomiting, diarrhea, or abdominal pain.  No headache or neck stiffness.    Allergies:  Oxycodone    Medications:    Loratadine (Claritin)     Polyethylene Glycol (Miralax)   Aspirin Chewable 81 Mg   Nitroglycerin (Nitrostat)   Dimethyl Fumarate (Tecfidera)  Baclofen (Lioresal)   Tramadol (Ultram)   Atorvastatin (Lipitor)   Metoprolol Tartrate (Lopressor)  Furosemide (Lasix)   Clopidogrel (Plavix)       Past Medical History:    Pernio    Rales   Pericarditis   ASCVD (arteriosclerotic cardiovascular disease)   S/P VIANEY to RCA    NSTEMI   Multiple sclerosis   Osteomyelitis of knee region     Past Surgical History:    No past surgical history on file.    Family History:    No family history on file.    Social History:  The patient was alone.  Smoking Status: Former  Smokeless Tobacco: Never  Alcohol Use: No    Marital Status:  Single     Review of Systems   Constitutional: Positive for chills and fever.   Respiratory: Positive for cough.    Cardiovascular: Negative for chest pain.   Gastrointestinal: Negative for abdominal pain, diarrhea and vomiting.   Genitourinary: Negative for flank pain.   Musculoskeletal: Negative for neck stiffness.   Skin: Negative for rash.   Neurological: Negative for dizziness and headaches.   All other systems reviewed and are  negative.    Physical Exam   First Vitals:  BP: 137/85  Pulse: 117  Heart Rate: 114  Temp: 99  F (37.2  C)  Resp: 16  Weight: 86.2 kg (190 lb)  SpO2: 91 %      Physical Exam  General: Elderly appearing.  Awake.  Head:  Scalp is NC/AT  Eyes:  No scleral icterus, PERRL  ENT:  The external nose and ears are normal.   Neck:  Normal range of motion without rigidity.  CV:  Mildly tachycardic.    No pathologic murmur, rubs, or gallops.  Resp:  Breath sounds with slight end expiratory wheezing bilaterally.  No crackles or rhonchi.  Non-labored, no retractions or accessory muscle use  GI:  Abdomen is soft, no distension, no tenderness, no masses. No peritoneal signs.  Bowel sounds present in all quadrants  :  Suprapubic catheter in place with urine in bag.  No suprapubic or flank tenderness.  MS:  No lower extremity edema or asymmetric calf swelling.  Skin:  Warm and dry, No rash or lesions noted.  Neuro: Oriented. No gross motor deficits.    Strength and sensation grossly intact in all 4 extremities.  No facial asymmetry. GCS: 15. Normal finger to nose and heel to shin testing.  Gait normal  Psych: Awake. Alert. Normal affect. Appropriate interactions.      Emergency Department Course     ECG:  Indication: Condition of patient  Time: 1758  Vent. Rate 113 bpm. KY interval 152. QRS duration 78. QT/QTc 322/441. P-R-T axis 57 14 64.  Sinus tachycardia. Low voltage QRS. Borderline ECG.   Read time: 1800    Imaging:  Radiographic findings were communicated with the patient who voiced understanding of the findings.    XR Chest PA and LAT:   FINDINGS: Atelectasis and volume loss in the right lower lobe with a platelike area of dense consolidation. This could represent a postobstructive pneumonitis. CT would be helpful to further characterize. Heart size normal, as per radiology.    Laboratory:  Labs Ordered and Resulted from Time of ED Arrival Up to the Time of Departure from the ED   ROUTINE UA WITH MICROSCOPIC - Abnormal;  Notable for the following components:       Result Value    Blood Urine Moderate (*)     Protein Albumin Urine 30 (*)     Leukocyte Esterase Urine Large (*)     WBC Urine 78 (*)     RBC Urine 76 (*)     WBC Clumps Present (*)     Bacteria Urine Few (*)     Mucous Urine Present (*)     All other components within normal limits   CBC WITH PLATELETS DIFFERENTIAL - Abnormal; Notable for the following components:    MCHC 31.1 (*)     Absolute Neutrophil 8.4 (*)     Absolute Lymphocytes 0.5 (*)     All other components within normal limits   COMPREHENSIVE METABOLIC PANEL - Abnormal; Notable for the following components:    Glucose 124 (*)     Albumin 3.3 (*)     All other components within normal limits   LACTIC ACID WHOLE BLOOD   NT PROBNP INPATIENT   TROPONIN I   PULSE OXIMETRY NURSING   CARDIAC CONTINUOUS MONITORING   MEASURE URINE OUTPUT   PATIENT CARE ORDER   ISTAT CG4 GASES LACTATE ESTEBAN NURSING POCT   URINE CULTURE AEROBIC BACTERIAL   BLOOD CULTURE   BLOOD CULTURE       Interventions:  Medications   0.9% sodium chloride BOLUS (1,000 mLs Intravenous New Bag 8/5/19 2015)   cefTRIAXone (ROCEPHIN) 1 g vial to attach to  mL bag for ADULTS or NS 50 mL bag for PEDS (has no administration in time range)   0.9% sodium chloride BOLUS (0 mLs Intravenous Stopped 8/5/19 1924)   iopamidol (ISOVUE-370) solution 500 mL (76 mLs Intravenous Given 8/5/19 1914)   ipratropium - albuterol 0.5 mg/2.5 mg/3 mL (DUONEB) neb solution 3 mL (3 mLs Nebulization Given 8/5/19 2015)     Emergency Department Course:  Nursing notes and vitals reviewed. (4378) I performed an exam of the patient as documented above.     IV inserted. Medicine administered as documented above. Blood drawn. This was sent to the lab for further testing, results above.     The patient was sent for a chest PA and LAT XR while in the emergency department, findings above.     Findings and plan explained to the Patient. Patient discharged home with instructions regarding  supportive care, medications, and reasons to return. The importance of close follow-up was reviewed. The patient was prescribed medications as below.    Impression & Plan      Medical Decision Makin-year-old male who presents from assisted living facility with low-grade fever over the last several days.  Patient history and records reviewed.  A broad differential was considered.  The patient denies any pain at present.  Work-up here in the emergency department demonstrates evidence of a catheter associated UTI following replacement of suprapubic catheter.  He is afebrile here with a normal white blood cell count and lactic acid and no evidence of severe sepsis or septic shock.  He was initially noted to have slightly low O2 sats around 90-91, and so CT of the chest was obtained which showed no evidence of pulmonary embolism, pneumonia, or pneumothorax.  EKG does not show ischemia or arrhythmia and troponin is negative.  BNP also within normal range and no clinical evidence of congestive heart failure.  He does have a prior history of COPD, but does not appear to have significant wheezing on exam or signs of respiratory distress and is not currently prescribed inhalers.  He has a benign abdominal examination with no tenderness to palpation to suggest intra-abdominal catastrophe.  There are no rashes or signs of cellulitis or infected joint.  I doubt other serious infection such as endocarditis, meningitis, bacteremia at this time.    On recheck, the patient is satting well on room air to 94% following a single DuoNeb.  He was given a dose of Rocephin and fluids here in the emergency department.  I feel he is safe for discharge back to care facility at this time.  We will prescribe Cefdinir, and the patient will follow-up with primary care provider tomorrow for re-check.  He will return if new or worsening symptoms, fever, pain, etc.    Diagnosis:    ICD-10-CM    1. Catheter-associated urinary tract infection  (H) T83.511A     N39.0        Disposition:  discharged back to facility.    Discharge Medications:     Medication List      Started    cefdinir 300 MG capsule  Commonly known as:  OMNICEF  300 mg, Oral, 2 TIMES DAILY            Scribe Disclosure:  I, Lukasz Coon, am serving as a scribe at 4:36 PM on 8/5/2019 to document services personally performed by Dalton Ford based on my observations and the provider's statements to me.     8/5/2019   [facility] EMERGENCY DEPARTMENT       Dalton Ford PA-C  08/05/19 2045

## 2019-08-05 NOTE — ED TRIAGE NOTES
Pt arrives via EMS from nursing home d/t 1-2 days fever. Pt sating 89-90% on RA. Hx MS. Denies pain. Hx frequent UTIs. ABC intact.

## 2019-08-06 ENCOUNTER — APPOINTMENT (OUTPATIENT)
Dept: SPEECH THERAPY | Facility: CLINIC | Age: 64
DRG: 698 | End: 2019-08-06
Attending: HOSPITALIST
Payer: MEDICARE

## 2019-08-06 ENCOUNTER — APPOINTMENT (OUTPATIENT)
Dept: GENERAL RADIOLOGY | Facility: CLINIC | Age: 64
DRG: 698 | End: 2019-08-06
Attending: HOSPITALIST
Payer: MEDICARE

## 2019-08-06 ENCOUNTER — APPOINTMENT (OUTPATIENT)
Dept: GENERAL RADIOLOGY | Facility: CLINIC | Age: 64
DRG: 698 | End: 2019-08-06
Attending: INTERNAL MEDICINE
Payer: MEDICARE

## 2019-08-06 LAB
ALBUMIN SERPL-MCNC: 2.4 G/DL (ref 3.4–5)
ALP SERPL-CCNC: 159 U/L (ref 40–150)
ALT SERPL W P-5'-P-CCNC: 65 U/L (ref 0–70)
ANION GAP SERPL CALCULATED.3IONS-SCNC: 5 MMOL/L (ref 3–14)
ANION GAP SERPL CALCULATED.3IONS-SCNC: 9 MMOL/L (ref 3–14)
AST SERPL W P-5'-P-CCNC: 60 U/L (ref 0–45)
BASE EXCESS BLDV CALC-SCNC: 0.1 MMOL/L
BILIRUB SERPL-MCNC: 0.6 MG/DL (ref 0.2–1.3)
BUN SERPL-MCNC: 14 MG/DL (ref 7–30)
BUN SERPL-MCNC: 15 MG/DL (ref 7–30)
CALCIUM SERPL-MCNC: 8.3 MG/DL (ref 8.5–10.1)
CALCIUM SERPL-MCNC: 8.4 MG/DL (ref 8.5–10.1)
CHLORIDE SERPL-SCNC: 108 MMOL/L (ref 94–109)
CHLORIDE SERPL-SCNC: 109 MMOL/L (ref 94–109)
CO2 SERPL-SCNC: 22 MMOL/L (ref 20–32)
CO2 SERPL-SCNC: 26 MMOL/L (ref 20–32)
CREAT SERPL-MCNC: 0.74 MG/DL (ref 0.66–1.25)
CREAT SERPL-MCNC: 0.74 MG/DL (ref 0.66–1.25)
ERYTHROCYTE [DISTWIDTH] IN BLOOD BY AUTOMATED COUNT: 14.8 % (ref 10–15)
ERYTHROCYTE [DISTWIDTH] IN BLOOD BY AUTOMATED COUNT: 14.8 % (ref 10–15)
GFR SERPL CREATININE-BSD FRML MDRD: >90 ML/MIN/{1.73_M2}
GFR SERPL CREATININE-BSD FRML MDRD: >90 ML/MIN/{1.73_M2}
GLUCOSE BLDC GLUCOMTR-MCNC: 100 MG/DL (ref 70–99)
GLUCOSE SERPL-MCNC: 104 MG/DL (ref 70–99)
GLUCOSE SERPL-MCNC: 86 MG/DL (ref 70–99)
HCO3 BLDV-SCNC: 25 MMOL/L (ref 21–28)
HCT VFR BLD AUTO: 35.4 % (ref 40–53)
HCT VFR BLD AUTO: 38.5 % (ref 40–53)
HGB BLD-MCNC: 11.6 G/DL (ref 13.3–17.7)
HGB BLD-MCNC: 12.4 G/DL (ref 13.3–17.7)
INTERPRETATION ECG - MUSE: NORMAL
INTERPRETATION ECG - MUSE: NORMAL
LACTATE BLD-SCNC: 1.4 MMOL/L (ref 0.7–2)
LACTATE BLD-SCNC: 3.1 MMOL/L (ref 0.7–2)
MCH RBC QN AUTO: 30 PG (ref 26.5–33)
MCH RBC QN AUTO: 30.1 PG (ref 26.5–33)
MCHC RBC AUTO-ENTMCNC: 32.2 G/DL (ref 31.5–36.5)
MCHC RBC AUTO-ENTMCNC: 32.8 G/DL (ref 31.5–36.5)
MCV RBC AUTO: 92 FL (ref 78–100)
MCV RBC AUTO: 93 FL (ref 78–100)
O2/TOTAL GAS SETTING VFR VENT: NORMAL %
OXYHGB MFR BLDV: 53 %
PCO2 BLDV: 43 MM HG (ref 40–50)
PH BLDV: 7.38 PH (ref 7.32–7.43)
PLATELET # BLD AUTO: 132 10E9/L (ref 150–450)
PLATELET # BLD AUTO: 166 10E9/L (ref 150–450)
PO2 BLDV: 29 MM HG (ref 25–47)
POTASSIUM SERPL-SCNC: 3.2 MMOL/L (ref 3.4–5.3)
POTASSIUM SERPL-SCNC: 3.6 MMOL/L (ref 3.4–5.3)
PROCALCITONIN SERPL-MCNC: 0.48 NG/ML
PROT SERPL-MCNC: 6.1 G/DL (ref 6.8–8.8)
RBC # BLD AUTO: 3.85 10E12/L (ref 4.4–5.9)
RBC # BLD AUTO: 4.13 10E12/L (ref 4.4–5.9)
SODIUM SERPL-SCNC: 139 MMOL/L (ref 133–144)
SODIUM SERPL-SCNC: 140 MMOL/L (ref 133–144)
WBC # BLD AUTO: 5.9 10E9/L (ref 4–11)
WBC # BLD AUTO: 9.8 10E9/L (ref 4–11)

## 2019-08-06 PROCEDURE — 25000132 ZZH RX MED GY IP 250 OP 250 PS 637: Mod: GY | Performed by: INTERNAL MEDICINE

## 2019-08-06 PROCEDURE — 25800030 ZZH RX IP 258 OP 636: Performed by: STUDENT IN AN ORGANIZED HEALTH CARE EDUCATION/TRAINING PROGRAM

## 2019-08-06 PROCEDURE — 40000986 XR CHEST PORT 1 VW

## 2019-08-06 PROCEDURE — 00000146 ZZHCL STATISTIC GLUCOSE BY METER IP

## 2019-08-06 PROCEDURE — 36415 COLL VENOUS BLD VENIPUNCTURE: CPT | Performed by: HOSPITALIST

## 2019-08-06 PROCEDURE — 25000128 H RX IP 250 OP 636: Performed by: INTERNAL MEDICINE

## 2019-08-06 PROCEDURE — 25000125 ZZHC RX 250: Performed by: HOSPITALIST

## 2019-08-06 PROCEDURE — G0463 HOSPITAL OUTPT CLINIC VISIT: HCPCS

## 2019-08-06 PROCEDURE — 80048 BASIC METABOLIC PNL TOTAL CA: CPT | Performed by: STUDENT IN AN ORGANIZED HEALTH CARE EDUCATION/TRAINING PROGRAM

## 2019-08-06 PROCEDURE — 85027 COMPLETE CBC AUTOMATED: CPT | Performed by: STUDENT IN AN ORGANIZED HEALTH CARE EDUCATION/TRAINING PROGRAM

## 2019-08-06 PROCEDURE — 25000128 H RX IP 250 OP 636: Performed by: STUDENT IN AN ORGANIZED HEALTH CARE EDUCATION/TRAINING PROGRAM

## 2019-08-06 PROCEDURE — 92610 EVALUATE SWALLOWING FUNCTION: CPT | Mod: GN | Performed by: SPEECH-LANGUAGE PATHOLOGIST

## 2019-08-06 PROCEDURE — 36569 INSJ PICC 5 YR+ W/O IMAGING: CPT

## 2019-08-06 PROCEDURE — 36415 COLL VENOUS BLD VENIPUNCTURE: CPT | Performed by: INTERNAL MEDICINE

## 2019-08-06 PROCEDURE — 40000274 ZZH STATISTIC RCP CONSULT EA 30 MIN

## 2019-08-06 PROCEDURE — 94640 AIRWAY INHALATION TREATMENT: CPT | Mod: 76

## 2019-08-06 PROCEDURE — 87040 BLOOD CULTURE FOR BACTERIA: CPT | Performed by: HOSPITALIST

## 2019-08-06 PROCEDURE — 80053 COMPREHEN METABOLIC PANEL: CPT | Performed by: INTERNAL MEDICINE

## 2019-08-06 PROCEDURE — 99233 SBSQ HOSP IP/OBS HIGH 50: CPT | Performed by: HOSPITALIST

## 2019-08-06 PROCEDURE — 25000125 ZZHC RX 250: Performed by: STUDENT IN AN ORGANIZED HEALTH CARE EDUCATION/TRAINING PROGRAM

## 2019-08-06 PROCEDURE — 25000132 ZZH RX MED GY IP 250 OP 250 PS 637: Mod: GY | Performed by: STUDENT IN AN ORGANIZED HEALTH CARE EDUCATION/TRAINING PROGRAM

## 2019-08-06 PROCEDURE — 83605 ASSAY OF LACTIC ACID: CPT | Performed by: HOSPITALIST

## 2019-08-06 PROCEDURE — 25800030 ZZH RX IP 258 OP 636: Performed by: HOSPITALIST

## 2019-08-06 PROCEDURE — 25000128 H RX IP 250 OP 636: Performed by: HOSPITALIST

## 2019-08-06 PROCEDURE — 87641 MR-STAPH DNA AMP PROBE: CPT | Performed by: INTERNAL MEDICINE

## 2019-08-06 PROCEDURE — 40000275 ZZH STATISTIC RCP TIME EA 10 MIN

## 2019-08-06 PROCEDURE — 87640 STAPH A DNA AMP PROBE: CPT | Performed by: INTERNAL MEDICINE

## 2019-08-06 PROCEDURE — 85027 COMPLETE CBC AUTOMATED: CPT | Performed by: INTERNAL MEDICINE

## 2019-08-06 PROCEDURE — 71045 X-RAY EXAM CHEST 1 VIEW: CPT

## 2019-08-06 PROCEDURE — 93005 ELECTROCARDIOGRAM TRACING: CPT

## 2019-08-06 PROCEDURE — 27210222 ZZH KIT SHRLOCK 6FR POWER PICC

## 2019-08-06 PROCEDURE — 92526 ORAL FUNCTION THERAPY: CPT | Mod: GN | Performed by: SPEECH-LANGUAGE PATHOLOGIST

## 2019-08-06 PROCEDURE — 20000003 ZZH R&B ICU

## 2019-08-06 PROCEDURE — 94640 AIRWAY INHALATION TREATMENT: CPT

## 2019-08-06 PROCEDURE — 99291 CRITICAL CARE FIRST HOUR: CPT | Performed by: INTERNAL MEDICINE

## 2019-08-06 RX ORDER — GLYCOPYRROLATE 1 MG/1
1 TABLET ORAL 3 TIMES DAILY
Status: DISCONTINUED | OUTPATIENT
Start: 2019-08-06 | End: 2019-08-06

## 2019-08-06 RX ORDER — NALOXONE HYDROCHLORIDE 0.4 MG/ML
.1-.4 INJECTION, SOLUTION INTRAMUSCULAR; INTRAVENOUS; SUBCUTANEOUS
Status: DISCONTINUED | OUTPATIENT
Start: 2019-08-06 | End: 2019-08-06

## 2019-08-06 RX ORDER — POTASSIUM CHLORIDE 7.45 MG/ML
10 INJECTION INTRAVENOUS
Status: DISCONTINUED | OUTPATIENT
Start: 2019-08-06 | End: 2019-08-13 | Stop reason: HOSPADM

## 2019-08-06 RX ORDER — CEFTRIAXONE 1 G/1
1 INJECTION, POWDER, FOR SOLUTION INTRAMUSCULAR; INTRAVENOUS EVERY 24 HOURS
Status: DISCONTINUED | OUTPATIENT
Start: 2019-08-06 | End: 2019-08-06

## 2019-08-06 RX ORDER — IPRATROPIUM BROMIDE AND ALBUTEROL SULFATE 2.5; .5 MG/3ML; MG/3ML
3 SOLUTION RESPIRATORY (INHALATION) EVERY 4 HOURS
Status: DISCONTINUED | OUTPATIENT
Start: 2019-08-06 | End: 2019-08-11

## 2019-08-06 RX ORDER — ALBUTEROL SULFATE 0.83 MG/ML
2.5 SOLUTION RESPIRATORY (INHALATION) EVERY 4 HOURS PRN
Status: DISCONTINUED | OUTPATIENT
Start: 2019-08-06 | End: 2019-08-13 | Stop reason: HOSPADM

## 2019-08-06 RX ORDER — ONDANSETRON 4 MG/1
4 TABLET, ORALLY DISINTEGRATING ORAL EVERY 6 HOURS PRN
Status: DISCONTINUED | OUTPATIENT
Start: 2019-08-06 | End: 2019-08-13 | Stop reason: HOSPADM

## 2019-08-06 RX ORDER — SODIUM CHLORIDE, SODIUM LACTATE, POTASSIUM CHLORIDE, CALCIUM CHLORIDE 600; 310; 30; 20 MG/100ML; MG/100ML; MG/100ML; MG/100ML
INJECTION, SOLUTION INTRAVENOUS CONTINUOUS
Status: DISCONTINUED | OUTPATIENT
Start: 2019-08-06 | End: 2019-08-13 | Stop reason: HOSPADM

## 2019-08-06 RX ORDER — HEPARIN SODIUM,PORCINE 10 UNIT/ML
2-5 VIAL (ML) INTRAVENOUS
Status: DISCONTINUED | OUTPATIENT
Start: 2019-08-06 | End: 2019-08-13 | Stop reason: HOSPADM

## 2019-08-06 RX ORDER — ACETAMINOPHEN 650 MG/1
650 SUPPOSITORY RECTAL EVERY 4 HOURS PRN
Status: DISCONTINUED | OUTPATIENT
Start: 2019-08-06 | End: 2019-08-06

## 2019-08-06 RX ORDER — BACLOFEN 10 MG/1
30 TABLET ORAL 3 TIMES DAILY
Status: DISCONTINUED | OUTPATIENT
Start: 2019-08-06 | End: 2019-08-06

## 2019-08-06 RX ORDER — TRAMADOL HYDROCHLORIDE 50 MG/1
100 TABLET ORAL EVERY 8 HOURS PRN
Status: DISCONTINUED | OUTPATIENT
Start: 2019-08-06 | End: 2019-08-13 | Stop reason: HOSPADM

## 2019-08-06 RX ORDER — METRONIDAZOLE 500 MG/1
500 TABLET ORAL 3 TIMES DAILY
Status: DISCONTINUED | OUTPATIENT
Start: 2019-08-06 | End: 2019-08-06

## 2019-08-06 RX ORDER — CIPROFLOXACIN 500 MG/1
500 TABLET, FILM COATED ORAL PRN
COMMUNITY

## 2019-08-06 RX ORDER — AMOXICILLIN 250 MG
1 CAPSULE ORAL 2 TIMES DAILY
Status: DISCONTINUED | OUTPATIENT
Start: 2019-08-06 | End: 2019-08-09

## 2019-08-06 RX ORDER — PROCHLORPERAZINE 25 MG
25 SUPPOSITORY, RECTAL RECTAL EVERY 12 HOURS PRN
Status: DISCONTINUED | OUTPATIENT
Start: 2019-08-06 | End: 2019-08-13 | Stop reason: HOSPADM

## 2019-08-06 RX ORDER — DIMETHYL FUMARATE 240 MG/1
240 CAPSULE ORAL 2 TIMES DAILY
Status: DISCONTINUED | OUTPATIENT
Start: 2019-08-06 | End: 2019-08-06

## 2019-08-06 RX ORDER — GUAIFENESIN 600 MG/1
1200 TABLET, EXTENDED RELEASE ORAL 2 TIMES DAILY
Status: DISCONTINUED | OUTPATIENT
Start: 2019-08-06 | End: 2019-08-06

## 2019-08-06 RX ORDER — LIDOCAINE 40 MG/G
CREAM TOPICAL
Status: DISCONTINUED | OUTPATIENT
Start: 2019-08-06 | End: 2019-08-13

## 2019-08-06 RX ORDER — AZITHROMYCIN 250 MG/1
250 TABLET, FILM COATED ORAL DAILY
Status: DISCONTINUED | OUTPATIENT
Start: 2019-08-07 | End: 2019-08-06

## 2019-08-06 RX ORDER — METOPROLOL TARTRATE 1 MG/ML
2.5 INJECTION, SOLUTION INTRAVENOUS EVERY 6 HOURS PRN
Status: DISCONTINUED | OUTPATIENT
Start: 2019-08-06 | End: 2019-08-08

## 2019-08-06 RX ORDER — ASPIRIN 81 MG/1
81 TABLET, CHEWABLE ORAL DAILY
Status: DISCONTINUED | OUTPATIENT
Start: 2019-08-06 | End: 2019-08-06

## 2019-08-06 RX ORDER — IPRATROPIUM BROMIDE AND ALBUTEROL SULFATE 2.5; .5 MG/3ML; MG/3ML
3 SOLUTION RESPIRATORY (INHALATION)
Status: DISCONTINUED | OUTPATIENT
Start: 2019-08-06 | End: 2019-08-06

## 2019-08-06 RX ORDER — NALOXONE HYDROCHLORIDE 0.4 MG/ML
.1-.4 INJECTION, SOLUTION INTRAMUSCULAR; INTRAVENOUS; SUBCUTANEOUS
Status: DISCONTINUED | OUTPATIENT
Start: 2019-08-06 | End: 2019-08-13 | Stop reason: HOSPADM

## 2019-08-06 RX ORDER — ATORVASTATIN CALCIUM 40 MG/1
40 TABLET, FILM COATED ORAL EVERY EVENING
COMMUNITY

## 2019-08-06 RX ORDER — PROCHLORPERAZINE MALEATE 10 MG
10 TABLET ORAL EVERY 6 HOURS PRN
Status: DISCONTINUED | OUTPATIENT
Start: 2019-08-06 | End: 2019-08-13 | Stop reason: HOSPADM

## 2019-08-06 RX ORDER — TRAMADOL HYDROCHLORIDE 50 MG/1
100 TABLET ORAL DAILY PRN
Status: ON HOLD | COMMUNITY
End: 2019-08-13

## 2019-08-06 RX ORDER — POTASSIUM CHLORIDE 29.8 MG/ML
20 INJECTION INTRAVENOUS
Status: DISCONTINUED | OUTPATIENT
Start: 2019-08-06 | End: 2019-08-13 | Stop reason: HOSPADM

## 2019-08-06 RX ORDER — MODAFINIL 100 MG/1
100 TABLET ORAL DAILY
Status: DISCONTINUED | OUTPATIENT
Start: 2019-08-06 | End: 2019-08-06

## 2019-08-06 RX ORDER — ONDANSETRON 2 MG/ML
4 INJECTION INTRAMUSCULAR; INTRAVENOUS EVERY 6 HOURS PRN
Status: DISCONTINUED | OUTPATIENT
Start: 2019-08-06 | End: 2019-08-13 | Stop reason: HOSPADM

## 2019-08-06 RX ORDER — POLYETHYLENE GLYCOL 3350 17 G/17G
17 POWDER, FOR SOLUTION ORAL DAILY
Status: DISCONTINUED | OUTPATIENT
Start: 2019-08-06 | End: 2019-08-09

## 2019-08-06 RX ORDER — ASPIRIN 81 MG/1
81 TABLET, CHEWABLE ORAL DAILY
COMMUNITY

## 2019-08-06 RX ORDER — NITROGLYCERIN 0.4 MG/1
0.4 TABLET SUBLINGUAL EVERY 5 MIN PRN
COMMUNITY

## 2019-08-06 RX ORDER — BISACODYL 10 MG
10 SUPPOSITORY, RECTAL RECTAL DAILY PRN
COMMUNITY

## 2019-08-06 RX ORDER — ACETAMINOPHEN 650 MG/1
650 SUPPOSITORY RECTAL EVERY 4 HOURS PRN
Status: DISCONTINUED | OUTPATIENT
Start: 2019-08-06 | End: 2019-08-09

## 2019-08-06 RX ORDER — POTASSIUM CL/LIDO/0.9 % NACL 10MEQ/0.1L
10 INTRAVENOUS SOLUTION, PIGGYBACK (ML) INTRAVENOUS
Status: DISCONTINUED | OUTPATIENT
Start: 2019-08-06 | End: 2019-08-13 | Stop reason: HOSPADM

## 2019-08-06 RX ADMIN — IPRATROPIUM BROMIDE AND ALBUTEROL SULFATE 3 ML: .5; 3 SOLUTION RESPIRATORY (INHALATION) at 20:42

## 2019-08-06 RX ADMIN — ACETAMINOPHEN 325 MG: 325 SUPPOSITORY RECTAL at 19:40

## 2019-08-06 RX ADMIN — METOPROLOL TARTRATE 2.5 MG: 5 INJECTION INTRAVENOUS at 00:54

## 2019-08-06 RX ADMIN — CEFEPIME HYDROCHLORIDE 2 G: 2 INJECTION, POWDER, FOR SOLUTION INTRAVENOUS at 17:18

## 2019-08-06 RX ADMIN — ENOXAPARIN SODIUM 40 MG: 40 INJECTION SUBCUTANEOUS at 16:23

## 2019-08-06 RX ADMIN — METRONIDAZOLE 500 MG: 500 INJECTION, SOLUTION INTRAVENOUS at 22:44

## 2019-08-06 RX ADMIN — METRONIDAZOLE 500 MG: 500 INJECTION, SOLUTION INTRAVENOUS at 18:07

## 2019-08-06 RX ADMIN — AZITHROMYCIN MONOHYDRATE 500 MG: 500 INJECTION, POWDER, LYOPHILIZED, FOR SOLUTION INTRAVENOUS at 01:12

## 2019-08-06 RX ADMIN — SODIUM CHLORIDE, POTASSIUM CHLORIDE, SODIUM LACTATE AND CALCIUM CHLORIDE 500 ML: 600; 310; 30; 20 INJECTION, SOLUTION INTRAVENOUS at 16:22

## 2019-08-06 RX ADMIN — SODIUM CHLORIDE, POTASSIUM CHLORIDE, SODIUM LACTATE AND CALCIUM CHLORIDE: 600; 310; 30; 20 INJECTION, SOLUTION INTRAVENOUS at 12:06

## 2019-08-06 RX ADMIN — SODIUM CHLORIDE 1000 ML: 9 INJECTION, SOLUTION INTRAVENOUS at 19:17

## 2019-08-06 RX ADMIN — ACETAMINOPHEN 650 MG: 650 SUPPOSITORY RECTAL at 16:31

## 2019-08-06 RX ADMIN — IPRATROPIUM BROMIDE AND ALBUTEROL SULFATE 3 ML: .5; 3 SOLUTION RESPIRATORY (INHALATION) at 23:33

## 2019-08-06 RX ADMIN — BACLOFEN 30 MG: 10 TABLET ORAL at 09:29

## 2019-08-06 RX ADMIN — IPRATROPIUM BROMIDE AND ALBUTEROL SULFATE 3 ML: .5; 3 SOLUTION RESPIRATORY (INHALATION) at 15:04

## 2019-08-06 RX ADMIN — SODIUM CHLORIDE, POTASSIUM CHLORIDE, SODIUM LACTATE AND CALCIUM CHLORIDE: 600; 310; 30; 20 INJECTION, SOLUTION INTRAVENOUS at 00:43

## 2019-08-06 ASSESSMENT — ACTIVITIES OF DAILY LIVING (ADL)
ADLS_ACUITY_SCORE: 35
ADLS_ACUITY_SCORE: 35
ADLS_ACUITY_SCORE: 22

## 2019-08-06 NOTE — PROGRESS NOTES
[facility]    Hospitalist Progress Note             Date of Admission:  8/5/2019                   Day of hospitalization: 0    Assessment and Plan:      Wm Belcher is a 64 year old male with MS, chronic cuevas previous admission for aspiration pneumonia requiring PEG tube feeds, who is non compliant with diet presents with fevers, chills, cough. Per his facility he is on a regular diet.      Fevers: Community acquired pneumonia vs aspiration event? Also has pyuria, thus UTI a possibility  - continue IV ceftriaxone, azithromycin, and Flagyl added, for anaerobic coverage  - cultures pending    Acute hypoxic respiratory failure?  - unsure, now resolved, possibly a aspiration event? Per chart patient has history of non compliance with diet  - will order speech path, monitor  - continue IV ceftriaxone and azithromycin    Possible UTI, catheter associated  - as above    History of aspiration pneumonia was on PEG tube, patient still complains of intermittent dysphasia now is on regular diet   - speech path evaluation    Multiple Sclerosis  -Continue dimethyl fumarate  - baclofen, tramadol, glycopyrrolate, modafinil    History of CAD and stent placement in 2015  - on metoprolol      Depression  Continue Celexa    Patient developed worsening respiratory failure, with poor cough reflex. He seems to have had a aspiration event, despite being on speech's recommendations. He will have video swallow tomorrow. Patient also wants to reverse his code to FULL. A repeat chest xray is ordered. Changed ceftriaxone to IV cefepime and changed oral flagyl to IV. Repeat blood cultures, and repeat lactic acid ordered, gave 500 ml fluids.  ---------     # Code status: Full  # Anticipated discharge date and Disposition: in 1 day pending cultures/clinical improvement  # DVT: Lovenox daily  # IVF: Lactated ringers at 75 ml/Hour                       Sophia Cochran MD  Text Page (7am - 6pm, M-F)               Subjective    Chief Complaint: Fever  Subjective: Patient wants to go home, feels better, has cough, denies dysuria. Otherwise, no chest pain, SOB, or abdominal pain. No diarrhea. I discussed about his diet, he says he eats regular diet, does have occasional dysphagia. He had fevers in evening         Objective   BP 98/63 (BP Location: Left arm)   Pulse 137   Temp 96.9  F (36.1  C) (Oral)   Resp 24   Wt 70.3 kg (155 lb)   SpO2 94%      Physical Exam  General: Pt in NAD, normal appearance  HEENT: OP clear MMM, no JVD  Lungs: bibasilar crackles, normal breathing  without accessory muscle usage, no wheezing, rhonchi or crackles  Cardiac: +S1, S2, RRR, no MRG, no edema  Abdominal: normal bowel sounds, NT/ND,   Skin: warm, dry, normal turgor, no rash  Psyche: A& O x3, appropriate affect       Intake/Output Summary (Last 24 hours) at 8/6/2019 1233  Last data filed at 8/6/2019 0608  Gross per 24 hour   Intake 0 ml   Output 275 ml   Net -275 ml           Labs and Imaging Results:      Recent Labs   Lab 08/06/19  0705 08/05/19  1658   WBC 9.8 9.7   HGB 12.4* 13.3    235        Recent Labs   Lab 08/06/19  0705 08/05/19  1658    138   CO2 26 28   BUN 14 17      No results for input(s): INR, PTT in the last 168 hours.   No results for input(s): CKMB in the last 168 hours.    Invalid input(s): TROPONINT     Recent Labs   Lab 08/05/19  1658   ALBUMIN 3.3*   AST 32   ALT 47   ALKPHOS 117        Micro:     Radio:  CT Chest Pulmonary Embolism w Contrast   Final Result   CONCLUSION:   1.  Negative for pulmonary embolism.   2.  Bilateral areas of pleural thickening and bibasilar areas of subsegmental atelectasis.   3.  Calcified splenic granuloma and parenchymal lung calcified granuloma consistent with previous granulomatous infection.      Chest XR,  PA & LAT   Final Result              Medications:      Scheduled Meds:      azithromycin  500 mg Intravenous Q24H     baclofen  30 mg Oral TID     cefTRIAXone  1 g Intravenous Q24H      metoprolol tartrate  12.5 mg Oral QAM     sodium chloride (PF)  3 mL Intracatheter Q8H     Continuous Infusions:      lactated ringers 100 mL/hr at 08/06/19 1206     PRN Meds:  acetaminophen, lidocaine 4%, lidocaine (buffered or not buffered), melatonin, metoprolol, naloxone, ondansetron **OR** ondansetron, prochlorperazine **OR** prochlorperazine **OR** prochlorperazine, sodium chloride (PF)

## 2019-08-06 NOTE — PLAN OF CARE
Tmax 102.5, down to 98.9 with no interventions.  HR tachy in the 130's on admission, 2.5mg Iv lopressor x1 and down to 100's this am.  Weaned to room air with sats in the low 90's.  Initially lethargic and flaccid BUE, then able to respond to yes or no questions, now conversing and able to move BUE.  Mepilex placed on coccyx wound, wocn consulted. LR infusing at 100mL/hr.  On Iv rocephin and zithromax.  275mL emptied from suprapubic catheter.  Turned and repositioned q2h.  Sw consulted.

## 2019-08-06 NOTE — PHARMACY-ADMISSION MEDICATION HISTORY
Admission medication history interview status for this patient is complete. See [facility] admission navigator for allergy information, prior to admission medications and immunization status.     Medication history interview source(s):none  Medication history resources (including written lists, pill bottles, clinic record):[facility]--[facility]    Changes made to PTA medication list:  Added: all  Deleted: none  Changed: none    Actions taken by pharmacist (provider contacted, etc):None     Additional medication history information:None    Medication reconciliation/reorder completed by provider prior to medication history? No    For patients on insulin therapy: no (Yes/No)   Lantus/levemir/NPH/Mix 70/30 dose: ___ in AM/PM or twice daily   Sliding scale Novolog Y/N   If Yes, do you have a baseline novolog pre-meal dose: ______units with meals   Patients eat three meals a day: Y/N ---  How many episodes of hypoglycemia (low blood glucose) do you have weekly: ---   How many missed doses do you have a week: ---  How many times do you check your blood glucose per day: ---  Any Barriers to therapy: cost of medications/comfortable with giving injections (if applicable)/ comfortable and confident with current diabetes regimen ---      Prior to Admission medications    Medication Sig Last Dose Taking? Auth Provider   acetaminophen (TYLENOL) 325 MG tablet Take 650 mg by mouth 3 times daily 8/5/2019 at x2 Yes Unknown, Entered By History   acetaminophen (TYLENOL) 325 MG tablet Take 650 mg by mouth every 6 hours as needed for mild pain  Yes Unknown, Entered By History   albuterol (PROVENTIL) (2.5 MG/3ML) 0.083% neb solution Take 2.5 mg by nebulization every 4 hours as needed for shortness of breath / dyspnea or wheezing 8/5/2019 at 1214 Yes Unknown, Entered By History   baclofen (LIORESAL) 10 MG tablet Take 30 mg by mouth 3 times daily 8/5/2019 at x2 Yes Unknown, Entered By History   cefdinir (OMNICEF) 300 MG capsule Take 1  capsule (300 mg) by mouth 2 times daily for 10 days  Yes Dalton Ford PA-C   citalopram (CELEXA) 10 MG tablet Take 30 mg by mouth daily 8/5/2019 at am Yes Unknown, Entered By History   Citric Qk-Cierpxvrfli-Jg Carb (RENACIDIN) SOLN Irrigate with 30 mLs as directed twice a week Mon and Thur 8/5/2019 at 0500 Yes Unknown, Entered By History   diclofenac (VOLTAREN) 1 % topical gel Place 2 g onto the skin 3 times daily 8/5/2019 at x2 Yes Unknown, Entered By History   dimethyl fumarate 240 MG CPDR Take 240 mg by mouth 2 times daily 8/5/2019 at am Yes Unknown, Entered By History   doxycycline hyclate (VIBRA-TABS) 100 MG tablet Take 100 mg by mouth 2 times daily For 5 days (started 8-5-29) 8/5/2019 at am Yes Unknown, Entered By History   glycopyrrolate (ROBINUL) 1 MG tablet Take 1 mg by mouth 3 times daily 8/5/2019 at am Yes Unknown, Entered By History   guaiFENesin (MUCINEX) 600 MG 12 hr tablet Take 1,200 mg by mouth 2 times daily 8/5/2019 at am Yes Unknown, Entered By History   methenamine mandelate 1 g TABS tablet Take 1,000 mg by mouth 2 times daily 8/5/2019 at am Yes Unknown, Entered By History   metoprolol tartrate (LOPRESSOR) 12.5 mg TABS half-tab Take 12.5 mg by mouth every morning 8/5/2019 at am Yes Unknown, Entered By History   mirabegron (MYRBETRIQ) 25 MG 24 hr tablet Take 25 mg by mouth daily 8/5/2019 at am Yes Unknown, Entered By History   modafinil (PROVIGIL) 100 MG tablet Take 100 mg by mouth daily 8/5/2019 at am Yes Unknown, Entered By History   polyethylene glycol (MIRALAX/GLYCOLAX) packet Take 1 packet by mouth daily 8/5/2019 at am Yes Unknown, Entered By History   senna-docusate (SENOKOT-S/PERICOLACE) 8.6-50 MG tablet Take 1 tablet by mouth 2 times daily 8/5/2019 at am Yes Unknown, Entered By History   traMADol (ULTRAM) 50 MG tablet Take 100 mg by mouth 3 times daily 8/5/2019 at x2 Yes Unknown, Entered By History   vitamin D3 (CHOLECALCIFEROL) 1000 units (25 mcg) tablet Take 5,000 Units by  mouth daily 8/5/2019 at am Yes Unknown, Entered By History

## 2019-08-06 NOTE — PLAN OF CARE
"Discharge Planner SLP   Patient plan for discharge: Did not state    Current status: SLP: Per MD note, hx of dysphagia with PEG tube. Also noted, \"history of non compliance with diet.\" SLP notes and/or previous Video Swallows not available in chart review. Pt reported only having occasional difficulty with steak/meats. RN reported near choking episode with oatmeal today with ineffective cough. Oral motor exam noted delayed movements, reduced ROM, and generalized weakness. Gurgled/wet vocal quality with cough not effective in clearing. Pt quite impulsive with cup sips of water with suspected delayed penetration/residue. Improved performance with thin liquids by teaspoon with no overt s/sx of aspiration. Oral deficits and delayed initiation with puree. Severely reduced oral manipulation with regular solids and suspected pharyngeal retention with solids. Reviewed SLP options. Pt does NOT want a feeding tube and would like to continue eating/drinking. Pt did verbally consent to Video Swallow Study. Based on pt's wishes: continue PO with dysphagia diet level 1 and thin liquids by teaspoon with 1:1 assist to verify every swallow and alternate solids and liquids. SLP POC pending VFSS on 8/7.     Barriers to return to prior living situation: dysphagia  Recommendations for discharge: Pending VFSS  Rationale for recommendations: Pending VFSS       Entered by: Le Regan 08/06/2019 2:18 PM      "

## 2019-08-06 NOTE — ED NOTES
[facility]  ED Nurse Handoff Report    Wm Belcher is a 64 year old male   ED Chief complaint: Fever  . ED Diagnosis:   Final diagnoses:   Catheter-associated urinary tract infection (H)   Altered mental status, unspecified altered mental status type   Sepsis, due to unspecified organism (H)   Hypoxia     Allergies:   Allergies   Allergen Reactions     Oxycodone Visual Disturbance       Code Status: DNR/DNI  Activity level - Baseline/Home:  Total Care. Activity Level - Current:   Total Care. Lift room needed: No. Bariatric: No   Needed: No   Isolation: No. Infection: Not Applicable.     Vital Signs:   Vitals:    08/05/19 2248 08/05/19 2250 08/05/19 2315 08/05/19 2345   BP:   (!) 141/85    Pulse:   137    Resp:   26 26   Temp: 102.2  F (39  C)      TempSrc: Tympanic      SpO2:  92% 95% 98%   Weight:           Cardiac Rhythm:  ,   Cardiac  Cardiac Rhythm: Normal sinus rhythm;Sinus tachycardia  Pain level:    Patient confused: Yes. Patient Falls Risk: Yes.   Elimination Status: Suprapubic   Patient Report - Initial Complaint:Pt arrives via EMS from nursing home d/t 1-2 days fever. Pt sating 89-90% on RA. Hx MS. Denies pain. Hx frequent UTIs. ABC intact.  .   Focused Assessment:    18:49 Cardiac Cardiac Monitoring - EKG Monitoring: Yes  Cardiac Regularity: Regular  Cardiac Rhythm: NSR; ST  AV      18:49 Genitourinary Genitourinary - Genitourinary WDL: -WDL except; voiding ability/characteristics; all (smells of urine/UTI ) Urine Characteristics: odorous; brown  AV     18:48 Respiratory Respiratory - Respiratory WDL: cough  Breath Sounds: All Fields  Cough Frequency: frequent  Cough Type: congested (wet sounding )  Head To Toe Assessment - All Lung Fields Breath Sounds: crackles, fine; diminished          Tests Performed: Labs, Xray, CT. Abnormal Results:   Labs Ordered and Resulted from Time of ED Arrival Up to the Time of Departure from the ED   ROUTINE UA WITH MICROSCOPIC - Abnormal;  Notable for the following components:       Result Value    Blood Urine Moderate (*)     Protein Albumin Urine 30 (*)     Leukocyte Esterase Urine Large (*)     WBC Urine 78 (*)     RBC Urine 76 (*)     WBC Clumps Present (*)     Bacteria Urine Few (*)     Mucous Urine Present (*)     All other components within normal limits   CBC WITH PLATELETS DIFFERENTIAL - Abnormal; Notable for the following components:    MCHC 31.1 (*)     Absolute Neutrophil 8.4 (*)     Absolute Lymphocytes 0.5 (*)     All other components within normal limits   COMPREHENSIVE METABOLIC PANEL - Abnormal; Notable for the following components:    Glucose 124 (*)     Albumin 3.3 (*)     All other components within normal limits   LACTIC ACID WHOLE BLOOD - Abnormal; Notable for the following components:    Lactic Acid 2.6 (*)     All other components within normal limits   ISTAT  GASES LACTATE ESTEBAN POCT - Abnormal; Notable for the following components:    PO2 Venous 20 (*)     Lactic Acid 2.7 (*)     All other components within normal limits   LACTIC ACID WHOLE BLOOD   NT PROBNP INPATIENT   TROPONIN I   PROCALCITONIN   BLOOD GAS VENOUS AND OXYHGB   PULSE OXIMETRY NURSING   CARDIAC CONTINUOUS MONITORING   MEASURE URINE OUTPUT   PATIENT CARE ORDER   ISTAT CG4 GASES LACTATE ESTEBAN NURSING POCT   URINE CULTURE AEROBIC BACTERIAL   BLOOD CULTURE   BLOOD CULTURE     CT Chest Pulmonary Embolism w Contrast   Final Result   CONCLUSION:   1.  Negative for pulmonary embolism.   2.  Bilateral areas of pleural thickening and bibasilar areas of subsegmental atelectasis.   3.  Calcified splenic granuloma and parenchymal lung calcified granuloma consistent with previous granulomatous infection.      Chest XR,  PA & LAT   Final Result      .   Treatments provided: See MAR  Family Comments: None  OBS brochure/video discussed/provided to patient:  Yes  ED Medications:   Medications   lactated ringers BOLUS 1,000 mL (1,000 mLs Intravenous New Bag 8/5/19 9402)   0.9%  sodium chloride BOLUS (0 mLs Intravenous Stopped 8/5/19 1924)   iopamidol (ISOVUE-370) solution 500 mL (76 mLs Intravenous Given 8/5/19 1914)   0.9% sodium chloride BOLUS (0 mLs Intravenous Stopped 8/5/19 2115)   ipratropium - albuterol 0.5 mg/2.5 mg/3 mL (DUONEB) neb solution 3 mL (3 mLs Nebulization Given 8/5/19 2015)   cefTRIAXone (ROCEPHIN) 1 g vial to attach to  mL bag for ADULTS or NS 50 mL bag for PEDS (0 g Intravenous Stopped 8/5/19 2105)   acetaminophen (TYLENOL) Suppository 650 mg (650 mg Rectal Given 8/5/19 2314)     Drips infusing:  Yes  For the majority of the shift, the patient's behavior Green. Interventions performed were N/A.     Severe Sepsis OR Septic Shock Diagnosis Present: No      ED Nurse Name/Phone Number: Ayad Marcus,   11:23 PM  RECEIVING UNIT ED HANDOFF REVIEW    Above ED Nurse Handoff Report was reviewed: Yes  Reviewed by: Aissatou Joshi on August 5, 2019 at 11:56 PM

## 2019-08-06 NOTE — PROGRESS NOTES
"[facility]     Date: 08/06/19    Admission Dx: Fevres    Pulmonary History: None per H and P    Home Nebulizer/MDI Use: None    Home Oxygen: None    Acuity Level (RCAT flow sheet): Acuity level two    Aerosol Therapy initiated: Duoneb Q4 and Alb Q4 prn    Pulmonary Hygiene initiated: NT suctioning as needed    Volume Expansion initiated: IS QID     Current Oxygen Requirements: 2 LPM    Current SpO2: 94%    Re-evaluation date: 08/09/19    Patient Education: Discussed with patient benefits of nebulizer and suctioning.    See \"RT Assessments\" flow sheet for patient assessment scoring and Acuity Level Details.     Aissatou Haynes, RT  8/6/2019 4:48 PM              "

## 2019-08-06 NOTE — PROVIDER NOTIFICATION
Gave MD verbal update about positive blood culture gram neg rods. Also notified of EKG result.      Called and notified ex-wife about patient's status.  Changed to full code.

## 2019-08-06 NOTE — H&P
[facility]    History and Physical - Hospitalist Service       Date of Admission:  8/5/2019    Assessment & Plan   Wm Belcher is a 64 year old male admitted on 8/5/2019. He presents with fever.      Fever    Urinary tract infection    Severe Sepsis    Assessment: 64-year-old male with MS, indwelling Marie catheter who presents for evaluation of fevers. Is never reported as initial work-up was pertinent for lactic acid of 2.7 UA is positive for bacteriuria, 70 WBCs, moderate blood, large leukocyte esterase, consistent with a UTI, though there is possibility that he is just colonized.  Febrile on admission 102.2F.  Given tachycardia, lactic acidosis, fevers, with source of infection, patient meets severe sepsis criteria.    Plan:   -Admit inpatient  -IV ceftriaxone  -Follow vital/temp  -follow urine cultures and blood cultures  - IV fluids  -Patient is DNR/DNI      Acute hypoxic respiratory Failure     Assessment: Worsening hypoxia of unclear etiology, his CT PE study was negative for pulmonary embolism, though there is subsegmental atelectasis that could also be possible pneumonia.  And in setting of fever and reported cough, will treat for community-acquired pneumonia at this time.  He has no wheezing on exam, thus doubt this is a COPD exacerbation. his POLST reports that he is okay with BiPAP, otherwise no ICU.  His proBNP is within normal limits, and there is no evidence of hypervolemia to suggest CHF.    Plan:  - Obtain VBG  - Ceftriaxone/Azithromycin  - Supplemental O2 as needed  - Oximetry  - PFTs as outpatient  - Duonebs QID and as needed  - Steroids?      ASCVD (arteriosclerotic cardiovascular disease)    Assessment: no angina symptoms, PTA on BB    Plan:   - Continue BB in AM if able to tolerate PO meds  - otherwise IV BB while NPO      Depression    Assessment/Plan: continue PTA Celexa once verified      MS (multiple sclerosis) (H)    Overactive Bladder    Assessment/Plan: stable,  continue PTA Baclofen/Myrbetriq once verified and patient more awake       Diet: Regular Diet  DVT Prophylaxis: Pneumatic Compression Devices  Marie Catheter: not present  Code Status: FULL CODE    Disposition Plan   Expected discharge: 2 - 3 days, recommended to prior living arrangement once antibiotic plan established.  Entered: Joe Culver MD 08/05/2019, 11:31 PM     The patient's care was discussed with the Patient and ED Provider.    Joe Culver MD  [facility]    ______________________________________________________________________    Chief Complaint     Fever    History is obtained from the patient    History of Present Illness      Wm Belcher is a 64 year old male with PMH of multiple sclerosis, coronary artery disease who presents for evaluation of fever.    HPI is limited secondary to the patient's altered mental status.    Patient was brought in by EMS from his assisted living facility due to concerns of low-grade fevers over the past 2 to 3 days.  Additionally staff noticed that his oxygen saturation has been below his baseline of 90%.  Otherwise there was no report of any significant chest pain, no cough, no concerns of aspiration per report.  He has an indwelling suprapubic catheter.  He has a history of recurrent UTIs.  Patient is a former smoker, there has been documented report of COPD in the past, but he is not on any inhalers/nebulizers for this.  No recent reports of any vomiting, diarrhea, abdominal pain.  No recent weight loss or night sweats.  Patient himself denies any chest pain, he denies any neck stiffness or headaches at this time.  He has no specific complaints at this time..    Review of Systems    The 10 point Review of Systems is negative other than noted in the HPI or here.     Past Medical History    I have reviewed this patient's medical history and updated it with pertinent information if needed.   Past Medical History:   Diagnosis Date     CAD (coronary  artery disease)        Past Surgical History   I have reviewed this patient's surgical history and updated it with pertinent information if needed.  History reviewed. No pertinent surgical history.    Social History   I have reviewed this patient's social history and updated it with pertinent information if needed.  Social History     Tobacco Use     Smoking status: Former Smoker     Smokeless tobacco: Never Used   Substance Use Topics     Alcohol use: Not Currently     Drug use: Never       Family History   I have reviewed this patient's family history and updated it with pertinent information if needed.   Family History   Problem Relation Age of Onset     No Known Problems Mother      No Known Problems Father      Prior to Admission Medications   Prior to Admission Medications   Prescriptions Last Dose Informant Patient Reported? Taking?   Citric Ax-Kjidillwzcz-Lw Carb (RENACIDIN) SOLN 8/5/2019 at 0500  Yes Yes   Sig: Irrigate with 30 mLs as directed twice a week Mon and Thur   acetaminophen (TYLENOL) 325 MG tablet 8/5/2019 at x2  Yes Yes   Sig: Take 650 mg by mouth 3 times daily   acetaminophen (TYLENOL) 325 MG tablet   Yes Yes   Sig: Take 650 mg by mouth every 6 hours as needed for mild pain   albuterol (PROVENTIL) (2.5 MG/3ML) 0.083% neb solution 8/5/2019 at 1214  Yes Yes   Sig: Take 2.5 mg by nebulization every 4 hours as needed for shortness of breath / dyspnea or wheezing   baclofen (LIORESAL) 10 MG tablet 8/5/2019 at x2  Yes Yes   Sig: Take 30 mg by mouth 3 times daily   citalopram (CELEXA) 10 MG tablet 8/5/2019 at am  Yes Yes   Sig: Take 30 mg by mouth daily   diclofenac (VOLTAREN) 1 % topical gel 8/5/2019 at x2  Yes Yes   Sig: Place 2 g onto the skin 3 times daily   dimethyl fumarate 240 MG CPDR 8/5/2019 at am  Yes Yes   Sig: Take 240 mg by mouth 2 times daily   doxycycline hyclate (VIBRA-TABS) 100 MG tablet 8/5/2019 at am  Yes Yes   Sig: Take 100 mg by mouth 2 times daily For 5 days (started 8-5-29)    glycopyrrolate (ROBINUL) 1 MG tablet 8/5/2019 at am  Yes Yes   Sig: Take 1 mg by mouth 3 times daily   guaiFENesin (MUCINEX) 600 MG 12 hr tablet 8/5/2019 at am  Yes Yes   Sig: Take 1,200 mg by mouth 2 times daily   methenamine mandelate 1 g TABS tablet 8/5/2019 at am  Yes Yes   Sig: Take 1,000 mg by mouth 2 times daily   metoprolol tartrate (LOPRESSOR) 12.5 mg TABS half-tab 8/5/2019 at am  Yes Yes   Sig: Take 12.5 mg by mouth every morning   mirabegron (MYRBETRIQ) 25 MG 24 hr tablet 8/5/2019 at am  Yes Yes   Sig: Take 25 mg by mouth daily   modafinil (PROVIGIL) 100 MG tablet 8/5/2019 at am  Yes Yes   Sig: Take 100 mg by mouth daily   polyethylene glycol (MIRALAX/GLYCOLAX) packet 8/5/2019 at am  Yes Yes   Sig: Take 1 packet by mouth daily   senna-docusate (SENOKOT-S/PERICOLACE) 8.6-50 MG tablet 8/5/2019 at am  Yes Yes   Sig: Take 1 tablet by mouth 2 times daily   traMADol (ULTRAM) 50 MG tablet 8/5/2019 at x2  Yes Yes   Sig: Take 100 mg by mouth 3 times daily   vitamin D3 (CHOLECALCIFEROL) 1000 units (25 mcg) tablet 8/5/2019 at am  Yes Yes   Sig: Take 5,000 Units by mouth daily      Facility-Administered Medications: None     Allergies   Allergies   Allergen Reactions     Oxycodone Visual Disturbance       Physical Exam   Vital Signs: Temp: 102.2  F (39  C) Temp src: Tympanic BP: (!) 141/85 Pulse: 137 Heart Rate: 138 Resp: 26 SpO2: 95 % O2 Device: Oxymask Oxygen Delivery: 10 LPM  Weight: 190 lbs 0 oz    Constitutional: Somnolent, awakens briefly answers questions in 1-2 word answers, otherwise no acute distress.  Eyes: Lids and lashes normal, pupils equal, round and reactive to light, extra ocular muscles intact, sclera clear, conjunctiva normal  ENT: Normocephalic, without obvious abnormality, atraumatic, sinuses nontender on palpation  Hematologic / Lymphatic: no cervical lymphadenopathy  Respiratory: Diminished at bases with trace crackles, otherwise no wheezing, there is no accessory muscle use.  Cardiovascular:  Tachycardic, otherwise regular rhythm with no murmurs rubs or gallops  GI: Abdomen is soft, nondistended, nontender, bowel sounds present  Skin: normal skin color, texture, turgor  Musculoskeletal: There is no redness, warmth, or swelling of the joints.  Full range of motion noted.  Motor strength is 5 out of 5 all extremities bilaterally.  Tone is normal.  Neurologic: Somnolent, neuro exam is limited secondary to patient not being cooperative  Neuropsychiatric: normal mood and affect    Data   Data reviewed today: I reviewed all medications, new labs and imaging results over the last 24 hours. I personally reviewed the EKG tracing showing Sinus Tachycardia, the chest x-ray image(s) showing see below and the chest CT image(s) showing see below.    CT CHEST PE  CONCLUSION:  1.  Negative for pulmonary embolism.  2.  Bilateral areas of pleural thickening and bibasilar areas of subsegmental atelectasis.  3.  Calcified splenic granuloma and parenchymal lung calcified granuloma consistent with previous granulomatous infection.    Most Recent 3 CBC's:  Recent Labs   Lab Test 08/05/19  1658   WBC 9.7   HGB 13.3   MCV 94        Most Recent 3 BMP's:  Recent Labs   Lab Test 08/05/19  1658      POTASSIUM 4.0   CHLORIDE 104   CO2 28   BUN 17   CR 0.73   ANIONGAP 6   KARTHIKEYAN 9.2   *     Most Recent 2 LFT's:  Recent Labs   Lab Test 08/05/19  1658   AST 32   ALT 47   ALKPHOS 117   BILITOTAL 0.7     Most Recent 3 INR's:No lab results found.  Most Recent 3 Troponin's:  Recent Labs   Lab Test 08/05/19  1658   TROPI <0.015     Most Recent 3 BNP's:  Recent Labs   Lab Test 08/05/19  1658   NTBNPI 198     Most Recent D-dimer:No lab results found.  Most Recent Urinalysis:  Recent Labs   Lab Test 08/1955   COLOR Yellow   APPEARANCE Clear   URINEGLC Negative   URINEBILI Negative   URINEKETONE Negative   SG 1.010   UBLD Moderate*   URINEPH 6.5   PROTEIN 30*   NITRITE Negative   LEUKEST Large*   RBCU 76*   WBCU 78*      Recent Results (from the past 24 hour(s))   Chest XR,  PA & LAT    Narrative    EXAM: XR CHEST 2 VW  LOCATION: [facility]  DATE/TIME: 8/5/2019 5:54 PM    INDICATION: Cough.  COMPARISON: None.    FINDINGS: Atelectasis and volume loss in the right lower lobe with a platelike area of dense consolidation. This could represent a postobstructive pneumonitis. CT would be helpful to further characterize. Heart size normal.    NOTE: ABNORMAL REPORT    THE DICTATION ABOVE DESCRIBES AN ABNORMALITY FOR WHICH FOLLOWUP IS NEEDED.    CT Chest Pulmonary Embolism w Contrast    Narrative    EXAM: CT CHEST PULMONARY EMBOLISM W CONTRAST  LOCATION: [facility]  DATE/TIME: 8/5/2019 7:12 PM    INDICATION: Chest pain, shortness of breath.  COMPARISON: None  TECHNIQUE: Helical acquisition through the chest was performed during the arterial phase of contrast enhancement using IV contrast. 2D and 3D reconstructions were performed by the CT technologist. Dose reduction techniques were used.   IV CONTRAST: 76mL Isovue-370    FINDINGS:  ANGIOGRAM CHEST: Pulmonary arteries are normal caliber and negative for pulmonary emboli. Normal caliber thoracic aorta with no dissection or aneurysm.     LUNGS AND PLEURA: No pneumothorax. Lateral lower lobe pleural thickening. Linear areas of subsegmental atelectasis in both lung bases. Calcified granuloma left base.    MEDIASTINUM: Coronary artery calcifications. No mediastinal adenopathy.    LIMITED UPPER ABDOMEN: Postop cholecystectomy. Calcified splenic granuloma.    MUSCULOSKELETAL: Negative.      Impression    CONCLUSION:  1.  Negative for pulmonary embolism.  2.  Bilateral areas of pleural thickening and bibasilar areas of subsegmental atelectasis.  3.  Calcified splenic granuloma and parenchymal lung calcified granuloma consistent with previous granulomatous infection.

## 2019-08-06 NOTE — PROGRESS NOTES
[facility]  Hospitalist Cross Cover Note  Name: Wm Belcher    MRN: [id number]  Physician:  Khadar Lauren DO, FHM (Text Page)    Summary of Stay: Wm Belcher is a 64 year old male with MS who was admitted on 8/5/2019.  He has had fevers and concern of UTI.  His blood cultures recently turned positive and he has spiked a high fever.  I was notified as the evening covering physician and immediately came and assessed him.    Assessment & Plan    Sepsis  Bacteremia, appears proteus  Probable UTI as source:  -  Bolus another liter IVF now  -  Check updated labs  -  Move to ICU  -  IV access poor, I have requested a PICC line  -  Updated son, all his questions were answered  -  Also discussed with ID.  I also have a call out to tele ICU intensivist    MS Kyle bell, choked on intake earlier  Metabolic encephalopathy:  -  NPO for now, hold oral meds  -  Tylenol rectally for fever + ice packing  -  Reassess swallow tomorrow with speech    Greater than 35 mins critical care time spent.    Physical Exam   Temp: 105  F (40.6  C) Temp src: Temporal BP: 131/61 Pulse: 137 Heart Rate: 135 Resp: 28 SpO2: 94 % O2 Device: Nasal cannula Oxygen Delivery: 2 LPM  Vitals:    08/05/19 1901 08/06/19 0049   Weight: 86.2 kg (190 lb) 70.3 kg (155 lb)     Vital Signs with Ranges  Temp:  [96.9  F (36.1  C)-105  F (40.6  C)] 105  F (40.6  C)  Pulse:  [110-137] 137  Heart Rate:  [] 135  Resp:  [15-28] 28  BP: ()/(60-98) 131/61  SpO2:  [89 %-99 %] 94 %  I/O last 3 completed shifts:  In: 0   Out: 275 [Urine:275]    GEN:  Alert, oriented x 2, appears ill and lethargic.  HEENT:  Normocephalic/atraumatic, no scleral icterus, no nasal discharge, mouth mildly dry.  CV:  Regular rate and rhythm, no murmur or JVD.  S1 + S2 noted, no S3 or S4.  LUNGS:  Clear upper, decreased breath sounds bases.  No wheezes/retractions  ABD:  Active bowel sounds, soft, non-tender/non-distended.  No rebound/guarding/rigidity.  EXT:   Trace LE edema.  No cyanosis.  No acute joint synovitis noted.  SKIN:  Dry to touch, no exanthems noted in the visualized areas.  NEURO:  Follows basic commands, answers a few questions but lethargic.    Medications     lactated ringers 75 mL/hr at 08/06/19 1248       sodium chloride 0.9%  1,000 mL Intravenous Once     baclofen  30 mg Oral TID     ceFEPIme (MAXIPIME) IV  2 g Intravenous Q8H     enoxaparin  40 mg Subcutaneous Q24H     ipratropium - albuterol 0.5 mg/2.5 mg/3 mL  3 mL Nebulization Q4H     metroNIDAZOLE  500 mg Intravenous Q6H     polyethylene glycol  17 g Oral Daily     senna-docusate  1 tablet Oral BID     sodium chloride (PF)  3 mL Intracatheter Q8H     Data     Recent Labs   Lab 08/06/19  0705 08/05/19  1658   WBC 9.8 9.7   HGB 12.4* 13.3   HCT 38.5* 42.8   MCV 93 94    235     Recent Labs   Lab 08/05/19  1955/19  1803 08/05/19  1657   CULT Culture in progress Cultured on the 1st day of incubation:  Gram negative rods  *  Critical Value/Significant Value, preliminary result only, called to and read back by  Morris Real RN [email or web address] @ 0529 8.6.19 JE    (Note)  POSITIVE for PROTEUS SPECIES by Verigene multiplex nucleic acid test.  Final identification and antimicrobial susceptibility testing will be  verified by standard methods.    Specimen tested with Verigene multiplex, gram-negative blood culture  nucleic acid test for the following targets: Acinetobacter sp.,  Citrobacter sp., Enterobacter sp., Proteus sp., E. coli, K.  pneumoniae/oxytoca, P. aeruginosa, and the following resistance  markers: CTXM, KPC, NDM, VIM, IMP and OXA.    Critical Value/Significant Value called to and read back by  Morris Real RN @ 1755. 8/5/19. AMV   No growth after 17 hours     Recent Labs   Lab 08/06/19  0705 08/05/19  1658    138   POTASSIUM 3.6 4.0   CHLORIDE 108 104   CO2 26 28   ANIONGAP 5 6   * 124*   BUN 14 17   CR 0.74 0.73   GFRESTIMATED >90 >90   GFRESTBLACK >90 >90   KARTHIKEYAN 8.4* 9.2    PROTTOTAL  --  7.9   ALBUMIN  --  3.3*   BILITOTAL  --  0.7   ALKPHOS  --  117   AST  --  32   ALT  --  47       Recent Results (from the past 24 hour(s))   CT Chest Pulmonary Embolism w Contrast    Narrative    EXAM: CT CHEST PULMONARY EMBOLISM W CONTRAST  LOCATION: [facility]  DATE/TIME: 8/5/2019 7:12 PM    INDICATION: Chest pain, shortness of breath.  COMPARISON: None  TECHNIQUE: Helical acquisition through the chest was performed during the arterial phase of contrast enhancement using IV contrast. 2D and 3D reconstructions were performed by the CT technologist. Dose reduction techniques were used.   IV CONTRAST: 76mL Isovue-370    FINDINGS:  ANGIOGRAM CHEST: Pulmonary arteries are normal caliber and negative for pulmonary emboli. Normal caliber thoracic aorta with no dissection or aneurysm.     LUNGS AND PLEURA: No pneumothorax. Lateral lower lobe pleural thickening. Linear areas of subsegmental atelectasis in both lung bases. Calcified granuloma left base.    MEDIASTINUM: Coronary artery calcifications. No mediastinal adenopathy.    LIMITED UPPER ABDOMEN: Postop cholecystectomy. Calcified splenic granuloma.    MUSCULOSKELETAL: Negative.      Impression    CONCLUSION:  1.  Negative for pulmonary embolism.  2.  Bilateral areas of pleural thickening and bibasilar areas of subsegmental atelectasis.  3.  Calcified splenic granuloma and parenchymal lung calcified granuloma consistent with previous granulomatous infection.   XR Chest Port 1 View    Narrative    CHEST PORTABLE ONE VIEW   8/6/2019 3:30 PM     HISTORY: Shortness of breath.    COMPARISON: 8/5/2019 chest x-ray.      Impression    IMPRESSION: Heart size is normal. Tortuous aorta. Pulmonary  vasculature is not distended. Mild left base atelectasis. Right lung  is clear. No evidence of pleural fluid. Atelectasis in the left base  is slightly more prominent than on the comparison study. Area of  atelectasis or fluid seen in the right lung base  has resolved.    HEATHER VILLALTA MD

## 2019-08-06 NOTE — PROGRESS NOTES
"   08/06/19 1423   General Information   Onset Date 08/06/19   Start of Care Date 08/06/19   Referring Physician Sophia Cochran MD   Patient Profile Review/OT: Additional Occupational Profile Info See Profile for full history and prior level of function   Patient/Family Goals Statement return home   Swallowing Evaluation Bedside swallow evaluation   Behaviorial Observations Alert  (slow responses, but appropriate)   Mode of current nutrition NPO   Respiratory Status O2 Supply   Type of O2 supply Nasal cannula   Comments Pt admitted with recurrent UTI and pnx. Hx of dysphagia, however, no records available.    Clinical Swallow Evaluation   Oral Musculature anomalies present   Secretion Management problems swallowing secretions   Mucosal Quality dry;sticky   Mandibular Strength and Mobility impaired   Oral Labial Strength and Mobility impaired pursing;impaired retraction;impaired seal;impaired coordination   Lingual Strength and Mobility impaired protrusion;impaired anterior elevation;impaired left lateral movement;impaired right lateral movement   Buccal Strength and Mobility impaired   Laryngeal Function Cough;Swallow;Voicing initiated   Swallow Eval: Clinical Impressions   Skilled Criteria for Therapy Intervention Skilled criteria met.  Treatment indicated.   Functional Assessment Scale (FAS) 2   Treatment Diagnosis moderate to severe dysphagia   Diet texture recommendations Dysphagia diet level 1;Thin liquids   Therapy Frequency 5x/week   Predicted Duration of Therapy Intervention (days/wks) 1 week   Anticipated Discharge Disposition extended care facility   Risks and Benefits of Treatment have been explained. Yes   Patient, family and/or staff in agreement with Plan of Care Yes   Clinical Impression Comments SLP: Per MD note, hx of dysphagia with PEG tube. Also noted, \"history of non compliance with diet.\" SLP notes and/or previous Video Swallows not available in chart review. Pt reported only having " occasional difficulty with steak/meats. RN reported near choking episode with oatmeal today with ineffective cough. Oral motor exam noted delayed movements, reduced ROM, and generalized weakness. Gurgled/wet vocal quality with cough not effective in clearing. Pt quite impulsive with cup sips of water with suspected delayed penetration/residue. Improved performance with thin liquids by teaspoon with no overt s/sx of aspiration. Oral deficits and delayed initiation with puree. Severely reduced oral manipulation with regular solids and suspected pharyngeal retention with solids. Reviewed SLP options. Pt does NOT want a feeding tube and would like to continue eating/drinking. Pt did verbally consent to Video Swallow Study. Based on pt's wishes: continue PO with dysphagia diet level 1 and thin liquids by teaspoon with 1:1 assist to verify every swallow and alternate solids and liquids. SLP POC pending VFSS on 8/7.    Total Evaluation Time   Total Evaluation Time (Minutes) 16

## 2019-08-06 NOTE — CONSULTS
Care Transition Initial Assessment - SW     Met with: Patient, Family and RN staff from LTC unit    Principal Problem:    Urinary tract infection  Active Problems:    ASCVD (arteriosclerotic cardiovascular disease)    Depression    MS (multiple sclerosis) (H)       DATA  Lives With: facility resident - pt lives at [facility], [place], operated by [facility]    Quality of Family Relationships: involved  Description of Support System: Supportive, Involved  Who is your support system?: Facility resident(s)/Staff  Support Assessment: Adequate family and caregiver support, Adequate social supports.   Identified issues/concerns regarding health management: Admitted for UTI  Has H.o MS.    @   Resources List: Skilled Nursing Facility  PT is confirmed bed hold at [facility]     Quality of Family Relationships: involved  Transportation Anticipated: agency    ASSESSMENT  Cognitive Status:  awake, alert and oriented.   Concerns to be addressed: PT lives in LTC, is W/C bound due to his MS has bed hold, He is still decision maker. His Ex wife Babs is still involved and aware pt is here.       PLAN  Spoke with facility and requested HCD/POLS  Be faxed to [facility] for pt's file  Pt will need WC transport for his return to LTC

## 2019-08-06 NOTE — PROGRESS NOTES
Notified Provider of critical lab     08/06/19 7073   Significant Event   Significant Event Other (see comments)  (Critical lactic 3.1)

## 2019-08-06 NOTE — PROVIDER NOTIFICATION
Paged admitting pager: Temp 104.1 axilaary and 105 temporal, received tylenol at 1630.  Continues to be lethargic, RR 28

## 2019-08-06 NOTE — PROGRESS NOTES
"[facility] Nurse Inpatient Pressure Injury Assessment   Reason for consultation: Evaluate and treat Coccyx      ASSESSMENT  Pressure Injury: found to Bilateral Buttocks , present on admission ,   Pressure Injury is Stage Unstageable, vs healing Stage 3 or 4; dark red scabs and suspect improved status from deeper injury   Contributing factor of the pressure injury: pressure, microclimate, immobility and moisture  Status: initial assessment, evolving and stable  Recommend provider order: na     TREATMENT PLAN  Bilateral Buttocks wound: Q 3 days  1. Cleanse with wound spray/ NS and pat dry  2. Swab entire area with NO Sting barrier  3. Apply Mepilex dressing curve side DOWN, date  4. Swab outside with No sting to improve adherence  5. Pressure prevention: Pressure Injury Prevention (PIP) MEASURES:  1. If pt is refusing to turn or reposition they must be educated on the potential of injury due to not off loading.  Then this \"educated refusal\" needs to be documented as an \"educated refusal to turn/ reposition\" and document if alert, etc. Additionally, you MUST notify the charge nurse, nurse manager and the provider of the pt's refusal to reposition.  2. Follow Gamaliel Risk recommendations  ? Nutrition following  ? Moisture and Incontinence issues addressed, Sween/ Matthew to intact skin   3. CHAIR POSITIONING:  REPOSITIONING   Fully off load every 1-2 hours (stand x 5 minutes or back to bed)   Shift side to side every 15 minutes  Chair cushion ([id number]) when up to chair (pillow does not actually off load)  4.  BED:    POSTIONING  ? No direct supine positioning, position only side to side  ? Try to keep HOB below 30 degrees  ? Reposition every 1-2 hours  ? Keep heels elevated- one pillow under each leg from knee to heels, checking heels are free  ? Pulsate specialty mattress-     Orders Written  [facility] Nurse follow-up plan:weekly  Nursing to notify the Provider(s) and re-consult the [facility] Nurse if wound(s) deteriorates or new skin " concern.    Patient History  According to provider note(s):  64-year-old male with MS, indwelling Marie catheter who presents for evaluation of fevers. Is never reported as initial work-up was pertinent for lactic acid of 2.7 UA is positive for bacteriuria, 70 WBCs, moderate blood, large leukocyte esterase, consistent with a UTI, though there is possibility that he is just colonized.  Febrile on admission 102.2F.  Given tachycardia, lactic acidosis, fevers, with source of infection, patient meets severe sepsis criteria.    Objective Data  Containment of urine/stool: Suprapublic catheter    Current Diet/ Nutrition:  Orders Placed This Encounter      NPO for Medical/Clinical Reasons Except for: Ice Chips, Meds      Output:   I/O last 3 completed shifts:  In: 0   Out: 275 [Urine:275]    Risk Assessment:   Sensory Perception: 2-->very limited  Moisture: 3-->occasionally moist  Activity: 1-->bedfast  Mobility: 2-->very limited  Nutrition: 3-->adequate  Friction and Shear: 2-->potential problem  Gamaliel Score: 13      Labs:   Recent Labs   Lab 08/06/19  0705 08/05/19  1658   ALBUMIN  --  3.3*   HGB 12.4* 13.3   WBC 9.8 9.7       Physical Exam  Skin inspection: Buttocks and overall condition  Patient is high risk for pressure injury development secondary to MS    Wound Location:  Bilateral Buttocks  Date of last Photo   Wound History: see above;   Measurements (length x width x depth, in cm)   Right Buttocks: 4 x 1cm  Left Buttocks: 3 cm x 2 cm     Wound Base:  100 % dry red scabs, some areas lifting at edges  Periwound skin: dry/scaly and erythema- blanchable  Drainage:, scant  Description of drainage: serosanguinous  Odor: none  Pain: denies ,     Interventions  Current support surface: Standard  will order a Pulsate   Current off-loading measures: Foam padding and Pillows under calves and will order Chair cushion  Repositioning aid: Pillows  Visual inspection of wound(s) completed   Tube Securement: na  Wound Care: was  done per plan of care.  Supplies: at bedside, floor stock and discussed with RN  Educated provided: importance of repositioning and plan of care  Education provided to: nurse  Discussed importance of:repositioning every 2 hours, off-loading pressure to wound, their role in pressure injury prevention, head elevation <30 degrees, off-loading mattress and moisture management  Discussed plan of care with Nurse    Beatrice Michael, RN

## 2019-08-07 LAB
ANION GAP SERPL CALCULATED.3IONS-SCNC: 6 MMOL/L (ref 3–14)
ANION GAP SERPL CALCULATED.3IONS-SCNC: 6 MMOL/L (ref 3–14)
BACTERIA SPEC CULT: NORMAL
BUN SERPL-MCNC: 14 MG/DL (ref 7–30)
BUN SERPL-MCNC: 16 MG/DL (ref 7–30)
CALCIUM SERPL-MCNC: 7.6 MG/DL (ref 8.5–10.1)
CALCIUM SERPL-MCNC: 7.9 MG/DL (ref 8.5–10.1)
CHLORIDE SERPL-SCNC: 109 MMOL/L (ref 94–109)
CHLORIDE SERPL-SCNC: 110 MMOL/L (ref 94–109)
CO2 SERPL-SCNC: 23 MMOL/L (ref 20–32)
CO2 SERPL-SCNC: 25 MMOL/L (ref 20–32)
CREAT SERPL-MCNC: 0.7 MG/DL (ref 0.66–1.25)
CREAT SERPL-MCNC: 0.76 MG/DL (ref 0.66–1.25)
ERYTHROCYTE [DISTWIDTH] IN BLOOD BY AUTOMATED COUNT: 14.9 % (ref 10–15)
GFR SERPL CREATININE-BSD FRML MDRD: >90 ML/MIN/{1.73_M2}
GFR SERPL CREATININE-BSD FRML MDRD: >90 ML/MIN/{1.73_M2}
GLUCOSE BLDC GLUCOMTR-MCNC: 81 MG/DL (ref 70–99)
GLUCOSE BLDC GLUCOMTR-MCNC: 92 MG/DL (ref 70–99)
GLUCOSE BLDC GLUCOMTR-MCNC: 93 MG/DL (ref 70–99)
GLUCOSE BLDC GLUCOMTR-MCNC: 93 MG/DL (ref 70–99)
GLUCOSE SERPL-MCNC: 97 MG/DL (ref 70–99)
GLUCOSE SERPL-MCNC: 98 MG/DL (ref 70–99)
HCT VFR BLD AUTO: 33.6 % (ref 40–53)
HGB BLD-MCNC: 10.6 G/DL (ref 13.3–17.7)
LACTATE BLD-SCNC: 1.3 MMOL/L (ref 0.7–2)
MCH RBC QN AUTO: 29.3 PG (ref 26.5–33)
MCHC RBC AUTO-ENTMCNC: 31.5 G/DL (ref 31.5–36.5)
MCV RBC AUTO: 93 FL (ref 78–100)
MRSA DNA SPEC QL NAA+PROBE: NEGATIVE
MRSA DNA SPEC QL NAA+PROBE: POSITIVE
PLATELET # BLD AUTO: 124 10E9/L (ref 150–450)
POTASSIUM SERPL-SCNC: 3.2 MMOL/L (ref 3.4–5.3)
POTASSIUM SERPL-SCNC: 3.9 MMOL/L (ref 3.4–5.3)
POTASSIUM SERPL-SCNC: 4 MMOL/L (ref 3.4–5.3)
RBC # BLD AUTO: 3.62 10E12/L (ref 4.4–5.9)
SODIUM SERPL-SCNC: 139 MMOL/L (ref 133–144)
SODIUM SERPL-SCNC: 140 MMOL/L (ref 133–144)
SPECIMEN SOURCE: ABNORMAL
SPECIMEN SOURCE: NORMAL
WBC # BLD AUTO: 9.3 10E9/L (ref 4–11)

## 2019-08-07 PROCEDURE — 25800030 ZZH RX IP 258 OP 636: Performed by: INTERNAL MEDICINE

## 2019-08-07 PROCEDURE — 25000132 ZZH RX MED GY IP 250 OP 250 PS 637: Mod: GY | Performed by: INTERNAL MEDICINE

## 2019-08-07 PROCEDURE — 40000275 ZZH STATISTIC RCP TIME EA 10 MIN

## 2019-08-07 PROCEDURE — 80048 BASIC METABOLIC PNL TOTAL CA: CPT | Performed by: HOSPITALIST

## 2019-08-07 PROCEDURE — 25000125 ZZHC RX 250: Performed by: HOSPITALIST

## 2019-08-07 PROCEDURE — 94640 AIRWAY INHALATION TREATMENT: CPT

## 2019-08-07 PROCEDURE — 80048 BASIC METABOLIC PNL TOTAL CA: CPT | Performed by: INTERNAL MEDICINE

## 2019-08-07 PROCEDURE — 99233 SBSQ HOSP IP/OBS HIGH 50: CPT | Performed by: INTERNAL MEDICINE

## 2019-08-07 PROCEDURE — 84132 ASSAY OF SERUM POTASSIUM: CPT | Performed by: INTERNAL MEDICINE

## 2019-08-07 PROCEDURE — 00000146 ZZHCL STATISTIC GLUCOSE BY METER IP

## 2019-08-07 PROCEDURE — 83605 ASSAY OF LACTIC ACID: CPT | Performed by: INTERNAL MEDICINE

## 2019-08-07 PROCEDURE — 25000128 H RX IP 250 OP 636: Performed by: HOSPITALIST

## 2019-08-07 PROCEDURE — 85027 COMPLETE CBC AUTOMATED: CPT | Performed by: HOSPITALIST

## 2019-08-07 PROCEDURE — 20000003 ZZH R&B ICU

## 2019-08-07 PROCEDURE — 94640 AIRWAY INHALATION TREATMENT: CPT | Mod: 76

## 2019-08-07 PROCEDURE — 25000128 H RX IP 250 OP 636: Performed by: INTERNAL MEDICINE

## 2019-08-07 PROCEDURE — 40000901 ZZH STATISTIC WOC PT EDUCATION, 0-15 MIN

## 2019-08-07 PROCEDURE — 25000132 ZZH RX MED GY IP 250 OP 250 PS 637: Mod: GY | Performed by: STUDENT IN AN ORGANIZED HEALTH CARE EDUCATION/TRAINING PROGRAM

## 2019-08-07 RX ORDER — BISACODYL 10 MG
10 SUPPOSITORY, RECTAL RECTAL DAILY PRN
Status: DISCONTINUED | OUTPATIENT
Start: 2019-08-07 | End: 2019-08-13 | Stop reason: HOSPADM

## 2019-08-07 RX ADMIN — IPRATROPIUM BROMIDE AND ALBUTEROL SULFATE 3 ML: .5; 3 SOLUTION RESPIRATORY (INHALATION) at 20:02

## 2019-08-07 RX ADMIN — IPRATROPIUM BROMIDE AND ALBUTEROL SULFATE 3 ML: .5; 3 SOLUTION RESPIRATORY (INHALATION) at 15:41

## 2019-08-07 RX ADMIN — METRONIDAZOLE 500 MG: 500 INJECTION, SOLUTION INTRAVENOUS at 09:35

## 2019-08-07 RX ADMIN — POTASSIUM CHLORIDE 10 MEQ: 7.46 INJECTION, SOLUTION INTRAVENOUS at 03:18

## 2019-08-07 RX ADMIN — METRONIDAZOLE 500 MG: 500 INJECTION, SOLUTION INTRAVENOUS at 05:24

## 2019-08-07 RX ADMIN — CEFEPIME HYDROCHLORIDE 2 G: 2 INJECTION, POWDER, FOR SOLUTION INTRAVENOUS at 16:05

## 2019-08-07 RX ADMIN — POTASSIUM CHLORIDE 10 MEQ: 7.46 INJECTION, SOLUTION INTRAVENOUS at 02:15

## 2019-08-07 RX ADMIN — POTASSIUM CHLORIDE 10 MEQ: 7.46 INJECTION, SOLUTION INTRAVENOUS at 01:07

## 2019-08-07 RX ADMIN — SODIUM CHLORIDE, POTASSIUM CHLORIDE, SODIUM LACTATE AND CALCIUM CHLORIDE: 600; 310; 30; 20 INJECTION, SOLUTION INTRAVENOUS at 06:13

## 2019-08-07 RX ADMIN — CEFEPIME HYDROCHLORIDE 2 G: 2 INJECTION, POWDER, FOR SOLUTION INTRAVENOUS at 07:51

## 2019-08-07 RX ADMIN — IPRATROPIUM BROMIDE AND ALBUTEROL SULFATE 3 ML: .5; 3 SOLUTION RESPIRATORY (INHALATION) at 03:54

## 2019-08-07 RX ADMIN — BISACODYL 10 MG: 10 SUPPOSITORY RECTAL at 10:22

## 2019-08-07 RX ADMIN — IPRATROPIUM BROMIDE AND ALBUTEROL SULFATE 3 ML: .5; 3 SOLUTION RESPIRATORY (INHALATION) at 11:52

## 2019-08-07 RX ADMIN — IPRATROPIUM BROMIDE AND ALBUTEROL SULFATE 3 ML: .5; 3 SOLUTION RESPIRATORY (INHALATION) at 23:51

## 2019-08-07 RX ADMIN — POTASSIUM CHLORIDE 10 MEQ: 7.46 INJECTION, SOLUTION INTRAVENOUS at 04:29

## 2019-08-07 RX ADMIN — ENOXAPARIN SODIUM 40 MG: 40 INJECTION SUBCUTANEOUS at 12:48

## 2019-08-07 RX ADMIN — SODIUM CHLORIDE, POTASSIUM CHLORIDE, SODIUM LACTATE AND CALCIUM CHLORIDE: 600; 310; 30; 20 INJECTION, SOLUTION INTRAVENOUS at 21:57

## 2019-08-07 RX ADMIN — IPRATROPIUM BROMIDE AND ALBUTEROL SULFATE 3 ML: .5; 3 SOLUTION RESPIRATORY (INHALATION) at 08:17

## 2019-08-07 RX ADMIN — ACETAMINOPHEN 650 MG: 650 SUPPOSITORY RECTAL at 18:43

## 2019-08-07 RX ADMIN — CEFEPIME HYDROCHLORIDE 2 G: 2 INJECTION, POWDER, FOR SOLUTION INTRAVENOUS at 00:15

## 2019-08-07 ASSESSMENT — ACTIVITIES OF DAILY LIVING (ADL)
ADLS_ACUITY_SCORE: 35

## 2019-08-07 NOTE — PLAN OF CARE
ICU End of Shift Summary.  For vital signs and complete assessments, please see documentation flowsheets.     Pertinent assessments: VSS, Tmax 100.7 orally. Lethargic and confused upon arrival to unit, pt more alert this AM. Denies pain. Tele: ST; HR 100s-110s. 2L O2 NC; lung sounds diminished; productive cough. PICC in place and infusing LR @125 and intermittent abx. K replaced this shift. Pt strict NPO, no BM this shift. Suprapubic catheter in place with low urine output. Wounds on coccyx and reddened heels. Ax2 to turn and reposition Q2hrs.     Major Shift Events: intermittent abx, replaced K, fever trending down,   Plan (Upcoming Events): abx, monitor temps  Discharge/Transfer Needs: TBD    Bedside Shift Report Completed : yes  Bedside Safety Check Completed: yes

## 2019-08-07 NOTE — PROGRESS NOTES
[facility]  Hospitalist Progress Note  Pablo Braun MD, MD 08/07/2019  (Text Page)  Reason for Stay (Diagnosis): Sepsis with Proteus bacteremia         Assessment and Plan:      Summary of Stay: Wm Belcher is a 64 year old male with known prior history of multiple sclerosis, chronic Marie catheter state, prior aspiration pneumonia requiring PEG tube feeding in the past and was admitted on 8/5/2019 with fevers, chills and cough and transferred to the ICU setting last August 6, 2019 for mental status changes and high fever spikes.    Problem List:   1. Sepsis with isolated Proteus bacteremia  2. Suspected urinary tract infection source  3. Metabolic encephalopathy secondary to #1-appears to be improving  4. Multiple sclerosis  5. Known history of CAD  6. Depression  7. Prior history of aspiration pneumonia-concerns for dysphagia/aspiration?    Continue current ICU care.  Remain on intravenous antibiotics with cefepime.  Flagyl has been discontinued earlier.  ID service evaluation requested earlier.  Keep n.p.o.  SLP will be following with us.  Recommending to defer further video swallow today given recent mental status changes reconsider reevaluation in the next day or so.  Multiple medications on hold given current n.p.o. Status.  We will likely need to clarify from patient's healthcare decision makers regarding his CODE STATUS.  We were informed that he has a long-standing known DNR/DNI based on his prior POLST but was subsequently changed earlier though full CODE STATUS.      DVT Prophylaxis: Enoxaparin (Lovenox) SQ  Code Status: Full Code  Discharge Dispo: To be determined  Estimated Disch Date / # of Days until Disch: Likely will still be needing at least 2 more inpatient hospitalization days.        Interval History (Subjective):      I have assume service care today.  Seen and examined.  Chart reviewed.  Case discussed with nursing service and ICU team  I found Wm dickens  comfortably in bed and appears to be conversant, pleasant and interactive.  He was transferred to the ICU setting last night due to worsening sepsis with mental status changes and  fever spikes.                Physical Exam:      Last Vital Signs:  /77   Pulse 116   Temp 100  F (37.8  C) (Oral)   Resp 21   Wt 70.3 kg (155 lb)   SpO2 92%     I/O last 3 completed shifts:  In: 2544.42 [I.V.:2544.42]  Out: 655 [Urine:655]  Wt Readings from Last 1 Encounters:   08/06/19 70.3 kg (155 lb)     Vitals:    08/05/19 1901 08/06/19 0049   Weight: 86.2 kg (190 lb) 70.3 kg (155 lb)       Constitutional: Awake, alert, cooperative, no apparent distress   Respiratory: Clear to auscultation bilaterally, no crackles or wheezing   Cardiovascular:  Tachycardic rate at 110 bpm and regular rhythm, normal S1 and S2   Abdomen: Normal bowel sounds, soft, non-distended, non-tender   Skin: No rashes, no cyanosis, dry to touch   Neuro: Alert and oriented x3, no weakness, spontaneous and coherent speech   Extremities: No edema, normal range of motion   Other(s):  Flat affect, not agitated       All other systems: Negative          Medications:      All current medications were reviewed with changes reflected in problem list.         Data:      All new lab and imaging data was reviewed.   Labs:  Recent Labs   Lab 08/06/19  2213 08/06/19  1608 08/06/19  1546 08/05/19  1955/19  1803 08/05/19  1657   CULT PENDING No growth after 11 hours No growth after 11 hours Culture in progress  >100,000 colonies/mL  Proteus mirabilis  *  Susceptibility testing in progress Cultured on the 1st day of incubation:  Gram negative rods  *  Critical Value/Significant Value, preliminary result only, called to and read back by  Morris Real RN [email or web address] @ 0529 8.6.19 JE    Susceptibility testing in progress  (Note)  POSITIVE for PROTEUS SPECIES by Sque[place]eigene multiplex nucleic acid test.  Final identification and antimicrobial susceptibility testing will  be  verified by standard methods.    Specimen tested with Verigene multiplex, gram-negative blood culture  nucleic acid test for the following targets: Acinetobacter sp.,  Citrobacter sp., Enterobacter sp., Proteus sp., E. coli, K.  pneumoniae/oxytoca, P. aeruginosa, and the following resistance  markers: CTXM, KPC, NDM, VIM, IMP and OXA.    Critical Value/Significant Value called to and read back by  Morris Real RN @ 0996. 8/5/19. AMV   No growth after 2 days     Recent Labs   Lab 08/07/19 0530 08/06/19 1956 08/06/19  0705   WBC 9.3 5.9 9.8   HGB 10.6* 11.6* 12.4*   HCT 33.6* 35.4* 38.5*   MCV 93 92 93   * 132* 166     Recent Labs   Lab 08/07/19  0750 08/07/19 0530 08/07/19  0100 08/06/19 1956 08/05/19  1658   NA  --  139 140 140   < > 138   POTASSIUM 4.0 3.9 3.2* 3.2*   < > 4.0   CHLORIDE  --  110* 109 109   < > 104   CO2  --  23 25 22   < > 28   ANIONGAP  --  6 6 9   < > 6   GLC  --  98 97 86   < > 124*   BUN  --  14 16 15   < > 17   CR  --  0.70 0.76 0.74   < > 0.73   GFRESTIMATED  --  >90 >90 >90   < > >90   GFRESTBLACK  --  >90 >90 >90   < > >90   KARTHIKEYAN  --  7.9* 7.6* 8.3*   < > 9.2   PROTTOTAL  --   --   --  6.1*  --  7.9   ALBUMIN  --   --   --  2.4*  --  3.3*   BILITOTAL  --   --   --  0.6  --  0.7   ALKPHOS  --   --   --  159*  --  117   AST  --   --   --  60*  --  32   ALT  --   --   --  65  --  47    < > = values in this interval not displayed.     No results for input(s): SED, CRP in the last 168 hours.  Recent Labs   Lab 08/07/19  0809 08/07/19 0530 08/07/19  0400 08/07/19  0100 08/06/19  2350 08/06/19  2022 08/06/19  1956 08/06/19  0705 08/05/19  1658   GLC  --  98  --  97  --   --  86 104* 124*   BGM 93  --  81  --  92 100*  --   --   --      No results for input(s): INR in the last 168 hours.  No results for input(s): CHOL, HDL, LDL, TRIG, CHOLHDLRATIO in the last 168 hours.  Recent Labs   Lab 08/05/19 1658   TROPI <0.015     Recent Labs   Lab 08/1955   COLOR Yellow   APPEARANCE  Clear   URINEGLC Negative   URINEBILI Negative   URINEKETONE Negative   SG 1.010   UBLD Moderate*   URINEPH 6.5   PROTEIN 30*   NITRITE Negative   LEUKEST Large*   RBCU 76*   WBCU 78*      Imaging:   Results for orders placed or performed during the hospital encounter of 08/05/19   Chest XR,  PA & LAT    Narrative    EXAM: XR CHEST 2 VW  LOCATION: [facility]  DATE/TIME: 8/5/2019 5:54 PM    INDICATION: Cough.  COMPARISON: None.    FINDINGS: Atelectasis and volume loss in the right lower lobe with a platelike area of dense consolidation. This could represent a postobstructive pneumonitis. CT would be helpful to further characterize. Heart size normal.    NOTE: ABNORMAL REPORT    THE DICTATION ABOVE DESCRIBES AN ABNORMALITY FOR WHICH FOLLOWUP IS NEEDED.    CT Chest Pulmonary Embolism w Contrast    Narrative    EXAM: CT CHEST PULMONARY EMBOLISM W CONTRAST  LOCATION: [facility]  DATE/TIME: 8/5/2019 7:12 PM    INDICATION: Chest pain, shortness of breath.  COMPARISON: None  TECHNIQUE: Helical acquisition through the chest was performed during the arterial phase of contrast enhancement using IV contrast. 2D and 3D reconstructions were performed by the CT technologist. Dose reduction techniques were used.   IV CONTRAST: 76mL Isovue-370    FINDINGS:  ANGIOGRAM CHEST: Pulmonary arteries are normal caliber and negative for pulmonary emboli. Normal caliber thoracic aorta with no dissection or aneurysm.     LUNGS AND PLEURA: No pneumothorax. Lateral lower lobe pleural thickening. Linear areas of subsegmental atelectasis in both lung bases. Calcified granuloma left base.    MEDIASTINUM: Coronary artery calcifications. No mediastinal adenopathy.    LIMITED UPPER ABDOMEN: Postop cholecystectomy. Calcified splenic granuloma.    MUSCULOSKELETAL: Negative.      Impression    CONCLUSION:  1.  Negative for pulmonary embolism.  2.  Bilateral areas of pleural thickening and bibasilar areas of subsegmental  atelectasis.  3.  Calcified splenic granuloma and parenchymal lung calcified granuloma consistent with previous granulomatous infection.   XR Chest Port 1 View    Narrative    CHEST PORTABLE ONE VIEW   8/6/2019 3:30 PM     HISTORY: Shortness of breath.    COMPARISON: 8/5/2019 chest x-ray.      Impression    IMPRESSION: Heart size is normal. Tortuous aorta. Pulmonary  vasculature is not distended. Mild left base atelectasis. Right lung  is clear. No evidence of pleural fluid. Atelectasis in the left base  is slightly more prominent than on the comparison study. Area of  atelectasis or fluid seen in the right lung base has resolved.    HEATHER VILLALTA MD   XR Chest Port 1 View    Narrative    EXAM: XR CHEST PORT 1 VW  LOCATION: [facility]  DATE/TIME: 8/6/2019 9:43 PM    INDICATION: PICC line placement  COMPARISON: August 6, 2019    FINDINGS: Left-sided PICC line tip overlies the right atrial superior vena caval junction. Volume loss and atelectasis right lower lobe unchanged. Linear atelectasis left base. Heart size normal.

## 2019-08-07 NOTE — PROGRESS NOTES
Pt transfer to ICU yesterday and will plan to order Heel lift boots to prevent pressure injury.  I: discussion with RN and order supplies  A/P: pt is at high risk for breakdown and PI.  Continue POC with Heel lift boots.

## 2019-08-07 NOTE — PLAN OF CARE
ICU End of Shift Summary.  For vital signs and complete assessments, please see documentation flowsheets.     Pertinent assessments: Pt is confused to time. VSS, except tachycardia. Video swallow eval postponed until pt more awake.  Major Shift Events: Pt up to chair today. Had a BM after suppository. Flagyl stopped. Spoke to Babs Belcher over the phone. Babs is the pt's health care agent. Babs would like pt to remain Full code per pt's wishes. Relayed this information to Dr. Braun.   Plan (Upcoming Events): Video swallow tomorrow.  Discharge/Transfer Needs: TBD    Bedside Shift Report Completed :   Bedside Safety Check Completed:

## 2019-08-07 NOTE — PROGRESS NOTES
RT: Patient wore 3L nasal cannula t/o shift- SPO2 92%. BS diminished. Orally suctioned for clear, thick secretions.    Duoneb given as scheduled. Breathing unlabored- RR 18-26 t/o shift. Will continue to follow and assess.

## 2019-08-07 NOTE — PROGRESS NOTES
RT- Pt resting comfortably on 2L NC throughout the night. Duoneb Q4 continued. Pt has a fair, productive cough- producing clear, thick sputum. Will continue to monitor and support.    Eduardo Santiago, RT on 8/7/2019 at 5:11 AM

## 2019-08-07 NOTE — PLAN OF CARE
Pt admitted with UTI, possible PNA. Tachycardic throughout shift, EKG and tele: ST.  Tmax 105 temporal, ice and tylenol given with some improvement.  Was alert and oriented, but became more lethargic and confused this evening. In Am, had attempted some food, coughed and pt felt like he was choking, sats ok.  Respiratory attempted nasopharyngeal suctioning, but pt not coughing, unsuccessful.  Pt suctioned, seen by speech but now NPO.  Lactic elevated, bolus given. Transferred to ICU, called report to ICU nurseFranchesac.

## 2019-08-07 NOTE — PLAN OF CARE
Discussion held with RN re: patient status. During rounds MD/team requested holding video swallow study today d/t lethargy with plans to hopefully complete tomorrow. Will closely monitor and complete as able.

## 2019-08-07 NOTE — CONSULTS
Consult Date:  08/07/2019      INFECTIOUS DISEASE CONSULTATION      ROOM:  367, ICU      REQUESTING PHYSICIAN:  Dr. Braun.      IMPRESSION:   1.  A 64-year-old male with acute sepsis, found to have Proteus bacteremia, same organism in the urine, almost certainly urosepsis, his primary diagnosis.   2.  Acute respiratory failure associated with the sepsis, not convincing pneumonia, at risk for aspiration, but doubt second lung infection, or at least different organism.   3.  Multiple sclerosis.   4.  History of prior aspiration.      RECOMMENDATIONS:   1.  Cefepime for now, simplify as the full culture information tells us.   2.  Abdominal imaging or urinary imaging if not improving.   3.  Ongoing fever and sepsis in the early phase of pyelonephritis of this type with sepsis is not unusual, not indicative necessarily to have any treatment failure at this point.      HISTORY OF PRESENT ILLNESS:  This 64-year-old male is seen in consultation due to sepsis and apparently pyelonephritis and Marie-related urine infection.  The patient has a history of underlying multiple sclerosis.  Has not really had that much in the way of major urine infections, but does have a chronic Marie in place.  He presented with acute mental status changes, fever and some degree of cough.  At presentation, there was concern for pneumonia, although imaging did not really suggest clearcut new pneumonia.  The urine was grossly abnormal and now blood cultures are growing Proteus, the same organism in his urine.  He also has enterococcus in his urine.  The patient had some worsening last night, a day and into the antibiotics.  Has been on ceftriaxone and cefepime and with Flagyl added along with Zithromax for possible lung infection.  He was transferred to the ICU with some hypotension and hypoxia, but is feeling somewhat better currently.      PAST MEDICAL HISTORY:  Prior episodes of apparent aspiration pneumonia, not that much in the way of  major urinary tract infection problems historically, history of multiple sclerosis, history of underlying depression.      ALLERGIES:  NO ANTIMICROBIAL ALLERGIES.      MEDICATIONS:  As listed.      SOCIAL AND FAMILY HISTORY:  No recent travel or exposures.  Not previously known to have resistant pathogens, but MRSA is positive on a screen in the ICU entry.      REVIEW OF SYSTEMS:  Denies any pain or discomfort.  Does not have any abdominal pain or other focal symptoms.  No focal joint symptoms.  Neurologic status is improved.  His mentation is at baseline or near baseline.      PHYSICAL EXAMINATION:   GENERAL:  The patient appears his stated age.  Despite his known MS and the mental status changes, actually able to relate the history reasonably well.  Does not look that toxic or ill.  Hypoxia, on nasal cannula oxygen in the ICU.   VITAL SIGNS:  Blood pressure now stable.   HEENT:  No thrush or intraoral lesions.  Pupils reactive.   NECK:  Supple and nontender.   HEART AND LUNGS:  Unremarkable.   ABDOMEN:  Soft, nontender.  No flank tenderness.   EXTREMITIES:  No major rashes, embolic lesions.   NEUROLOGIC:  Changes of known MS.      LABORATORY DATA:  Blood cultures growing both Proteus and enterococcus in the urine.  Sensitivities to follow, but is negative by the rapid testing for any resistant codons.      Thank you very much for the consultation.  I will follow the patient with you.         HEATHER WARREN MD             D: 2019   T: 2019   MT: KIRIT      Name:     ALISSON FLETCHER   MRN:      [id number]        Account:       [id number]   :      1955           Consult Date:  2019      Document: [id number]       cc: Faraz Wheat MD

## 2019-08-07 NOTE — CONSULTS
ID consult dictated IMP 1 63 yo male neurogenic bladder acute sepsis, proteus BC and UC    REC cefepime , simplify to cx, doubt infectious pneumonia or abd infection, if ongoinf fever urinary imaging

## 2019-08-08 ENCOUNTER — APPOINTMENT (OUTPATIENT)
Dept: CARDIOLOGY | Facility: CLINIC | Age: 64
DRG: 698 | End: 2019-08-08
Attending: INTERNAL MEDICINE
Payer: MEDICARE

## 2019-08-08 ENCOUNTER — AMBULATORY - HEALTHEAST (OUTPATIENT)
Dept: OTHER | Facility: CLINIC | Age: 64
End: 2019-08-08

## 2019-08-08 ENCOUNTER — DOCUMENTATION ONLY (OUTPATIENT)
Dept: OTHER | Facility: CLINIC | Age: 64
End: 2019-08-08

## 2019-08-08 ENCOUNTER — APPOINTMENT (OUTPATIENT)
Dept: SPEECH THERAPY | Facility: CLINIC | Age: 64
DRG: 698 | End: 2019-08-08
Payer: MEDICARE

## 2019-08-08 ENCOUNTER — APPOINTMENT (OUTPATIENT)
Dept: GENERAL RADIOLOGY | Facility: CLINIC | Age: 64
DRG: 698 | End: 2019-08-08
Attending: INTERNAL MEDICINE
Payer: MEDICARE

## 2019-08-08 LAB
ANION GAP SERPL CALCULATED.3IONS-SCNC: 9 MMOL/L (ref 3–14)
BACTERIA SPEC CULT: ABNORMAL
BASOPHILS # BLD AUTO: 0 10E9/L (ref 0–0.2)
BASOPHILS NFR BLD AUTO: 0.1 %
BUN SERPL-MCNC: 13 MG/DL (ref 7–30)
CALCIUM SERPL-MCNC: 8.2 MG/DL (ref 8.5–10.1)
CHLORIDE SERPL-SCNC: 106 MMOL/L (ref 94–109)
CO2 SERPL-SCNC: 24 MMOL/L (ref 20–32)
CREAT SERPL-MCNC: 0.63 MG/DL (ref 0.66–1.25)
DIFFERENTIAL METHOD BLD: ABNORMAL
EOSINOPHIL # BLD AUTO: 0 10E9/L (ref 0–0.7)
EOSINOPHIL NFR BLD AUTO: 0 %
ERYTHROCYTE [DISTWIDTH] IN BLOOD BY AUTOMATED COUNT: 14.8 % (ref 10–15)
GFR SERPL CREATININE-BSD FRML MDRD: >90 ML/MIN/{1.73_M2}
GLUCOSE BLDC GLUCOMTR-MCNC: 75 MG/DL (ref 70–99)
GLUCOSE BLDC GLUCOMTR-MCNC: 87 MG/DL (ref 70–99)
GLUCOSE BLDC GLUCOMTR-MCNC: 88 MG/DL (ref 70–99)
GLUCOSE BLDC GLUCOMTR-MCNC: 88 MG/DL (ref 70–99)
GLUCOSE BLDC GLUCOMTR-MCNC: 91 MG/DL (ref 70–99)
GLUCOSE SERPL-MCNC: 84 MG/DL (ref 70–99)
HCT VFR BLD AUTO: 33.8 % (ref 40–53)
HGB BLD-MCNC: 10.8 G/DL (ref 13.3–17.7)
IMM GRANULOCYTES # BLD: 0.1 10E9/L (ref 0–0.4)
IMM GRANULOCYTES NFR BLD: 1.1 %
INTERPRETATION ECG - MUSE: NORMAL
LYMPHOCYTES # BLD AUTO: 0.4 10E9/L (ref 0.8–5.3)
LYMPHOCYTES NFR BLD AUTO: 3.6 %
Lab: ABNORMAL
MCH RBC QN AUTO: 29.1 PG (ref 26.5–33)
MCHC RBC AUTO-ENTMCNC: 32 G/DL (ref 31.5–36.5)
MCV RBC AUTO: 91 FL (ref 78–100)
MONOCYTES # BLD AUTO: 0.3 10E9/L (ref 0–1.3)
MONOCYTES NFR BLD AUTO: 3.3 %
NEUTROPHILS # BLD AUTO: 8.9 10E9/L (ref 1.6–8.3)
NEUTROPHILS NFR BLD AUTO: 91.9 %
NRBC # BLD AUTO: 0 10*3/UL
NRBC BLD AUTO-RTO: 0 /100
PLATELET # BLD AUTO: 126 10E9/L (ref 150–450)
POTASSIUM SERPL-SCNC: 2.9 MMOL/L (ref 3.4–5.3)
POTASSIUM SERPL-SCNC: 3.5 MMOL/L (ref 3.4–5.3)
RBC # BLD AUTO: 3.71 10E12/L (ref 4.4–5.9)
SODIUM SERPL-SCNC: 139 MMOL/L (ref 133–144)
SPECIMEN SOURCE: ABNORMAL
SPECIMEN SOURCE: ABNORMAL
WBC # BLD AUTO: 9.7 10E9/L (ref 4–11)

## 2019-08-08 PROCEDURE — 92611 MOTION FLUOROSCOPY/SWALLOW: CPT | Mod: GN | Performed by: SPEECH-LANGUAGE PATHOLOGIST

## 2019-08-08 PROCEDURE — 25000128 H RX IP 250 OP 636: Performed by: HOSPITALIST

## 2019-08-08 PROCEDURE — 12000000 ZZH R&B MED SURG/OB

## 2019-08-08 PROCEDURE — 40000275 ZZH STATISTIC RCP TIME EA 10 MIN

## 2019-08-08 PROCEDURE — 99233 SBSQ HOSP IP/OBS HIGH 50: CPT | Performed by: INTERNAL MEDICINE

## 2019-08-08 PROCEDURE — 25000132 ZZH RX MED GY IP 250 OP 250 PS 637: Mod: GY | Performed by: STUDENT IN AN ORGANIZED HEALTH CARE EDUCATION/TRAINING PROGRAM

## 2019-08-08 PROCEDURE — 92526 ORAL FUNCTION THERAPY: CPT | Mod: GN | Performed by: SPEECH-LANGUAGE PATHOLOGIST

## 2019-08-08 PROCEDURE — 00000146 ZZHCL STATISTIC GLUCOSE BY METER IP

## 2019-08-08 PROCEDURE — 93306 TTE W/DOPPLER COMPLETE: CPT | Mod: 26 | Performed by: INTERNAL MEDICINE

## 2019-08-08 PROCEDURE — 25000128 H RX IP 250 OP 636: Performed by: INTERNAL MEDICINE

## 2019-08-08 PROCEDURE — 40000983 ZZH STATISTIC HFNC ADULT NON-CPAP

## 2019-08-08 PROCEDURE — 80048 BASIC METABOLIC PNL TOTAL CA: CPT | Performed by: INTERNAL MEDICINE

## 2019-08-08 PROCEDURE — 85025 COMPLETE CBC W/AUTO DIFF WBC: CPT | Performed by: INTERNAL MEDICINE

## 2019-08-08 PROCEDURE — 27210300 ZZH CANNULA HIGH FLOW, ADULT

## 2019-08-08 PROCEDURE — 94640 AIRWAY INHALATION TREATMENT: CPT

## 2019-08-08 PROCEDURE — 40000264 ECHOCARDIOGRAM COMPLETE

## 2019-08-08 PROCEDURE — 84132 ASSAY OF SERUM POTASSIUM: CPT | Performed by: INTERNAL MEDICINE

## 2019-08-08 PROCEDURE — 25000125 ZZHC RX 250: Performed by: HOSPITALIST

## 2019-08-08 PROCEDURE — 93005 ELECTROCARDIOGRAM TRACING: CPT

## 2019-08-08 PROCEDURE — 94640 AIRWAY INHALATION TREATMENT: CPT | Mod: 76

## 2019-08-08 PROCEDURE — 25000125 ZZHC RX 250: Performed by: STUDENT IN AN ORGANIZED HEALTH CARE EDUCATION/TRAINING PROGRAM

## 2019-08-08 PROCEDURE — 71045 X-RAY EXAM CHEST 1 VIEW: CPT

## 2019-08-08 PROCEDURE — 25500064 ZZH RX 255 OP 636: Performed by: STUDENT IN AN ORGANIZED HEALTH CARE EDUCATION/TRAINING PROGRAM

## 2019-08-08 PROCEDURE — 25000125 ZZHC RX 250: Performed by: INTERNAL MEDICINE

## 2019-08-08 PROCEDURE — 93010 ELECTROCARDIOGRAM REPORT: CPT | Performed by: INTERNAL MEDICINE

## 2019-08-08 PROCEDURE — 74230 X-RAY XM SWLNG FUNCJ C+: CPT

## 2019-08-08 RX ORDER — BARIUM SULFATE 400 MG/ML
SUSPENSION ORAL ONCE
Status: COMPLETED | OUTPATIENT
Start: 2019-08-08 | End: 2019-08-08

## 2019-08-08 RX ORDER — METOPROLOL TARTRATE 1 MG/ML
2.5 INJECTION, SOLUTION INTRAVENOUS EVERY 12 HOURS
Status: DISCONTINUED | OUTPATIENT
Start: 2019-08-08 | End: 2019-08-09

## 2019-08-08 RX ORDER — FUROSEMIDE 10 MG/ML
40 INJECTION INTRAMUSCULAR; INTRAVENOUS ONCE
Status: COMPLETED | OUTPATIENT
Start: 2019-08-08 | End: 2019-08-08

## 2019-08-08 RX ADMIN — TAZOBACTAM SODIUM AND PIPERACILLIN SODIUM 3.38 G: 375; 3 INJECTION, SOLUTION INTRAVENOUS at 15:25

## 2019-08-08 RX ADMIN — TAZOBACTAM SODIUM AND PIPERACILLIN SODIUM 3.38 G: 375; 3 INJECTION, SOLUTION INTRAVENOUS at 20:34

## 2019-08-08 RX ADMIN — POTASSIUM CHLORIDE 20 MEQ: 29.8 INJECTION, SOLUTION INTRAVENOUS at 09:35

## 2019-08-08 RX ADMIN — FUROSEMIDE 40 MG: 10 INJECTION, SOLUTION INTRAVENOUS at 02:59

## 2019-08-08 RX ADMIN — METOPROLOL TARTRATE 2.5 MG: 5 INJECTION INTRAVENOUS at 02:45

## 2019-08-08 RX ADMIN — IPRATROPIUM BROMIDE AND ALBUTEROL SULFATE 3 ML: .5; 3 SOLUTION RESPIRATORY (INHALATION) at 11:03

## 2019-08-08 RX ADMIN — METOPROLOL TARTRATE 2.5 MG: 5 INJECTION INTRAVENOUS at 20:35

## 2019-08-08 RX ADMIN — IPRATROPIUM BROMIDE AND ALBUTEROL SULFATE 3 ML: .5; 3 SOLUTION RESPIRATORY (INHALATION) at 19:48

## 2019-08-08 RX ADMIN — METOPROLOL TARTRATE 2.5 MG: 5 INJECTION INTRAVENOUS at 08:34

## 2019-08-08 RX ADMIN — BARIUM SULFATE: 400 SUSPENSION ORAL at 13:02

## 2019-08-08 RX ADMIN — IPRATROPIUM BROMIDE AND ALBUTEROL SULFATE 3 ML: .5; 3 SOLUTION RESPIRATORY (INHALATION) at 08:27

## 2019-08-08 RX ADMIN — HUMAN ALBUMIN MICROSPHERES AND PERFLUTREN 3 ML: 10; .22 INJECTION, SOLUTION INTRAVENOUS at 10:45

## 2019-08-08 RX ADMIN — CEFEPIME HYDROCHLORIDE 2 G: 2 INJECTION, POWDER, FOR SOLUTION INTRAVENOUS at 08:01

## 2019-08-08 RX ADMIN — POTASSIUM CHLORIDE 20 MEQ: 29.8 INJECTION, SOLUTION INTRAVENOUS at 08:32

## 2019-08-08 RX ADMIN — IPRATROPIUM BROMIDE AND ALBUTEROL SULFATE 3 ML: .5; 3 SOLUTION RESPIRATORY (INHALATION) at 15:51

## 2019-08-08 RX ADMIN — ACETAMINOPHEN 650 MG: 650 SUPPOSITORY RECTAL at 05:45

## 2019-08-08 RX ADMIN — CEFEPIME HYDROCHLORIDE 2 G: 2 INJECTION, POWDER, FOR SOLUTION INTRAVENOUS at 00:07

## 2019-08-08 RX ADMIN — BARIUM SULFATE 240 ML: 400 SUSPENSION ORAL at 13:03

## 2019-08-08 RX ADMIN — IPRATROPIUM BROMIDE AND ALBUTEROL SULFATE 3 ML: .5; 3 SOLUTION RESPIRATORY (INHALATION) at 02:40

## 2019-08-08 RX ADMIN — POTASSIUM CHLORIDE 20 MEQ: 29.8 INJECTION, SOLUTION INTRAVENOUS at 10:56

## 2019-08-08 RX ADMIN — IPRATROPIUM BROMIDE AND ALBUTEROL SULFATE 3 ML: .5; 3 SOLUTION RESPIRATORY (INHALATION) at 23:36

## 2019-08-08 ASSESSMENT — ACTIVITIES OF DAILY LIVING (ADL)
ADLS_ACUITY_SCORE: 38
ADLS_ACUITY_SCORE: 39
ADLS_ACUITY_SCORE: 39
ADLS_ACUITY_SCORE: 38
ADLS_ACUITY_SCORE: 35
ADLS_ACUITY_SCORE: 38

## 2019-08-08 NOTE — PROGRESS NOTES
RT- Patient started the shift on HFNC 40L 80%, able to wean down to 20L 50% and mid day was able to put patient on 8L oximizer tolerating well. Receiving scheduled nebulizers. HFNC on stand by in case patient experiences and further respiratory distress.

## 2019-08-08 NOTE — PROGRESS NOTES
[facility]  Infectious Disease Progress Note          Assessment and Plan:   IMPRESSION:   1.  A 64-year-old male with acute sepsis, found to have Proteus bacteremia, same organism in the urine, almost certainly urosepsis, his primary diagnosis.   2.  Acute respiratory failure associated with the sepsis, not convincing pneumonia, at risk for aspiration, but doubt second lung infection, or at least different organism.   3.  Multiple sclerosis.   4.  History of prior aspiration.   5 MRSA colonization     RECOMMENDATIONS:   1.  Sens proteus Bc and UC, also enerococcus UC, given bacteremic with other UC org some coverage so to zosyn, if we are confident no resp infection could actually simplify all the way to ampiccillin   3.  Ongoing fever and sepsis in the early phase of pyelonephritis of this type with sepsis is not unusual, not indicative necessarily to have any treatment failure at this point. T now down, very sleepy O2 OK             Interval History:   no new complaints sl sleepy, T down BC and UC same pansens proteus, also sens enterococcus UC              Medications:       enoxaparin  40 mg Subcutaneous Q24H     ipratropium - albuterol 0.5 mg/2.5 mg/3 mL  3 mL Nebulization Q4H     metoprolol  2.5 mg Intravenous Q12H     piperacillin-tazobactam  3.375 g Intravenous Q6H     polyethylene glycol  17 g Oral Daily     senna-docusate  1 tablet Oral BID     sodium chloride (PF)  3 mL Intracatheter Q8H                  Physical Exam:   Blood pressure 114/78, pulse 97, temperature 97.8  F (36.6  C), temperature source Oral, resp. rate 18, weight 82.1 kg (181 lb), SpO2 97 %.  Wt Readings from Last 2 Encounters:   08/08/19 82.1 kg (181 lb)     Vital Signs with Ranges  Temp:  [97.7  F (36.5  C)-100  F (37.8  C)] 97.8  F (36.6  C)  Pulse:  [] 97  Heart Rate:  [] 93  Resp:  [9-32] 18  BP: ()/() 114/78  FiO2 (%):  [50 %-80 %] 50 %  SpO2:  [90 %-100 %] 97 %    Constitutional:  Awake, sleepy, cooperative, no apparent distress   Lungs: Clear to auscultation bilaterally, no crackles or wheezing   Cardiovascular: Regular rate and rhythm, normal S1 and S2, and no murmur noted   Abdomen: Normal bowel sounds, soft, non-distended, non-tender   Skin: No rashes, no cyanosis, no edema neuro same   Other:           Data:   All microbiology laboratory data reviewed.  Recent Labs   Lab Test 08/08/19 0630 08/07/19  0530 08/06/19  1956   WBC 9.7 9.3 5.9   HGB 10.8* 10.6* 11.6*   HCT 33.8* 33.6* 35.4*   MCV 91 93 92   * 124* 132*     Recent Labs   Lab Test 08/08/19  0630 08/07/19  0530 08/07/19  0100   CR 0.63* 0.70 0.76     No lab results found.  Recent Labs   Lab Test 08/06/19  2213 08/06/19  1608 08/06/19  1546 08/05/19  1955/19  1803 08/05/19  1657   CULT Culture negative monitoring continues No growth after 2 days No growth after 2 days >100,000 colonies/mL  Enterococcus faecalis  *  >100,000 colonies/mL  Proteus mirabilis  * Cultured on the 1st day of incubation:  Proteus mirabilis  *  Critical Value/Significant Value, preliminary result only, called to and read back by  Morris Real RN [email or web address] @ 0529 8.6.19 JE    (Note)  POSITIVE for PROTEUS SPECIES by Verigene multiplex nucleic acid test.  Final identification and antimicrobial susceptibility testing will be  verified by standard methods.    Specimen tested with Verigene multiplex, gram-negative blood culture  nucleic acid test for the following targets: Acinetobacter sp.,  Citrobacter sp., Enterobacter sp., Proteus sp., E. coli, K.  pneumoniae/oxytoca, P. aeruginosa, and the following resistance  markers: CTXM, KPC, NDM, VIM, IMP and OXA.    Critical Value/Significant Value called to and read back by  Morris Real RN @ 1755. 8/5/19. AMV   No growth after 3 days

## 2019-08-08 NOTE — PLAN OF CARE
ICU End of Shift Summary.  For vital signs and complete assessments, please see documentation flowsheets.     Pertinent assessments: A&Ox4, VSS, Afebrile. Tele shows sinus rhythm to sinus tach. Continues on HIFLO, lungs diminished. Weaning off oxygen as able. Abdomen rounded, non-distended, BS+. Large loose BM x2 Suprapubic catheter with adequate UOP. Urine straw color and cloudy. Receiving ABX for proteus bacteriemia. +2 -+3 edema.  to coccyx with mepilex in place. WOC following. Pulsate mattress with turns every 2 hours.     Major Shift Events: -Swallow study evaluation- pureed with nectar thick liquids. SLP would like to visit and evaluate pt during eating tomorrow at 10am.                                     -K 2.9, replacement protocol initiated                                     -Cefepime discontinued, Zosyn started                                    -ECHO completed    Plan (Upcoming Events): Monitor VSS, temp, and respiratory status. Wean off oxygen as able.   Discharge/Transfer Needs: TBD    Bedside Shift Report Completed : yes  Bedside Safety Check Completed: yes

## 2019-08-08 NOTE — PROGRESS NOTES
Video Swallow Study  [facility]  08/08/19 1327   General Information   Onset Date 08/06/19   Start of Care Date 08/06/19   Referring Physician Sophia Cochran MD   Patient Profile Review/OT: Additional Occupational Profile Info See Profile for full history and prior level of function   Patient/Family Goals Statement return home   Swallowing Evaluation Videofluoroscopic evaluation   Behaviorial Observations Alert  (cooperative)   Mode of current nutrition NPO   Respiratory Status O2 Supply   Type of O2 supply Nasal cannula   Comments Patient with history of dysphagia and PEG (~5 years ago per patient report). Admitted with sepsis and acute hypoxic respiratory failure.   VFSS Eval: Radiology   Radiologist Dr. Arteaga   Views Taken right lateral   Physical Location of Procedure Room #4   VFSS Eval: Thin Liquid Texture Trial   Mode of Presentation, Thin Liquid spoon;fed by clinician   Order of Presentation 1, 10-11   Preparatory Phase WFL   Oral Phase, Thin Liquid Premature pharyngeal entry   Pharyngeal Phase, Thin Liquid Residue in valleculae;Residue in pyriform sinus   Rosenbek's Penetration Aspiration Scale: Thin Liquid Trial Results 7 - contrast passes glottis, visible subglottic residue remains despite patient's response (aspiration)   Response to Aspiration unproductive reflexive involuntary cough/throat clear   Diagnostic Statement ralf aspiration with cough response on first trial   VFSS Eval: Nectar Thick Liquid Texture Trial   Mode of Presentation, Nectar spoon;fed by clinician;cup;self-fed   Order of Presentation 2-3 (spoon) 4-5 (cup)   Preparatory Phase WFL   Oral Phase, Nectar Premature pharyngeal entry   Pharyngeal Phase, Nectar Residue in valleculae;Residue in pyriform sinus   Rosenbek's Penetration Aspiration Scale: Nectar-Thick Liquid Trial Results 5 - contrast contacts vocal cords, visible residue remains (penetration)   Response to Aspiration, Nectar   (no throat clear)   Diagnostic Statement    Trace penetration to the level of the cords on one trial which did not clear. High risk of that material being aspirated after the swallow.   VFSS Eval: Honey Thick Texture Trial   Mode of Presentation, Honey spoon;fed by clinician   Order of Presentation 6   Preparatory Phase WFL   Oral Phase, Honey Premature pharyngeal entry   Pharyngeal Phase, Honey Residue in valleculae;Residue in pyriform sinus   Rosenbek's Penetration Aspiration Scale: Honey Trial Results 1 - no aspiration, contrast does not enter airway   Diagnostic Statement No aspiration   VFSS Eval: Puree Solid Texture Trial   Mode of Presentation, Puree spoon;fed by clinician   Order of Presentation 7   Preparatory Phase WFL   Oral Phase, Puree Premature pharyngeal entry   Pharyngeal Phase, Puree Residue in valleculae;Residue in pyriform sinus   Rosenbek's Penetration Aspiration Scale: Puree Food Trial Results 1 - no aspiration, contrast does not enter airway   Diagnostic Statement No aspiration   VFSS Eval: Semisolid Texture Trial   Mode of Presentation, Semisolid spoon;fed by clinician   Order of Presentation 8-9   Preparatory Phase   (Slow mastication)   Oral Phase, Semisolid Premature pharyngeal entry   Pharyngeal Phase, Semisolid Residue in valleculae;Residue in pyriform sinus   Rosenbek's Penetration Aspiration Scale: Semisolid Food Trial Results 1 - no aspiration, contrast does not enter airway   Diagnostic Statement No aspiration   Swallow Compensations   Swallow Compensations Alternate viscosity of consistencies;Pacing;Reduce amounts   Results Aspiration  (Inconsistent)   Esophageal Phase of Swallow   Patient reports or presents with symptoms of esophageal dysphagia No   General Therapy Interventions   Planned Therapy Interventions Dysphagia Treatment   Dysphagia treatment Modified diet education;Instruction of safe swallow strategies   Intervention Comments diet tolerance and strategy education   Swallow Eval: Clinical Impressions   Skilled  Criteria for Therapy Intervention Skilled criteria met.  Treatment indicated.   Functional Assessment Scale (FAS) 3   Dysphagia Outcome Severity Scale (CARMEN) Level 3 - CARMEN   Diet texture recommendations Dysphagia diet level 1;Nectar thick liquids   Recommended Feeding/Eating Techniques alternate between small bites and sips of food/liquid;maintain upright posture during/after eating for 30 mins;no straws;small sips/bites;tuck chin during every swallow   Therapy Frequency 5x/week   Predicted Duration of Therapy Intervention (days/wks) 1 week   Anticipated Discharge Disposition extended care facility   Risks and Benefits of Treatment have been explained. Yes   Patient, family and/or staff in agreement with Plan of Care Yes   Clinical Impression Comments Patient presents with a moderate oral-pharyngeal dysphagia consistent with his disagnosis of MS. Oral motor weakness resulting in premature pharyngeal entry across all consistencies. Quincy aspiration x1 with thin liquids via spoon with immediate cough response. Trace penetration x1 with nectar that was not cleared and increases the risk of aspiration after the swallow. Attempted to try chin tuck, however, patient not able to perform. Residue along the base of tongue, valleculae and pharynx that was minimal with thin and increased as viscosity increased. Attempted to show video to patient when exam was completed but was not interested. Did show the episode of aspiration and patient stated he was watching but did not engaged in dialog about incident.   Total Evaluation Time   Total Evaluation Time (Minutes) 45   Oscar Vargas MS CCC-SLP

## 2019-08-08 NOTE — ACP (ADVANCE CARE PLANNING)
"SPIRITUAL HEALTH SERVICES Progress Note  [facility] ICU    SH consult to assist pt with ACP/HCD.     Met with pt, Wm, who is awake and shares that he is tired, but welcomes conversation.     Pt was admitted to [facility] and initially listed as DNR/DNI but in conversation with his named HCA, Babs Belcher, this was changed to full code. In reviewing his current HCD dated 12/28/18 the following is noted:    He names Babs Belcher (\"sweetheart\") as his primary HCA    He names Wm Belcher (son) as alternate HCA    He has checked the preference regarding CPR \"I do not want CPR attempted if my heart or breathing stops.\"     Reviewed this with patient. He notes that he would not want to change anything regarding his named HCA.     Pt, Wm, does note that he wants to be listed as \"Full Code\" and shares he wants to \"go out kicking and screaming.\" Pointed out that this is in conflict with his current HCD. He notes that he would like to change that, but does not want to complete a new document at this time. He does request that I leave a document for him to complete in the future.     Have left new HCD document and supporting information per his request. Attempted to contact named HCA, Babs Belcher, regarding above conversation but she did not answer her phone. Later in the day, I did reach Babs but she deferred conversation as she was at work.     He goes on to say that he is Taoism (member of [employer]) and that he \"knows where I am going (heaven).\"     Pt also invited prayer during visit and we shared in prayer. Pt also engaged in brief, reflective conversation sharing his love of family, his two sons (Wm and Gabriele), his grandchildren, and his enjoyment of fishing.     Plan to attempt to f/u tomorrow to re-assess interest in completing new document while he is here to reflect the above stated wishes regarding CPR.Will also attempt to reach one of his HCA to update them regarding the above. "     REEMA Brennan.  Staff [facility]   Pager #[phone]

## 2019-08-08 NOTE — PROGRESS NOTES
Follow-up note:  Reviewed SLP evaluation and recommendations.  Started DD 1, nectar thickened fluids  Reported that he is been having frequent large loose bowel movement, nonbloody.  Afebrile.  Denies any abdominal pain.  No significant leukocytosis.  We will continue to monitor but will initiate rectal tube as patient has history of MS and at risk for skin breakdown as well.    Noted decreasing platelets level trend in the past 3 days.  No reported bleeding tendencies.  We will stop Lovenox for now.  On mechanical PCD's.

## 2019-08-08 NOTE — PLAN OF CARE
Discharge Planner SLP   Patient plan for discharge: did not state  Current status: Video swallow completed. Please see report for details. Quincy aspiration with thin liquids (x1 episode) with cough response; x1 trace penetration with nectar liquids that did not clear and potentially aspiration although not observed on video. With all consistencies there was premature spillage to the level of the pyriform sinus placing the patient at high risk of aspiration before the swallow. Patient wants to resume eating. Attempted to contact wife but unavailable and message was left.    Recommend cautiously starting a Dysphagia Diet Level 1 (Puree) with NECTAR-thick liquids with the following strategies: upright for all po and remain upright for one hour after; small bites; liquids via spoon only; chin down with all consistencies (speech will practice this in therapy). Discontinue po if any overt s/sx of aspiration occur. Please schedule AM meal for 10AM as SLP will do therapy at that time and assist with meal.    Returned for therapy session after video swallow to provide education to patient. Message was left for patient's wife.  Barriers to return to prior living situation: dysphagia  Recommendations for discharge: return to [facility]  Rationale for recommendations: Continued ST for diet tolerance, ability to upgrade diet, and strategy education.       Entered by: Oscar Whaley 08/08/2019 2:46 PM

## 2019-08-08 NOTE — PROGRESS NOTES
Tele-ICU note  Patient with dyspnea earlier in the evening.   He received a dose of IV Lasix 40 mgms and Lopressor.  Soon thereafter his symptoms improved markedly  CXR was suggestive of either mild volume overload and/or bibasilar atlectasis    lea choudhury  August 8, 2019

## 2019-08-08 NOTE — PROGRESS NOTES
[facility]  Hospitalist Progress Note  Pablo Braun MD, MD 08/08/2019  (Text Page)  Reason for Stay (Diagnosis): Sepsis with Proteus bacteremia         Assessment and Plan:      Summary of Stay: Wm Belcher is a 64 year old male with known prior history of multiple sclerosis, chronic Cuevas catheter state, prior aspiration pneumonia requiring PEG tube feeding in the past and was admitted on 8/5/2019 with fevers, chills and cough and transferred to the ICU setting last August 6, 2019 for mental status changes and high fever spikes.    Problem List:   1. Sepsis with isolated Proteus bacteremia  2. Suspected urinary tract infection source secondary to chronic cuevas catheter use  3. Metabolic encephalopathy secondary to #1-appears to be improving  4. Multiple sclerosis  5. Known history of CAD  6. Depression  7. Prior history of aspiration pneumonia-concerns for dysphagia/aspiration?  8. Earlier shortness of breath, hypoxia requiring oxygen supplementation.  Improvement with IV Lasix.  -Suspected volume overload versus bilateral basal atelectasis  9.  Hypokalemia-likely from earlier IV Lasix  -Correction, supplementation protocol in place  10. unstageable pressure injury on bilateral buttocks    Continue current ICU care.  Remain on intravenous antibiotics with cefepime.  Flagyl has been discontinued earlier.  Highly appreciate input from ID service  Keep n.p.o.  SLP will be following with us.  Plans for video swallow today  Continue with metoprolol 2.5 mg intravenous route.  Change to oral route once on a diet.  Have requested echocardiogram today due to earlier shortness of breath, hypoxia and concerns for volume overload.  Off IV fluids.  Earlier our nursing service contacted patient's healthcare decision-maker namely his ex-wife and discussed with them most recent changes with his CODE STATUS and this was also confirmed by the patient's healthcare decision-maker we will continue to full  code.    DVT Prophylaxis: Enoxaparin (Lovenox) SQ  Code Status: Full Code  Discharge Dispo: To be determined  Estimated Disch Date / # of Days until Disch: Likely will still be needing at least 2 more inpatient hospitalization days.        Interval History (Subjective):      Continuing service care today.  Seen and examined.  Chart reviewed.  Case discussed with nursing service and ICU team.  Patient's hemodynamics seems to be much improved.  Some issues with increasing shortness of breath and requiring more oxygen supplementation overnight.  Had significant improvement after a dose of IV Lasix as he responded with almost 3 L of diuresis since last night.  Mental state appears to be at baseline he is conversant, pleasant, flat affect though  Currently afebrile.  Decreasing oxygen supplementation as this is being weaned today.              Physical Exam:      Last Vital Signs:  /71   Pulse 101   Temp 98.3  F (36.8  C) (Oral)   Resp 11   Wt 82.1 kg (181 lb)   SpO2 99%     I/O last 3 completed shifts:  In: 2953.08 [I.V.:2953.08]  Out: 3310 [Urine:3310]  Wt Readings from Last 1 Encounters:   08/08/19 82.1 kg (181 lb)     Vitals:    08/05/19 1901 08/06/19 0049 08/08/19 0500   Weight: 86.2 kg (190 lb) 70.3 kg (155 lb) 82.1 kg (181 lb)       Constitutional: Awake, alert, cooperative, no apparent distress   Respiratory: Clear to auscultation bilaterally, no crackles or wheezing   Cardiovascular:  Normal rate and rhythm  normal S1 and S2   Abdomen: Normal bowel sounds, soft, non-distended, non-tender   Skin: No rashes, no cyanosis, dry to touch   Neuro: Alert and oriented x3, no weakness, spontaneous and coherent speech   Extremities: Nonpitting bilateral lower extremity edema, normal range of motion   Other(s):  Flat affect, not agitated       All other systems: Negative          Medications:      All current medications were reviewed with changes reflected in problem list.         Data:      All new lab and  imaging data was reviewed.   Labs:  Recent Labs   Lab 08/06/19  2213 08/06/19  1608 08/06/19  1546 08/1955 08/05/19  1803 08/05/19  1657   CULT Culture negative monitoring continues No growth after 2 days No growth after 2 days >100,000 colonies/mL  Enterococcus faecalis  *  >100,000 colonies/mL  Proteus mirabilis  * Cultured on the 1st day of incubation:  Proteus mirabilis  *  Critical Value/Significant Value, preliminary result only, called to and read back by  Morris Real RN [email or web address] @ 0529 8.6.19 JE    (Note)  POSITIVE for PROTEUS SPECIES by [employer] multiplex nucleic acid test.  Final identification and antimicrobial susceptibility testing will be  verified by standard methods.    Specimen tested with Verigene multiplex, gram-negative blood culture  nucleic acid test for the following targets: Acinetobacter sp.,  Citrobacter sp., Enterobacter sp., Proteus sp., E. coli, K.  pneumoniae/oxytoca, P. aeruginosa, and the following resistance  markers: CTXM, KPC, NDM, VIM, IMP and OXA.    Critical Value/Significant Value called to and read back by  Morris Real RN @ 1755. 8/5/19. AMV   No growth after 3 days     Recent Labs   Lab 08/08/19  0630 08/07/19 0530 08/06/19 1956   WBC 9.7 9.3 5.9   HGB 10.8* 10.6* 11.6*   HCT 33.8* 33.6* 35.4*   MCV 91 93 92   * 124* 132*     Recent Labs   Lab 08/08/19  0630 08/07/19  0750 08/07/19  0530 08/07/19  0100 08/06/19 1956 08/05/19  1658     --  139 140 140   < > 138   POTASSIUM 2.9* 4.0 3.9 3.2* 3.2*   < > 4.0   CHLORIDE 106  --  110* 109 109   < > 104   CO2 24  --  23 25 22   < > 28   ANIONGAP 9  --  6 6 9   < > 6   GLC 84  --  98 97 86   < > 124*   BUN 13  --  14 16 15   < > 17   CR 0.63*  --  0.70 0.76 0.74   < > 0.73   GFRESTIMATED >90  --  >90 >90 >90   < > >90   GFRESTBLACK >90  --  >90 >90 >90   < > >90   KARTHIKEYAN 8.2*  --  7.9* 7.6* 8.3*   < > 9.2   PROTTOTAL  --   --   --   --  6.1*  --  7.9   ALBUMIN  --   --   --   --  2.4*  --  3.3*   BILITOTAL  --    --   --   --  0.6  --  0.7   ALKPHOS  --   --   --   --  159*  --  117   AST  --   --   --   --  60*  --  32   ALT  --   --   --   --  65  --  47    < > = values in this interval not displayed.     No results for input(s): SED, CRP in the last 168 hours.  Recent Labs   Lab 08/08/19  0803 08/08/19  0630 08/08/19  0344 08/08/19  0016 08/07/19 1952 08/07/19  1156  08/07/19  0530  08/07/19  0100  08/06/19  1956 08/06/19  0705   GLC  --  84  --   --   --   --   --  98  --  97  --  86 104*   BGM 88  --  75 88 91 93   < >  --    < >  --    < >  --   --     < > = values in this interval not displayed.     No results for input(s): INR in the last 168 hours.  No results for input(s): CHOL, HDL, LDL, TRIG, CHOLHDLRATIO in the last 168 hours.  Recent Labs   Lab 08/05/19  1658   TROPI <0.015     Recent Labs   Lab 08/1955   COLOR Yellow   APPEARANCE Clear   URINEGLC Negative   URINEBILI Negative   URINEKETONE Negative   SG 1.010   UBLD Moderate*   URINEPH 6.5   PROTEIN 30*   NITRITE Negative   LEUKEST Large*   RBCU 76*   WBCU 78*      Imaging:   Results for orders placed or performed during the hospital encounter of 08/05/19   Chest XR,  PA & LAT    Narrative    EXAM: XR CHEST 2 VW  LOCATION: [facility]  DATE/TIME: 8/5/2019 5:54 PM    INDICATION: Cough.  COMPARISON: None.    FINDINGS: Atelectasis and volume loss in the right lower lobe with a platelike area of dense consolidation. This could represent a postobstructive pneumonitis. CT would be helpful to further characterize. Heart size normal.    NOTE: ABNORMAL REPORT    THE DICTATION ABOVE DESCRIBES AN ABNORMALITY FOR WHICH FOLLOWUP IS NEEDED.    CT Chest Pulmonary Embolism w Contrast    Narrative    EXAM: CT CHEST PULMONARY EMBOLISM W CONTRAST  LOCATION: [facility]  DATE/TIME: 8/5/2019 7:12 PM    INDICATION: Chest pain, shortness of breath.  COMPARISON: None  TECHNIQUE: Helical acquisition through the chest was performed during the arterial  phase of contrast enhancement using IV contrast. 2D and 3D reconstructions were performed by the CT technologist. Dose reduction techniques were used.   IV CONTRAST: 76mL [id number]-370    FINDINGS:  ANGIOGRAM CHEST: Pulmonary arteries are normal caliber and negative for pulmonary emboli. Normal caliber thoracic aorta with no dissection or aneurysm.     LUNGS AND PLEURA: No pneumothorax. Lateral lower lobe pleural thickening. Linear areas of subsegmental atelectasis in both lung bases. Calcified granuloma left base.    MEDIASTINUM: Coronary artery calcifications. No mediastinal adenopathy.    LIMITED UPPER ABDOMEN: Postop cholecystectomy. Calcified splenic granuloma.    MUSCULOSKELETAL: Negative.      Impression    CONCLUSION:  1.  Negative for pulmonary embolism.  2.  Bilateral areas of pleural thickening and bibasilar areas of subsegmental atelectasis.  3.  Calcified splenic granuloma and parenchymal lung calcified granuloma consistent with previous granulomatous infection.   XR Chest Port 1 View    Narrative    CHEST PORTABLE ONE VIEW   8/6/2019 3:30 PM     HISTORY: Shortness of breath.    COMPARISON: 8/5/2019 chest x-ray.      Impression    IMPRESSION: Heart size is normal. Tortuous aorta. Pulmonary  vasculature is not distended. Mild left base atelectasis. Right lung  is clear. No evidence of pleural fluid. Atelectasis in the left base  is slightly more prominent than on the comparison study. Area of  atelectasis or fluid seen in the right lung base has resolved.    HEATHER VILLALTA MD   XR Chest Port 1 View    Narrative    EXAM: XR CHEST PORT 1 VW  LOCATION: [facility]  DATE/TIME: 8/6/2019 9:43 PM    INDICATION: PICC line placement  COMPARISON: August 6, 2019    FINDINGS: Left-sided PICC line tip overlies the right atrial superior vena caval junction. Volume loss and atelectasis right lower lobe unchanged. Linear atelectasis left base. Heart size normal.

## 2019-08-08 NOTE — PLAN OF CARE
ICU End of Shift Summary.  For vital signs and complete assessments, please see documentation flowsheets.     Pertinent assessments: Increase in BP and HR following turn/reposition for requested bedpan. Pt C/O SOB and displayed respiratory distress. Dr. Rushing of Tele-ICU notified; prn lopressor, one time dose lasix IV given, decrease rate of IVF and CXR obtained, see results. HFNC applied by RT at 80%/40LPM. Pt improved after interventions, able to rest comfortably thereafter and reports easier breathing. Improvement in generalized edema, large UO after lasix. Tmax 100 axillary, tylenol suppository given with improvement on recheck. IV abx given as ordered.Heating pad ordered for PICC application, pt refused d/t feeling hot. Large soft BM overnight, incontinent. NPO pending upcoming swallow evaluation. Tele SR/ST. Disoriented to time, otherwise answers appropriately and able to make needs known. Continue to monitor.  Major Shift Events: Decrease IVF, lasix and lopressor IV, CXR; changed to HFNC  Plan (Upcoming Events): Swallow Evaluation; Advanced Directive consult planning   Discharge/Transfer Needs: TBD    Bedside Shift Report Completed : Yes  Bedside Safety Check Completed: Yes

## 2019-08-08 NOTE — PROGRESS NOTES
RT- Patient placed on HFNC 40L 80% at 0300 due to increase WOB, tachypnea, and SOB. Pt was NT suctioned for a small amount of cloudy secretions. Duoneb Q4 continued. Will continue to monitor and support.    Temp: 98.1  F (36.7  C) Temp src: Oral BP: (!) 136/94 Pulse: 117 Heart Rate: 121 Resp: 30 SpO2: 97 % O2 Device: High Flow Nasal Cannula (HFNC) Oxygen Delivery: Other (Comments)(40LPM)    Eduardo Santiago, RT on 8/8/2019 at 3:24 AM

## 2019-08-09 ENCOUNTER — APPOINTMENT (OUTPATIENT)
Dept: SPEECH THERAPY | Facility: CLINIC | Age: 64
DRG: 698 | End: 2019-08-09
Payer: MEDICARE

## 2019-08-09 LAB
ANION GAP SERPL CALCULATED.3IONS-SCNC: 8 MMOL/L (ref 3–14)
BASOPHILS # BLD AUTO: 0 10E9/L (ref 0–0.2)
BASOPHILS NFR BLD AUTO: 0.2 %
BUN SERPL-MCNC: 13 MG/DL (ref 7–30)
CALCIUM SERPL-MCNC: 7.9 MG/DL (ref 8.5–10.1)
CHLORIDE SERPL-SCNC: 108 MMOL/L (ref 94–109)
CO2 SERPL-SCNC: 25 MMOL/L (ref 20–32)
CREAT SERPL-MCNC: 0.47 MG/DL (ref 0.66–1.25)
DIFFERENTIAL METHOD BLD: ABNORMAL
EOSINOPHIL # BLD AUTO: 0 10E9/L (ref 0–0.7)
EOSINOPHIL NFR BLD AUTO: 0.1 %
ERYTHROCYTE [DISTWIDTH] IN BLOOD BY AUTOMATED COUNT: 14.7 % (ref 10–15)
GFR SERPL CREATININE-BSD FRML MDRD: >90 ML/MIN/{1.73_M2}
GLUCOSE SERPL-MCNC: 95 MG/DL (ref 70–99)
HCT VFR BLD AUTO: 31.6 % (ref 40–53)
HGB BLD-MCNC: 10.5 G/DL (ref 13.3–17.7)
IMM GRANULOCYTES # BLD: 0.1 10E9/L (ref 0–0.4)
IMM GRANULOCYTES NFR BLD: 0.8 %
INTERPRETATION ECG - MUSE: NORMAL
LYMPHOCYTES # BLD AUTO: 0.7 10E9/L (ref 0.8–5.3)
LYMPHOCYTES NFR BLD AUTO: 7.9 %
MCH RBC QN AUTO: 30 PG (ref 26.5–33)
MCHC RBC AUTO-ENTMCNC: 33.2 G/DL (ref 31.5–36.5)
MCV RBC AUTO: 90 FL (ref 78–100)
MONOCYTES # BLD AUTO: 0.6 10E9/L (ref 0–1.3)
MONOCYTES NFR BLD AUTO: 7.7 %
NEUTROPHILS # BLD AUTO: 6.9 10E9/L (ref 1.6–8.3)
NEUTROPHILS NFR BLD AUTO: 83.3 %
NRBC # BLD AUTO: 0 10*3/UL
NRBC BLD AUTO-RTO: 0 /100
PLATELET # BLD AUTO: 126 10E9/L (ref 150–450)
POTASSIUM SERPL-SCNC: 2.9 MMOL/L (ref 3.4–5.3)
POTASSIUM SERPL-SCNC: 3.7 MMOL/L (ref 3.4–5.3)
RBC # BLD AUTO: 3.5 10E12/L (ref 4.4–5.9)
SODIUM SERPL-SCNC: 141 MMOL/L (ref 133–144)
WBC # BLD AUTO: 8.3 10E9/L (ref 4–11)

## 2019-08-09 PROCEDURE — 40000893 ZZH STATISTIC PT IP EVAL DEFER: Performed by: PHYSICAL THERAPIST

## 2019-08-09 PROCEDURE — 94640 AIRWAY INHALATION TREATMENT: CPT | Mod: 76

## 2019-08-09 PROCEDURE — 25000125 ZZHC RX 250: Performed by: HOSPITALIST

## 2019-08-09 PROCEDURE — 92526 ORAL FUNCTION THERAPY: CPT | Mod: GN | Performed by: SPEECH-LANGUAGE PATHOLOGIST

## 2019-08-09 PROCEDURE — 40000275 ZZH STATISTIC RCP TIME EA 10 MIN

## 2019-08-09 PROCEDURE — 94640 AIRWAY INHALATION TREATMENT: CPT

## 2019-08-09 PROCEDURE — 25000132 ZZH RX MED GY IP 250 OP 250 PS 637: Mod: GY | Performed by: INTERNAL MEDICINE

## 2019-08-09 PROCEDURE — 25000128 H RX IP 250 OP 636: Performed by: INTERNAL MEDICINE

## 2019-08-09 PROCEDURE — 80048 BASIC METABOLIC PNL TOTAL CA: CPT | Performed by: INTERNAL MEDICINE

## 2019-08-09 PROCEDURE — 40000274 ZZH STATISTIC RCP CONSULT EA 30 MIN

## 2019-08-09 PROCEDURE — 99233 SBSQ HOSP IP/OBS HIGH 50: CPT | Performed by: INTERNAL MEDICINE

## 2019-08-09 PROCEDURE — 84132 ASSAY OF SERUM POTASSIUM: CPT | Performed by: INTERNAL MEDICINE

## 2019-08-09 PROCEDURE — 85025 COMPLETE CBC W/AUTO DIFF WBC: CPT | Performed by: INTERNAL MEDICINE

## 2019-08-09 PROCEDURE — 12000000 ZZH R&B MED SURG/OB

## 2019-08-09 RX ORDER — MODAFINIL 100 MG/1
100 TABLET ORAL DAILY
Status: DISCONTINUED | OUTPATIENT
Start: 2019-08-10 | End: 2019-08-13 | Stop reason: HOSPADM

## 2019-08-09 RX ORDER — ACETAMINOPHEN 325 MG/1
975 TABLET ORAL EVERY 6 HOURS PRN
Status: DISCONTINUED | OUTPATIENT
Start: 2019-08-09 | End: 2019-08-13 | Stop reason: HOSPADM

## 2019-08-09 RX ORDER — ASPIRIN 81 MG/1
81 TABLET ORAL DAILY
Status: DISCONTINUED | OUTPATIENT
Start: 2019-08-09 | End: 2019-08-13 | Stop reason: HOSPADM

## 2019-08-09 RX ORDER — ATORVASTATIN CALCIUM 40 MG/1
40 TABLET, FILM COATED ORAL EVERY EVENING
Status: DISCONTINUED | OUTPATIENT
Start: 2019-08-09 | End: 2019-08-13 | Stop reason: HOSPADM

## 2019-08-09 RX ADMIN — ACETAMINOPHEN 975 MG: 325 TABLET, FILM COATED ORAL at 01:45

## 2019-08-09 RX ADMIN — TAZOBACTAM SODIUM AND PIPERACILLIN SODIUM 3.38 G: 375; 3 INJECTION, SOLUTION INTRAVENOUS at 09:50

## 2019-08-09 RX ADMIN — TAZOBACTAM SODIUM AND PIPERACILLIN SODIUM 3.38 G: 375; 3 INJECTION, SOLUTION INTRAVENOUS at 14:58

## 2019-08-09 RX ADMIN — Medication 12.5 MG: at 21:16

## 2019-08-09 RX ADMIN — BACLOFEN 30 MG: 20 TABLET ORAL at 16:31

## 2019-08-09 RX ADMIN — ACETAMINOPHEN 975 MG: 325 TABLET, FILM COATED ORAL at 16:30

## 2019-08-09 RX ADMIN — IPRATROPIUM BROMIDE AND ALBUTEROL SULFATE 3 ML: .5; 3 SOLUTION RESPIRATORY (INHALATION) at 11:43

## 2019-08-09 RX ADMIN — IPRATROPIUM BROMIDE AND ALBUTEROL SULFATE 3 ML: .5; 3 SOLUTION RESPIRATORY (INHALATION) at 03:09

## 2019-08-09 RX ADMIN — Medication 12.5 MG: at 08:54

## 2019-08-09 RX ADMIN — CITALOPRAM HYDROBROMIDE 30 MG: 20 TABLET ORAL at 08:54

## 2019-08-09 RX ADMIN — BACLOFEN 30 MG: 20 TABLET ORAL at 21:16

## 2019-08-09 RX ADMIN — ATORVASTATIN CALCIUM 40 MG: 40 TABLET, FILM COATED ORAL at 21:16

## 2019-08-09 RX ADMIN — IPRATROPIUM BROMIDE AND ALBUTEROL SULFATE 3 ML: .5; 3 SOLUTION RESPIRATORY (INHALATION) at 19:04

## 2019-08-09 RX ADMIN — TAZOBACTAM SODIUM AND PIPERACILLIN SODIUM 3.38 G: 375; 3 INJECTION, SOLUTION INTRAVENOUS at 01:44

## 2019-08-09 RX ADMIN — POTASSIUM CHLORIDE 20 MEQ: 29.8 INJECTION, SOLUTION INTRAVENOUS at 11:10

## 2019-08-09 RX ADMIN — POTASSIUM CHLORIDE 20 MEQ: 29.8 INJECTION, SOLUTION INTRAVENOUS at 08:56

## 2019-08-09 RX ADMIN — ASPIRIN 81 MG: 81 TABLET ORAL at 08:54

## 2019-08-09 RX ADMIN — IPRATROPIUM BROMIDE AND ALBUTEROL SULFATE 3 ML: .5; 3 SOLUTION RESPIRATORY (INHALATION) at 16:05

## 2019-08-09 RX ADMIN — POTASSIUM CHLORIDE 20 MEQ: 29.8 INJECTION, SOLUTION INTRAVENOUS at 10:13

## 2019-08-09 RX ADMIN — IPRATROPIUM BROMIDE AND ALBUTEROL SULFATE 3 ML: .5; 3 SOLUTION RESPIRATORY (INHALATION) at 08:00

## 2019-08-09 RX ADMIN — ACETAMINOPHEN 975 MG: 325 TABLET, FILM COATED ORAL at 09:50

## 2019-08-09 RX ADMIN — TAZOBACTAM SODIUM AND PIPERACILLIN SODIUM 3.38 G: 375; 3 INJECTION, SOLUTION INTRAVENOUS at 21:17

## 2019-08-09 RX ADMIN — IPRATROPIUM BROMIDE AND ALBUTEROL SULFATE 3 ML: .5; 3 SOLUTION RESPIRATORY (INHALATION) at 23:21

## 2019-08-09 ASSESSMENT — ACTIVITIES OF DAILY LIVING (ADL)
ADLS_ACUITY_SCORE: 39
ADLS_ACUITY_SCORE: 38
ADLS_ACUITY_SCORE: 39
ADLS_ACUITY_SCORE: 38
ADLS_ACUITY_SCORE: 39
ADLS_ACUITY_SCORE: 38

## 2019-08-09 NOTE — PROGRESS NOTES
[facility]  Infectious Disease Progress Note          Assessment and Plan:   IMPRESSION:   1.  A 64-year-old male with acute sepsis, found to have Proteus bacteremia, same organism in the urine, almost certainly urosepsis, his primary diagnosis.   2.  Acute respiratory failure associated with the sepsis, not convincing pneumonia, at risk for aspiration, but doubt second lung infection, or at least different organism.   3.  Multiple sclerosis.   4.  History of prior aspiration.   5 MRSA colonization     RECOMMENDATIONS:   1.  Sens proteus Bc and UC, also enerococcus UC, given bacteremic with other UC org some coverage so to zosyn, if we are confident no resp infection could actually simplify all the way to ampiccillin   2.  Ongoing fever and sepsis in the early phase of pyelonephritis of this type with sepsis is not unusual, not indicative necessarily to have any treatment failure at this point. T now down,  O2 OK but some hypoxia, doubt second lung infection  3 I would do Iv zosyn thru WE(6 days IV), then if doing well Ok disposition another 8 days po augmentin 875 bid  4 Call if issues this WE             Interval History:   no new complaints sl sleepy, T down BC and UC same pansens proteus, also sens enterococcus UC  Much better T down ? Mentation baseline              Medications:       aspirin  81 mg Oral Daily     atorvastatin  40 mg Oral QPM     baclofen  30 mg Oral TID     citalopram  30 mg Oral Daily     ipratropium - albuterol 0.5 mg/2.5 mg/3 mL  3 mL Nebulization Q4H     metoprolol tartrate  12.5 mg Oral BID     [START ON 8/10/2019] modafinil  100 mg Oral Daily     piperacillin-tazobactam  3.375 g Intravenous Q6H     polyethylene glycol  17 g Oral Daily     senna-docusate  1 tablet Oral BID     sodium chloride (PF)  3 mL Intracatheter Q8H                  Physical Exam:   Blood pressure 125/78, pulse 90, temperature 98.7  F (37.1  C), temperature source Axillary, resp. rate 20,  weight 83.4 kg (183 lb 12.8 oz), SpO2 93 %.  Wt Readings from Last 2 Encounters:   08/09/19 83.4 kg (183 lb 12.8 oz)     Vital Signs with Ranges  Temp:  [97.6  F (36.4  C)-100  F (37.8  C)] 98.7  F (37.1  C)  Pulse:  [] 90  Heart Rate:  [] 87  Resp:  [8-21] 20  BP: (116-136)/(72-87) 125/78  SpO2:  [91 %-99 %] 93 %    Constitutional: Awake, sleepy, cooperative, no apparent distress   Lungs: Clear to auscultation bilaterally, no crackles or wheezing   Cardiovascular: Regular rate and rhythm, normal S1 and S2, and no murmur noted   Abdomen: Normal bowel sounds, soft, non-distended, non-tender   Skin: No rashes, no cyanosis, no edema neuro same   Other:           Data:   All microbiology laboratory data reviewed.  Recent Labs   Lab Test 08/09/19  0555 08/08/19  0630 08/07/19  0530   WBC 8.3 9.7 9.3   HGB 10.5* 10.8* 10.6*   HCT 31.6* 33.8* 33.6*   MCV 90 91 93   * 126* 124*     Recent Labs   Lab Test 08/09/19  0555 08/08/19  0630 08/07/19  0530   CR 0.47* 0.63* 0.70     No lab results found.  Recent Labs   Lab Test 08/06/19  2213 08/06/19  1608 08/06/19  1546 08/05/19  1955/19  1803 08/05/19  1657   CULT Culture in progress No growth after 3 days No growth after 3 days >100,000 colonies/mL  Enterococcus faecalis  *  >100,000 colonies/mL  Proteus mirabilis  * Cultured on the 1st day of incubation:  Proteus mirabilis  *  Critical Value/Significant Value, preliminary result only, called to and read back by  Morris Real RN [email or web address] @ 0529 8.6.19 JE    (Note)  POSITIVE for PROTEUS SPECIES by [employer] multiplex nucleic acid test.  Final identification and antimicrobial susceptibility testing will be  verified by standard methods.    Specimen tested with Verigene multiplex, gram-negative blood culture  nucleic acid test for the following targets: Acinetobacter sp.,  Citrobacter sp., Enterobacter sp., Proteus sp., E. coli, K.  pneumoniae/oxytoca, P. aeruginosa, and the following resistance  markers:  CTXM, KPC, NDM, VIM, IMP and OXA.    Critical Value/Significant Value called to and read back by  Morris Real RN @ 8092. 8/5/19. AMV   No growth after 4 days

## 2019-08-09 NOTE — PLAN OF CARE
VSS. Pt c/o headache 7/10 this shift. Relieved by prn tylenol. Pt on 8L by oxymask at start of shift. Down to 2L via oxymask after RT did some deep suctioning which cleared a lot of yellow copious secretions. Neuros intact. Lower extremities have slight contraction as well as right upper extremity. Pt alert and oriented x4, slow to respond. LS diminished. Tele SR. Cough is fair and productive. No SOB reported. Pt repositioned q2h. One loose stool this shift. Did not place rectal tube d/t pt only having 3 loose stools today. Continue to monitor stools, will pass on to next shift. Suprapubic catheter intact, some leakage of urine around insertion. Dressing changed. Meplix CDI on coccyx. Legs in boots this shift. PCDs on. Pt tolerated DD1 nectar thick diet well with taking pills. Continue with iv abx and monitor per POC.

## 2019-08-09 NOTE — PLAN OF CARE
Intermittently disoriented to time and place. VSS, afebrile, on RA while awake sats 93%. desats while sleeping to 88-89% uses oxymask 2L. Co 4/10 bilateral shoulder pain, Tylenol given x1 with good relief.  K+ 2.9, replaced recheck  3.7. Tele - SR.  SP cath with 200cc UOP. Multiple loose/brown stools, rectal tube placed. Pressure injury on coccyx new mepelix applied CDI. Repos q2h. DD1 with nectar thick liquids, meds crushed in apple sauce.

## 2019-08-09 NOTE — PROGRESS NOTES
SPIRITUAL HEALTH SERVICES Progress Note  [facility] S[facility] 3    Follow up per [facility] plan of care to inquire if pt has further questions or concerns related to healthcare directive.  Pt declined further support at this time.  Reminded pt of availability of [facility].    Plan: Spiritual Health Services remains available for additional emotional/spiritual support.    Peter Foster MA  Staff   Pager: [phone]  Phone: [phone]

## 2019-08-09 NOTE — PLAN OF CARE
Pt transferred from ICU 2245. VSS on 8 LPM oxymask. Denies pain and cp. Reports SOB. Handed off to next RN.

## 2019-08-09 NOTE — PROGRESS NOTES
VSS. Alert & oriented. C/o intermittent chest pain, EKG done, pain resolved by its self. Tele: ST. 8L O2 Oxymask; lung sounds diminished. LR infusing through PICC at rate of 10ml/hr. Ax2 to turn and reposition Q2hrs. Wounds on coccyx, rook boots on bilateral feet. Suprapubic catheter with good output.   Report given to Judy SOTO. Pt transferred to room 334 with pulsate mattress and belongings including glasses.

## 2019-08-09 NOTE — PROGRESS NOTES
"[facility] RCAT     Date: 8/9/19    Admission Dx: UTI    Pulmonary History: none on file    Home Nebulizer/MDI Use: none on file    Home Oxygen: none on file    Acuity Level (RCAT flow sheet): Level 2    Aerosol Therapy initiated: Continue current therapy Duoneb Q4 and Alb Q4prn    Pulmonary Hygiene initiated: continue orally and NT suction as needed.    Volume Expansion initiated: continue IS     Current Oxygen Requirements: Room Air    Current SpO2: 91-94%    Re-evaluation date: 8/12/19    Patient Education: continue to discuss with patient the indication, benefit and side effect of nebulizer.    See \"RT Assessments\" flow sheet for patient assessment scoring and Acuity Level Details.             "

## 2019-08-09 NOTE — PLAN OF CARE
"Discharge Planner SLP   Patient plan for discharge: did not discuss  Current status: Patient seen at am meal with pureed with nectar thick liquids. Patient's is very slow with feeding but likes to feed himself. Poor po intake. Consumed x1 bite of waffle (\"too sugary\"), ~5 teaspoons of oatmeal, and x6 sips of orange juice. No overt s/sx of aspiration. Contacted wife to get information about patient's baseline and swallowing history.     Recommend continuing with DDL1 with nectar thick liquids with the following strategies: upright for all po; remain upright for one hour after meals; small bites/sip; chin tuck with all swallows; alternate liquids and solids PRN. Patient may benefit from nutrition to monitor caloric intake. May benefit from smaller more frequent meals. At home, patient can take up to 2 1/2 hours for a meal.    Barriers to return to prior living situation: weakness, dysphagia  Recommendations for discharge: DEBBIE with home therapy  Rationale for recommendations: ST to follow to manage dysphagia (diet tolerance, ability to upgrade, strategy education).       Entered by: Oscar Whaley 08/09/2019 12:23 PM      "

## 2019-08-09 NOTE — PROVIDER NOTIFICATION
Patient was seen by speech 8/8 and started on DD1 nectar thick diet. Pt c/o headache but only has rectal Tylenol available. Going to place rectal tube for loose stools. Can we get PO Tylenol please? Thanks.

## 2019-08-09 NOTE — PROGRESS NOTES
[facility]  Hospitalist Progress Note  Galdino Raman MD 08/09/19    Reason for Stay (Diagnosis): Proteus bacteremia, UTI, encephalopathy         Assessment and Plan:      Summary of Stay: Wm Belcher is a 64 year old male with past medical history significant for multiple sclerosis, neurogenic bladder with chronic suprapubic catheter, dysphagia with previous PEG tube that is no longer in place, aspiration pneumonia, CAD with previous stenting, and MDD who was admitted on 8/5/2019 with fevers, chills, and cough initially to the medical floor then transferred to the ICU due to high fevers and changes in mental status consistent with acute encephalopathy.  He was started on ceftriaxone after urinalysis showed pyuria with moderate LE.  He was tachycardic with elevated lactic acidosis and fevers consistent with severe sepsis.  Did also receive Flagyl in case of aspiration pneumonia.  CT chest did not show sign of PE or infiltrate.  He later moved to the ICU due to worsening encephalopathy.  He received fluid resuscitation and then eventually some Lasix as he developed signs of hypervolemia.  Blood cultures positive for Proteus and urine culture positive for both Proteus and Enterococcus faecalis.  He was changed to cefepime.  ID was consulted and is now on Zosyn.  Mental status improving and transferred to medical floor.  SLP consulted and slowly advancing diet as able.  Adding back some home medications.    Problem List/Assessment and Plan:   Severe sepsis, Proteus bacteremia, UTI due to chronic indwelling suprapubic catheter: Presenting with high fevers up to 105 degrees.  Also tachycardic and elevated lactic acid 2.7.  He received IV fluid resuscitation along with initial ceftriaxone and azithromycin.  Change to cefepime after blood cultures growing Proteus.  Urine also growing Proteus along with Enterococcus faecalis.  -ID consulted, changed antibiotics to Zosyn for now  -Adequately fluid  resuscitated  -PICC line in place    Multiple sclerosis, neurogenic bladder: Has suprapubic catheter for neurogenic bladder.  Is wheelchair-bound at baseline and lives in long-term care facility.  Has baseline muscle contractures.  PTA it looks like he is on baclofen 30 mg 3 times daily and 30 mg daily as needed for muscle spasm and Tecfidera for MS.  -resume baclofen  -confirm if taking tecfidera then resume  -continue suprapubic cuevas cares    Dysphagia: In the past had a PEG tube which is no longer in place.  Previous aspiration pneumonia.  Suspect infectious source here is actually from his UTI and bacteremia.  He does have some occasional coarse breath sounds and is on a small amount of supplemental oxygen.  Initial CT chest did not show any infiltrates.  -SLP consulted, DD 1 with nectar thick liquids    CAD: Reviewing his care everywhere he did have a coronary angiogram in 2015 which showed 100% distal RCA stenosis and a VIANEY was placed.  Also with 40% LAD stenosis and some mild luminal irregularities in the left circumflex at that time.  He is on aspirin 81 mg daily, metoprolol, and atorvastatin.  He did have some intermittent chest pain on 8/8 and EKG at that time did not show any signs of ischemia.  -Resume 81 mg aspirin, metoprolol tartrate 12.5 mg twice daily, and atorvastatin    MDD: Resume PTA citalopram 30 mg daily    Loose stools: Suspect secondary to antibiotics.  He does not have any worsening leukocytosis, abdominal pain, and fevers have resolved so less likely C. difficile so have held off on testing.  Rectal tube if necessary to prevent skin takedown given his current bedbound status.    Hypokalemia: Suspect secondary to previous Lasix and now frequent loose stools.  - potassium replacement protocol    Acute anemia, thrombocytopenia: Hemoglobin down to 10.5 and platelet count 126.  Was on Lovenox for DVT prophylaxis that is now held.  Suspect some component of sepsis along with phlebotomy.  No  sign of acute bleeding.        DVT Prophylaxis: Pneumatic Compression Devices, heparin stopped due to worsening thrombocytopenia  Code Status: Full Code, this was discussed with the patient's primary decision-maker his ex-wife who confirms full CODE STATUS  FEN: SLP consulted.  DD1 with nectar thick liquids.  IVF at [facility]  Lines: Chronic suprapubic Marie catheter.  PICC line in place, likely removed tomorrow if clinically improving  Discharge Dispo: Has been holding his long-term care facility.  Wheelchair-bound due to MS.  Social work consulted.  Consult PT  Estimated Disch Date / # of Days until Disch: 2-3 days back to care facility        Interval History (Subjective):      Assumed care today after transfer from ICU due to improved condition.  Currently afebrile.  He denies any pain.  He does feel little bit short of breath and has small on oxygen on.  He is alert and answering questions, confused.                  Physical Exam:      Last Vital Signs:  /78 (BP Location: Right arm)   Pulse 90   Temp 98.7  F (37.1  C) (Axillary)   Resp 20   Wt 83.4 kg (183 lb 12.8 oz)   SpO2 93%       Intake/Output Summary (Last 24 hours) at 8/9/2019 1247  Last data filed at 8/9/2019 0647  Gross per 24 hour   Intake 284.83 ml   Output 1075 ml   Net -790.17 ml       Constitutional: Awake, NAD   Eyes: sclera white   HEENT:  MMM  Respiratory: Some expiratory wheeze bilaterally, no focal crackles  Cardiovascular: RRR.  No murmur   GI: non-tender, not distended, bowel sounds present  Genitourinary: Suprapubic catheter  Skin: no rash   Musculoskeletal/extremities: Some arm and leg contractions, trace to 1+ lower extremity edema  Neurologic: Alert, oriented to being in the hospital, slightly odd responses to some questions  Psychiatric: calm, cooperative          Medications:      All current medications were reviewed with changes reflected in problem list.         Data:      All new lab and imaging data was reviewed.    Labs:  Recent Labs   Lab 08/06/19  2213 08/06/19  1608 08/06/19  1546 08/05/19  1955/19  1803 08/05/19  1657   CULT Culture in progress No growth after 3 days No growth after 3 days >100,000 colonies/mL  Enterococcus faecalis  *  >100,000 colonies/mL  Proteus mirabilis  * Cultured on the 1st day of incubation:  Proteus mirabilis  *  Critical Value/Significant Value, preliminary result only, called to and read back by  Morris Real RN [email or web address] @ 0529 8.6.19 JE    (Note)  POSITIVE for PROTEUS SPECIES by [facility] multiplex nucleic acid test.  Final identification and antimicrobial susceptibility testing will be  verified by standard methods.    Specimen tested with Verigene multiplex, gram-negative blood culture  nucleic acid test for the following targets: Acinetobacter sp.,  Citrobacter sp., Enterobacter sp., Proteus sp., E. coli, K.  pneumoniae/oxytoca, P. aeruginosa, and the following resistance  markers: CTXM, KPC, NDM, VIM, IMP and OXA.    Critical Value/Significant Value called to and read back by  Morris Real RN @ 1755. 8/5/19. AMV   No growth after 4 days     Recent Labs   Lab 08/09/19  0555 08/08/19  1500 08/08/19  0630 08/07/19  0530     --  139  --  139   POTASSIUM 2.9* 3.5 2.9*   < > 3.9   CHLORIDE 108  --  106  --  110*   CO2 25  --  24  --  23   ANIONGAP 8  --  9  --  6   GLC 95  --  84  --  98   BUN 13  --  13  --  14   CR 0.47*  --  0.63*  --  0.70   GFRESTIMATED >90  --  >90  --  >90   GFRESTBLACK >90  --  >90  --  >90   KARTHIKEYAN 7.9*  --  8.2*  --  7.9*    < > = values in this interval not displayed.     Recent Labs   Lab 08/09/19  0555 08/08/19  0630 08/07/19  0530   WBC 8.3 9.7 9.3   HGB 10.5* 10.8* 10.6*   HCT 31.6* 33.8* 33.6*   MCV 90 91 93   * 126* 124*      Imaging:   Recent Results (from the past 24 hour(s))   XR Video Swallow w Esophagram    Narrative    VIDEO SWALLOW WITHOUT ESOPHAGRAM 8/8/2019 1:30 PM     HISTORY: Multiple sclerosis patient with dysphagia.     FLUOROSCOPY  TIME: 2.6 minutes.    IMAGES: A total of 11 spot fluoro and/or cine loops are obtained  during exam.    FINDINGS: Fluoroscopic assistance was provided to speech pathology for  evaluation of the patient's swallowing mechanism. Note that only the  cervical esophagus was evaluated.     The patient was able to swallow the varying consistencies of barium  but demonstrated premature spillage of all thicknesses of barium  contrast into the vallecula and piriform sinuses prior to the  initiation of the swallow. Patient exhibited aspiration with thin  barium liquid by spoon. Nectar consistency demonstrated repeat  swallows by spoon and cup with only one episode of aspiration using  nectar consistency by spoon. Honey, pudding and semisolid  consistencies are able to be swallowed without difficulty.      Impression    IMPRESSION: Aspiration with thin barium liquid and one episode of  aspiration with nectar consistency by spoon. Follow-up evaluation with  nectar consistency showed no additional episodes of aspiration. Please  see further details in report by speech pathology.    MD Galdino SIMPSON MD

## 2019-08-09 NOTE — PLAN OF CARE
Discharge Planner PT   Patient plan for discharge: Bed hold at LTC  Current status: PT: Orders received. Chart reviewed. Pt is WC bound at baseline, and does not complete transfers. Pt likes to feed self and participate in ADLs as able with UEs. Pt appears most appropriate for IP OT services at this time. MD paged for OT orders, will complete IPPT orders at this time.   Barriers to return to prior living situation: None  Recommendations for discharge: LTC  Rationale for recommendations: Is at/near baseline for mobility.        Entered by: Le Love 08/09/2019 3:08 PM

## 2019-08-10 ENCOUNTER — APPOINTMENT (OUTPATIENT)
Dept: OCCUPATIONAL THERAPY | Facility: CLINIC | Age: 64
DRG: 698 | End: 2019-08-10
Attending: INTERNAL MEDICINE
Payer: MEDICARE

## 2019-08-10 LAB
ANION GAP SERPL CALCULATED.3IONS-SCNC: 3 MMOL/L (ref 3–14)
BACTERIA SPEC CULT: ABNORMAL
BUN SERPL-MCNC: 10 MG/DL (ref 7–30)
CALCIUM SERPL-MCNC: 8.2 MG/DL (ref 8.5–10.1)
CHLORIDE SERPL-SCNC: 110 MMOL/L (ref 94–109)
CO2 SERPL-SCNC: 28 MMOL/L (ref 20–32)
CREAT SERPL-MCNC: 0.46 MG/DL (ref 0.66–1.25)
ERYTHROCYTE [DISTWIDTH] IN BLOOD BY AUTOMATED COUNT: 14.7 % (ref 10–15)
GFR SERPL CREATININE-BSD FRML MDRD: >90 ML/MIN/{1.73_M2}
GLUCOSE SERPL-MCNC: 99 MG/DL (ref 70–99)
HCT VFR BLD AUTO: 33 % (ref 40–53)
HGB BLD-MCNC: 10.8 G/DL (ref 13.3–17.7)
Lab: ABNORMAL
MAGNESIUM SERPL-MCNC: 1.5 MG/DL (ref 1.6–2.3)
MAGNESIUM SERPL-MCNC: 2.7 MG/DL (ref 1.6–2.3)
MCH RBC QN AUTO: 29.7 PG (ref 26.5–33)
MCHC RBC AUTO-ENTMCNC: 32.7 G/DL (ref 31.5–36.5)
MCV RBC AUTO: 91 FL (ref 78–100)
PLATELET # BLD AUTO: 174 10E9/L (ref 150–450)
POTASSIUM SERPL-SCNC: 3.2 MMOL/L (ref 3.4–5.3)
POTASSIUM SERPL-SCNC: 3.6 MMOL/L (ref 3.4–5.3)
RBC # BLD AUTO: 3.64 10E12/L (ref 4.4–5.9)
SODIUM SERPL-SCNC: 141 MMOL/L (ref 133–144)
SPECIMEN SOURCE: ABNORMAL
WBC # BLD AUTO: 9.3 10E9/L (ref 4–11)

## 2019-08-10 PROCEDURE — 97110 THERAPEUTIC EXERCISES: CPT | Mod: GO | Performed by: REHABILITATION PRACTITIONER

## 2019-08-10 PROCEDURE — 97165 OT EVAL LOW COMPLEX 30 MIN: CPT | Mod: GO | Performed by: REHABILITATION PRACTITIONER

## 2019-08-10 PROCEDURE — 83735 ASSAY OF MAGNESIUM: CPT | Performed by: INTERNAL MEDICINE

## 2019-08-10 PROCEDURE — 25000132 ZZH RX MED GY IP 250 OP 250 PS 637: Mod: GY | Performed by: INTERNAL MEDICINE

## 2019-08-10 PROCEDURE — 94640 AIRWAY INHALATION TREATMENT: CPT

## 2019-08-10 PROCEDURE — 80048 BASIC METABOLIC PNL TOTAL CA: CPT | Performed by: INTERNAL MEDICINE

## 2019-08-10 PROCEDURE — 25000125 ZZHC RX 250: Performed by: HOSPITALIST

## 2019-08-10 PROCEDURE — 25000128 H RX IP 250 OP 636: Performed by: INTERNAL MEDICINE

## 2019-08-10 PROCEDURE — 40000275 ZZH STATISTIC RCP TIME EA 10 MIN

## 2019-08-10 PROCEDURE — 12000000 ZZH R&B MED SURG/OB

## 2019-08-10 PROCEDURE — 84132 ASSAY OF SERUM POTASSIUM: CPT | Performed by: INTERNAL MEDICINE

## 2019-08-10 PROCEDURE — 85027 COMPLETE CBC AUTOMATED: CPT | Performed by: INTERNAL MEDICINE

## 2019-08-10 PROCEDURE — 94640 AIRWAY INHALATION TREATMENT: CPT | Mod: 76

## 2019-08-10 PROCEDURE — 25800030 ZZH RX IP 258 OP 636: Performed by: INTERNAL MEDICINE

## 2019-08-10 PROCEDURE — 99233 SBSQ HOSP IP/OBS HIGH 50: CPT | Performed by: INTERNAL MEDICINE

## 2019-08-10 RX ORDER — MAGNESIUM SULFATE HEPTAHYDRATE 40 MG/ML
4 INJECTION, SOLUTION INTRAVENOUS EVERY 4 HOURS PRN
Status: DISCONTINUED | OUTPATIENT
Start: 2019-08-10 | End: 2019-08-13 | Stop reason: HOSPADM

## 2019-08-10 RX ORDER — DIMETHYL FUMARATE 120 MG/1
240 CAPSULE ORAL 2 TIMES DAILY
Status: DISCONTINUED | OUTPATIENT
Start: 2019-08-10 | End: 2019-08-13 | Stop reason: HOSPADM

## 2019-08-10 RX ORDER — MIRABEGRON 25 MG/1
25 TABLET, FILM COATED, EXTENDED RELEASE ORAL DAILY
Status: DISCONTINUED | OUTPATIENT
Start: 2019-08-10 | End: 2019-08-13 | Stop reason: HOSPADM

## 2019-08-10 RX ORDER — TORSEMIDE 10 MG/1
40 TABLET ORAL
Status: DISCONTINUED | OUTPATIENT
Start: 2019-08-10 | End: 2019-08-10

## 2019-08-10 RX ORDER — FUROSEMIDE 10 MG/ML
60 INJECTION INTRAMUSCULAR; INTRAVENOUS ONCE
Status: COMPLETED | OUTPATIENT
Start: 2019-08-10 | End: 2019-08-10

## 2019-08-10 RX ORDER — LIDOCAINE 40 MG/G
CREAM TOPICAL
Status: DISCONTINUED | OUTPATIENT
Start: 2019-08-10 | End: 2019-08-13 | Stop reason: HOSPADM

## 2019-08-10 RX ADMIN — ATORVASTATIN CALCIUM 40 MG: 40 TABLET, FILM COATED ORAL at 22:20

## 2019-08-10 RX ADMIN — BACLOFEN 30 MG: 20 TABLET ORAL at 22:20

## 2019-08-10 RX ADMIN — Medication 12.5 MG: at 09:36

## 2019-08-10 RX ADMIN — BACLOFEN 30 MG: 20 TABLET ORAL at 17:03

## 2019-08-10 RX ADMIN — CITALOPRAM HYDROBROMIDE 30 MG: 20 TABLET ORAL at 09:36

## 2019-08-10 RX ADMIN — FUROSEMIDE 60 MG: 10 INJECTION, SOLUTION INTRAMUSCULAR; INTRAVENOUS at 11:50

## 2019-08-10 RX ADMIN — MODAFINIL 100 MG: 100 TABLET ORAL at 09:36

## 2019-08-10 RX ADMIN — MAGNESIUM SULFATE HEPTAHYDRATE 4 G: 40 INJECTION, SOLUTION INTRAVENOUS at 17:51

## 2019-08-10 RX ADMIN — TAZOBACTAM SODIUM AND PIPERACILLIN SODIUM 3.38 G: 375; 3 INJECTION, SOLUTION INTRAVENOUS at 17:03

## 2019-08-10 RX ADMIN — ALBUTEROL SULFATE 2.5 MG: 2.5 SOLUTION RESPIRATORY (INHALATION) at 23:23

## 2019-08-10 RX ADMIN — Medication 12.5 MG: at 22:21

## 2019-08-10 RX ADMIN — POTASSIUM CHLORIDE 20 MEQ: 29.8 INJECTION, SOLUTION INTRAVENOUS at 11:52

## 2019-08-10 RX ADMIN — IPRATROPIUM BROMIDE AND ALBUTEROL SULFATE 3 ML: .5; 3 SOLUTION RESPIRATORY (INHALATION) at 03:07

## 2019-08-10 RX ADMIN — IPRATROPIUM BROMIDE AND ALBUTEROL SULFATE 3 ML: .5; 3 SOLUTION RESPIRATORY (INHALATION) at 19:27

## 2019-08-10 RX ADMIN — ACETAMINOPHEN 975 MG: 325 TABLET, FILM COATED ORAL at 22:20

## 2019-08-10 RX ADMIN — ASPIRIN 81 MG: 81 TABLET ORAL at 09:36

## 2019-08-10 RX ADMIN — IPRATROPIUM BROMIDE AND ALBUTEROL SULFATE 3 ML: .5; 3 SOLUTION RESPIRATORY (INHALATION) at 12:06

## 2019-08-10 RX ADMIN — TAZOBACTAM SODIUM AND PIPERACILLIN SODIUM 3.38 G: 375; 3 INJECTION, SOLUTION INTRAVENOUS at 22:32

## 2019-08-10 RX ADMIN — TAZOBACTAM SODIUM AND PIPERACILLIN SODIUM 3.38 G: 375; 3 INJECTION, SOLUTION INTRAVENOUS at 02:53

## 2019-08-10 RX ADMIN — IPRATROPIUM BROMIDE AND ALBUTEROL SULFATE 3 ML: .5; 3 SOLUTION RESPIRATORY (INHALATION) at 15:46

## 2019-08-10 RX ADMIN — BACLOFEN 30 MG: 20 TABLET ORAL at 09:36

## 2019-08-10 RX ADMIN — POTASSIUM CHLORIDE 20 MEQ: 29.8 INJECTION, SOLUTION INTRAVENOUS at 13:40

## 2019-08-10 RX ADMIN — SODIUM CHLORIDE, POTASSIUM CHLORIDE, SODIUM LACTATE AND CALCIUM CHLORIDE: 600; 310; 30; 20 INJECTION, SOLUTION INTRAVENOUS at 03:33

## 2019-08-10 RX ADMIN — TAZOBACTAM SODIUM AND PIPERACILLIN SODIUM 3.38 G: 375; 3 INJECTION, SOLUTION INTRAVENOUS at 09:54

## 2019-08-10 RX ADMIN — IPRATROPIUM BROMIDE AND ALBUTEROL SULFATE 3 ML: .5; 3 SOLUTION RESPIRATORY (INHALATION) at 08:15

## 2019-08-10 ASSESSMENT — ACTIVITIES OF DAILY LIVING (ADL)
ADLS_ACUITY_SCORE: 39

## 2019-08-10 NOTE — PLAN OF CARE
Pt has had a good appetite, likes his apple juice. Denies pain, turned q 2 hours, dressing to coccyx, rectal tube in place without any stool, scant leakage around it, good uop from SP cath, PICC in right arm, Tele SR

## 2019-08-10 NOTE — PROGRESS NOTES
[facility]  Hospitalist Progress Note  Galdino Raman MD 08/10/19    Reason for Stay (Diagnosis): Proteus bacteremia, UTI, encephalopathy         Assessment and Plan:      Summary of Stay: Wm Belcher is a 64 year old male with past medical history significant for multiple sclerosis, neurogenic bladder with chronic suprapubic catheter, dysphagia with previous PEG tube that is no longer in place, aspiration pneumonia, CAD with previous stenting, and MDD who was admitted on 8/5/2019 with fevers, chills, and cough initially to the medical floor then transferred to the ICU due to high fevers and changes in mental status consistent with acute encephalopathy.  He was started on ceftriaxone after urinalysis showed pyuria with moderate LE.  He was tachycardic with elevated lactic acidosis and fevers consistent with severe sepsis.  Did also receive Flagyl in case of aspiration pneumonia.  CT chest did not show sign of PE or infiltrate.  He later moved to the ICU due to worsening encephalopathy.  He received fluid resuscitation and then eventually some Lasix as he developed signs of hypervolemia.  Blood cultures positive for Proteus and urine culture positive for both Proteus and Enterococcus faecalis.  He was changed to cefepime.  ID was consulted and is now on Zosyn.  Mental status improving and transferred to medical floor.  SLP consulted and slowly advancing diet as able.  Adding back some home medications.  Intermittently somnolent.  Overall improving.  Remove PICC line and rectal tube.  Replacing potassium.    Problem List/Assessment and Plan:   Severe sepsis, Proteus bacteremia, UTI due to chronic indwelling suprapubic catheter: Presenting with high fevers up to 105 degrees.  Also tachycardic and elevated lactic acid 2.7.  He received IV fluid resuscitation along with initial ceftriaxone and azithromycin.  Change to cefepime after blood cultures growing Proteus.  Urine also growing Proteus along  with Enterococcus faecalis.  -ID consulted, changed antibiotics to Zosyn for now, recommended continuing IV Zosyn for 6 days until 8/14 then changed to oral regimen if doing well  -Adequately fluid resuscitated  -Place PIV and remove PICC line given it was placed while patient was febrile and bacteremic    Multiple sclerosis, neurogenic bladder: Has suprapubic catheter for neurogenic bladder.  Is wheelchair-bound at baseline and lives in long-term care facility.  Has baseline muscle contractures.  PTA it looks like he is on baclofen 30 mg 3 times daily and 30 mg daily as needed for muscle spasm and Tecfidera for MS.  -resume baclofen  -Resume Tecfidera   -continue suprapubic cuevas cares    Dysphagia: In the past had a PEG tube which is no longer in place.  Previous aspiration pneumonia.  Suspect infectious source here is actually from his UTI and bacteremia.  He does have some occasional coarse breath sounds and is on a small amount of supplemental oxygen.  Initial CT chest did not show any infiltrates.  -SLP consulted, DD 1 with nectar thick liquids  -Has odynophagia and sore mouth and throat.  No obvious thrush or oral lesions.  Hurricaine spray ordered    CAD: Reviewing his care everywhere he did have a coronary angiogram in 2015 which showed 100% distal RCA stenosis and a VIANEY was placed.  Also with 40% LAD stenosis and some mild luminal irregularities in the left circumflex at that time.  He is on aspirin 81 mg daily, metoprolol, and atorvastatin.  He did have some intermittent chest pain on 8/8 and EKG at that time did not show any signs of ischemia.  -Resume 81 mg aspirin, metoprolol tartrate 12.5 mg twice daily, and atorvastatin    MDD: Resumed PTA citalopram 30 mg daily    Loose stools: Suspect secondary to antibiotics.  He does not have any worsening leukocytosis, abdominal pain, and fevers have resolved so less likely C. difficile so have held off on testing.  Seems to have resolved with stopping his PTA  "scheduled bowel regimen.  -Remove rectal tube  -Likely resume his home scheduled bowel regimen tomorrow for chronic constipation    Hypokalemia: Suspect secondary to Lasix and now frequent loose stools.  - potassium replacement protocol, recheck this afternoon as received some Lasix this morning    Acute anemia, thrombocytopenia: Hemoglobin down to 10 and platelet count was down to 126.  Was on Lovenox for DVT prophylaxis that is now held.  Suspect some component of sepsis along with phlebotomy.  No sign of acute bleeding.  Platelet count rising now.      DVT Prophylaxis: Pneumatic Compression Devices, heparin stopped due to worsening thrombocytopenia  Code Status: Full Code, previously had been DNR/DNI which is what his POLST states.  I spoke with him today and he requests a full CODE STATUS and that he says he is going to go \" kicking and screaming\"  FEN: SLP consulted.  DD1 with nectar thick liquids.  IVF at [facility]  Lines: Chronic suprapubic Marie catheter.  Remove PICC line and rectal tube  Discharge Dispo: Has been holding his long-term care facility.  Wheelchair-bound due to MS.  Social work consulted.  Consult OT for upper body therapy  Estimated Disch Date / # of Days until Disch: ID recommending IV Zosyn until Wednesday 8/14 then discharged on oral regimen if doing well        Interval History (Subjective):      Currently afebrile.  Intermittently quite somnolent.  Endorsing sore throat and odynophagia.  No shortness of breath.  Diarrhea resolved.                  Physical Exam:      Last Vital Signs:  /74 (BP Location: Right arm)   Pulse 90   Temp 98.2  F (36.8  C) (Axillary)   Resp 18   Wt 83.4 kg (183 lb 12.8 oz)   SpO2 93%       Intake/Output Summary (Last 24 hours) at 8/9/2019 1247  Last data filed at 8/9/2019 0647  Gross per 24 hour   Intake 284.83 ml   Output 1075 ml   Net -790.17 ml       Constitutional: Awake, NAD   Eyes: sclera white   HEENT:  MMM  Respiratory: Some expiratory wheeze " bilaterally, few basilar crackles heard  Cardiovascular: RRR.  No murmur   GI: non-tender, not distended, bowel sounds present  Genitourinary: Suprapubic catheter  Skin: no rash   Musculoskeletal/extremities: Some arm and leg contractions, 1+ lower extremity edema  Neurologic: Alert, oriented to being in the hospital, slightly odd responses to some questions  Psychiatric: calm, cooperative          Medications:      All current medications were reviewed with changes reflected in problem list.         Data:      All new lab and imaging data was reviewed.   Labs:  Recent Labs   Lab 08/06/19  2213 08/06/19  1608 08/06/19  1546 08/05/19  1955/19  1803 08/05/19  1657   CULT No MRSA isolated: susceptibilities not available. PCR assay is more sensitive than   culture.  * No growth after 4 days No growth after 4 days >100,000 colonies/mL  Enterococcus faecalis  *  >100,000 colonies/mL  Proteus mirabilis  * Cultured on the 1st day of incubation:  Proteus mirabilis  *  Critical Value/Significant Value, preliminary result only, called to and read back by  Morris Real RN [email or web address] @ 0529 8.6.19 JE    (Note)  POSITIVE for PROTEUS SPECIES by [employer]igene multiplex nucleic acid test.  Final identification and antimicrobial susceptibility testing will be  verified by standard methods.    Specimen tested with Verigene multiplex, gram-negative blood culture  nucleic acid test for the following targets: Acinetobacter sp.,  Citrobacter sp., Enterobacter sp., Proteus sp., E. coli, K.  pneumoniae/oxytoca, P. aeruginosa, and the following resistance  markers: CTXM, KPC, NDM, VIM, IMP and OXA.    Critical Value/Significant Value called to and read back by  Morris Real RN @ 1755. 8/5/19. AMV   No growth after 5 days     Recent Labs   Lab 08/10/19  0617 08/09/19  1327 08/09/19  0555  08/08/19  0630     --  141  --  139   POTASSIUM 3.2* 3.7 2.9*   < > 2.9*   CHLORIDE 110*  --  108  --  106   CO2 28  --  25  --  24   ANIONGAP 3  --  8   --  9   GLC 99  --  95  --  84   BUN 10  --  13  --  13   CR 0.46*  --  0.47*  --  0.63*   GFRESTIMATED >90  --  >90  --  >90   GFRESTBLACK >90  --  >90  --  >90   KARTHIKEYAN 8.2*  --  7.9*  --  8.2*    < > = values in this interval not displayed.     Recent Labs   Lab 08/10/19  0617 08/09/19  0555 08/08/19  0630   WBC 9.3 8.3 9.7   HGB 10.8* 10.5* 10.8*   HCT 33.0* 31.6* 33.8*   MCV 91 90 91    126* 126*      Imaging:   None today      Galdino Raman MD

## 2019-08-10 NOTE — PROGRESS NOTES
08/10/19 1401   Quick Adds   Type of Visit Initial Occupational Therapy Evaluation   Living Environment   Lives With facility resident   Living Arrangements assisted living   Home Accessibility no concerns   Transportation Anticipated agency   Self-Care   Usual Activity Tolerance fair   Current Activity Tolerance poor   Regular Exercise No   Equipment Currently Used at Home wheelchair, power;lift device   Activity/Exercise/Self-Care Comment patient reports close to total care at facility. Patient reports he has A with bathing, dresssing, transfers, however is able to coomplete light g/h cares and feed self. patient is R hand dominate, however R side has profound weakness and has been using L as dominate sied   Functional Level   Ambulation 4-->completely dependent   Transferring 4-->completely dependent   Toileting 4-->completely dependent   Bathing 4-->completely dependent   Dressing 4-->completely dependent  (pt. reports he can A with UE dressing)   Eating 1-->assistive equipment   Communication 2-->difficulty speaking (not related to language barrier)   Swallowing 0-->swallows foods/liquids without difficulty   Cognition 1 - attention or memory deficits   Fall history within last six months no   Which of the above functional risks had a recent onset or change? dressing   General Information   Onset of Illness/Injury or Date of Surgery - Date 08/05/19   Referring Physician Dr. Macdonald   Patient/Family Goals Statement not stated   Additional Occupational Profile Info/Pertinent History of Current Problem  mW Belcher is a 64 year old male with past medical history significant for multiple sclerosis, neurogenic bladder with chronic suprapubic catheter, dysphagia with previous PEG tube that is no longer in place, aspiration pneumonia, CAD with previous stenting, and MDD who was admitted on 8/5/2019 with fevers, chills, and cough initially to the medical floor then transferred to the ICU due to high fevers and  changes in mental status consistent with acute encephalopathy.  He was started on ceftriaxone after urinalysis showed pyuria with moderate LE.  He was tachycardic with elevated lactic acidosis and fevers consistent with severe sepsis.  Did also receive Flagyl in case of aspiration pneumonia.  CT chest did not show sign of PE or infiltrate.  He later moved to the ICU due to worsening encephalopathy.  He received fluid resuscitation and then eventually some Lasix as he developed signs of hypervolemia.  Blood cultures positive for Proteus and urine culture positive for both Proteus and Enterococcus faecalis.  He was changed to cefepime.  ID was consulted and is now on Zosyn.  Mental status improving and transferred to medical floor. Dx with severe sepsis.   General Observations patient was sleeping upon OT arrival, agreeable to limited activity   Cognitive Status Examination   Orientation person   Level of Consciousness lethargic/somnolent   Follows Commands (Cognition) follows one step commands;25-49% accuracy   Attention No deficits were identified   Cognitive Comment patient was slepping, has difficult with prolonged speech, unable to fully assess cognitive status, however it appears intact. CHart indicated patient has memort deficits, will monitor   Integumentary/Edema   Integumentary/Edema no deficits were identifed   Range of Motion (ROM)   ROM Comment limited R shoulder motion with AAROM and PROM   Strength   Strength Comments MMT was not completed, patient demonstrating profound weakness in R UE, L stronger then R, however still very weak- difficulty with mvmt against gravity   Hand Strength   Hand Strength Comments L>R, suspect R is weaker then L at baseline. Unsure where currect strength compares to baseline   Muscle Tone Assessment   Muscle Tone Comments increased tone throughout B UE, R greater then l   Coordination   Coordination Comments poor FMC at this time, unsure of baseline   Mobility   Bed Mobility  Comments NT   Transfer Skills   Transfer Comments dependent on lift at baseline- NA   Transfer Skill: Bed to Chair/Chair to Bed   Level of [place]: Bed to Chair unable to perform   Transfer Skill: Sit to Stand   Level of [place]: Sit/Stand unable to perform   Transfer Skill: Toilet Transfer   Level of [place]: Toilet dependent (less than 25% patients effort)   Tub/Shower Transfer   Tub/Shower Transfer Comments dependent   Bathing   Level of [place] dependent (less than 25% patients effort)   Upper Body Dressing   Level of [place]: Dress Upper Body dependent (less than 25% patients effort)   Lower Body Dressing   Level of [place]: Dress Lower Body dependent (less than 25% patients effort)   Toileting   Level of [place]: Toilet dependent (less than 25% patients effort)   Grooming   Level of [place]: Grooming   (suspect not at baseline, however unsure of baseline level)   Eating/Self Feeding   Level of [place]: Eating   (able to feed self per patient report, below basline currentl)   Instrumental Activities of Daily Living (IADL)   IADL Comments completed at facility   Activities of Daily Living Analysis   Impairments Contributing to Impaired Activities of Daily Living strength decreased   General Therapy Interventions   Planned Therapy Interventions ADL retraining;progressive activity/exercise;strengthening;stretching   Clinical Impression   Criteria for Skilled Therapeutic Interventions Met yes, treatment indicated   OT Diagnosis decreased ADL's   Influenced by the following impairments strength decreased   Assessment of Occupational Performance 1-3 Performance Deficits   Identified Performance Deficits feeding and light g/h cares   Clinical Decision Making (Complexity) Low complexity   Therapy Frequency 4x/week   Predicted Duration of Therapy Intervention (days/wks) 1 week   Anticipated Discharge Disposition Home with Home Therapy   Risks and Benefits of Treatment have  "been explained. Yes   Patient, Family & other staff in agreement with plan of care Yes   [facility] TM \"6 Clicks\"   2016, Trustees of [facility], under license to [employer].  All rights reserved.   6 Clicks Short Forms Daily Activity Inpatient Short Form   [facility] AM-PAC  \"6 Clicks\" Daily Activity Inpatient Short Form   1. Putting on and taking off regular lower body clothing? 1 - Total   2. Bathing (including washing, rinsing, drying)? 1 - Total   3. Toileting, which includes using toilet, bedpan or urinal? 1 - Total   4. Putting on and taking off regular upper body clothing? 1 - Total   5. Taking care of personal grooming such as brushing teeth? 2 - A Lot   6. Eating meals? 2 - A Lot   Daily Activity Raw Score (Score out of 24.Lower scores equate to lower levels of function) 8   Total Evaluation Time   Total Evaluation Time (Minutes) 15     "

## 2019-08-10 NOTE — PLAN OF CARE
Attempted to see patient at scheduled treatment time. Patient's tray had arrived and when asked by nursing, patient stated he wanted to eat. Upon arrival, patient was noted to be very sleepy and unable to maintain adequate alertness level to safely take po.    Checked back with patient and RN stated that staff had just fed him lunch so patient no interested in further po trials at this time. Will reschedule for next date.

## 2019-08-10 NOTE — PLAN OF CARE
Pt admit sepsis secondary to UTI. PT has chronic suprapubic due to neurogenic bladder.  Pt has hx of MS. Lift at baseline. Pt does have movement to left arm. Contracture to the other extremities. Pt q2hr turn. Tele SR. VSS ex 02 90 91 RA in the night. O2 1L oxymask applied stat 95. can likely be removed in the day. Rectal tube in place for frequent loose stool from antibiotic per MD note. Small output in rectal tube. Pt has a bed hold at [facility]. Left PIC that may possibly be removed today per MD note. Day shift nurse notified. Anticipate 1-2 day till discharge. Will continue to monitor.

## 2019-08-10 NOTE — PLAN OF CARE
VSS. Tele SR. Denies pain. Sleepy most of this shift. K 3.2 this morning - currently being replaced with recheck after replacement. Lift. Continuing zosyn. Plan: continue current plan of care.

## 2019-08-10 NOTE — PLAN OF CARE
OT- eval completed and treatment initiated.  Patient is a 64 year old male with past medical history significant for multiple sclerosis, neurogenic bladder with chronic suprapubic catheter, dysphagia with previous PEG tube that is no longer in place, aspiration pneumonia, CAD with previous stenting, and MDD who was admitted on 8/5/2019 with fevers, chills, and cough initially to the medical floor then transferred to the ICU due to high fevers and changes in mental status consistent with acute encephalopathy.  He was started on ceftriaxone after urinalysis showed pyuria with moderate LE.  He was tachycardic with elevated lactic acidosis and fevers consistent with severe sepsis.  Did also receive Flagyl in case of aspiration pneumonia.  CT chest did not show sign of PE or infiltrate.  He later moved to the ICU due to worsening encephalopathy.  He received fluid resuscitation and then eventually some Lasix as he developed signs of hypervolemia.  Blood cultures positive for Proteus and urine culture positive for both Proteus and Enterococcus faecalis.  He was changed to cefepime.  ID was consulted and is now on Zosyn.  Mental status improving and transferred to medical floor. Dx with severe sepsis.    Prior to admission, patient was resident at [facility]. Patient reports close to total care at facility. Patient reports he has A with bathing, dresssing, transfers (uses lift) and power chair for mobility, however is able to coomplete light g/h cares and feed self. patient is R hand dominate, however R side has profound weakness and has been using L as dominate side.    Discharge Planner OT   Patient plan for discharge: not stated  Current status: Patient sleeping upon OT arrival, agreeable to light B UE exercises. Heavy AAROM for R UE motions for 5-8 reps per motion, AAROM to L UE, increase strength observed however also noted fatigue sets in quickly and rest breaks or change of motion needed. Patient resumed sleeping after  session.  Barriers to return to prior living situation: below baseline for basic ADl's, B UE weakness  Recommendations for discharge: home to long-term with continued support for all ADL's and transfers, home OT.  Rationale for recommendations: home OT recommended for UE strengthening to aid strengthening and recovery to resume I with feeding and light grooming cares. Will continue with IP.       Entered by: Isela Rodriguez 08/10/2019 2:27 PM

## 2019-08-11 ENCOUNTER — APPOINTMENT (OUTPATIENT)
Dept: OCCUPATIONAL THERAPY | Facility: CLINIC | Age: 64
DRG: 698 | End: 2019-08-11
Payer: MEDICARE

## 2019-08-11 ENCOUNTER — APPOINTMENT (OUTPATIENT)
Dept: SPEECH THERAPY | Facility: CLINIC | Age: 64
DRG: 698 | End: 2019-08-11
Payer: MEDICARE

## 2019-08-11 LAB
ANION GAP SERPL CALCULATED.3IONS-SCNC: 3 MMOL/L (ref 3–14)
BACTERIA SPEC CULT: NO GROWTH
BUN SERPL-MCNC: 10 MG/DL (ref 7–30)
CALCIUM SERPL-MCNC: 8.3 MG/DL (ref 8.5–10.1)
CHLORIDE SERPL-SCNC: 107 MMOL/L (ref 94–109)
CO2 SERPL-SCNC: 30 MMOL/L (ref 20–32)
CREAT SERPL-MCNC: 0.51 MG/DL (ref 0.66–1.25)
ERYTHROCYTE [DISTWIDTH] IN BLOOD BY AUTOMATED COUNT: 14.9 % (ref 10–15)
GFR SERPL CREATININE-BSD FRML MDRD: >90 ML/MIN/{1.73_M2}
GLUCOSE SERPL-MCNC: 105 MG/DL (ref 70–99)
HCT VFR BLD AUTO: 34.6 % (ref 40–53)
HGB BLD-MCNC: 11.2 G/DL (ref 13.3–17.7)
Lab: NORMAL
MAGNESIUM SERPL-MCNC: 2.2 MG/DL (ref 1.6–2.3)
MCH RBC QN AUTO: 28.9 PG (ref 26.5–33)
MCHC RBC AUTO-ENTMCNC: 32.4 G/DL (ref 31.5–36.5)
MCV RBC AUTO: 89 FL (ref 78–100)
PLATELET # BLD AUTO: 210 10E9/L (ref 150–450)
POTASSIUM SERPL-SCNC: 3.2 MMOL/L (ref 3.4–5.3)
POTASSIUM SERPL-SCNC: 4 MMOL/L (ref 3.4–5.3)
RBC # BLD AUTO: 3.87 10E12/L (ref 4.4–5.9)
SODIUM SERPL-SCNC: 140 MMOL/L (ref 133–144)
SPECIMEN SOURCE: NORMAL
WBC # BLD AUTO: 7.6 10E9/L (ref 4–11)

## 2019-08-11 PROCEDURE — 36415 COLL VENOUS BLD VENIPUNCTURE: CPT | Performed by: INTERNAL MEDICINE

## 2019-08-11 PROCEDURE — 94640 AIRWAY INHALATION TREATMENT: CPT | Mod: 76

## 2019-08-11 PROCEDURE — 40000274 ZZH STATISTIC RCP CONSULT EA 30 MIN

## 2019-08-11 PROCEDURE — 40000275 ZZH STATISTIC RCP TIME EA 10 MIN

## 2019-08-11 PROCEDURE — 97535 SELF CARE MNGMENT TRAINING: CPT | Mod: GO | Performed by: REHABILITATION PRACTITIONER

## 2019-08-11 PROCEDURE — 85027 COMPLETE CBC AUTOMATED: CPT | Performed by: INTERNAL MEDICINE

## 2019-08-11 PROCEDURE — 83735 ASSAY OF MAGNESIUM: CPT | Performed by: INTERNAL MEDICINE

## 2019-08-11 PROCEDURE — 92526 ORAL FUNCTION THERAPY: CPT | Mod: GN | Performed by: SPEECH-LANGUAGE PATHOLOGIST

## 2019-08-11 PROCEDURE — 25000125 ZZHC RX 250: Performed by: INTERNAL MEDICINE

## 2019-08-11 PROCEDURE — 25000128 H RX IP 250 OP 636: Performed by: INTERNAL MEDICINE

## 2019-08-11 PROCEDURE — 80048 BASIC METABOLIC PNL TOTAL CA: CPT | Performed by: INTERNAL MEDICINE

## 2019-08-11 PROCEDURE — 94640 AIRWAY INHALATION TREATMENT: CPT

## 2019-08-11 PROCEDURE — 84132 ASSAY OF SERUM POTASSIUM: CPT | Performed by: INTERNAL MEDICINE

## 2019-08-11 PROCEDURE — 99232 SBSQ HOSP IP/OBS MODERATE 35: CPT | Performed by: INTERNAL MEDICINE

## 2019-08-11 PROCEDURE — 12000000 ZZH R&B MED SURG/OB

## 2019-08-11 PROCEDURE — 25000132 ZZH RX MED GY IP 250 OP 250 PS 637: Mod: GY | Performed by: INTERNAL MEDICINE

## 2019-08-11 PROCEDURE — 25000125 ZZHC RX 250: Performed by: HOSPITALIST

## 2019-08-11 RX ORDER — IPRATROPIUM BROMIDE AND ALBUTEROL SULFATE 2.5; .5 MG/3ML; MG/3ML
3 SOLUTION RESPIRATORY (INHALATION)
Status: DISCONTINUED | OUTPATIENT
Start: 2019-08-11 | End: 2019-08-12

## 2019-08-11 RX ADMIN — ENOXAPARIN SODIUM 40 MG: 40 INJECTION SUBCUTANEOUS at 11:51

## 2019-08-11 RX ADMIN — IPRATROPIUM BROMIDE AND ALBUTEROL SULFATE 3 ML: .5; 3 SOLUTION RESPIRATORY (INHALATION) at 12:00

## 2019-08-11 RX ADMIN — TAZOBACTAM SODIUM AND PIPERACILLIN SODIUM 3.38 G: 375; 3 INJECTION, SOLUTION INTRAVENOUS at 16:44

## 2019-08-11 RX ADMIN — TAZOBACTAM SODIUM AND PIPERACILLIN SODIUM 3.38 G: 375; 3 INJECTION, SOLUTION INTRAVENOUS at 21:38

## 2019-08-11 RX ADMIN — DIMETHYL FUMARATE 240 MG: 120 CAPSULE ORAL at 20:04

## 2019-08-11 RX ADMIN — TAZOBACTAM SODIUM AND PIPERACILLIN SODIUM 3.38 G: 375; 3 INJECTION, SOLUTION INTRAVENOUS at 04:13

## 2019-08-11 RX ADMIN — Medication 10 MEQ: at 15:19

## 2019-08-11 RX ADMIN — Medication 10 MEQ: at 18:56

## 2019-08-11 RX ADMIN — IPRATROPIUM BROMIDE AND ALBUTEROL SULFATE 3 ML: .5; 3 SOLUTION RESPIRATORY (INHALATION) at 23:21

## 2019-08-11 RX ADMIN — Medication 10 MEQ: at 16:45

## 2019-08-11 RX ADMIN — BACLOFEN 30 MG: 20 TABLET ORAL at 21:38

## 2019-08-11 RX ADMIN — Medication 10 MEQ: at 17:55

## 2019-08-11 RX ADMIN — CITALOPRAM HYDROBROMIDE 30 MG: 20 TABLET ORAL at 10:13

## 2019-08-11 RX ADMIN — ASPIRIN 81 MG: 81 TABLET ORAL at 10:13

## 2019-08-11 RX ADMIN — ATORVASTATIN CALCIUM 40 MG: 40 TABLET, FILM COATED ORAL at 20:04

## 2019-08-11 RX ADMIN — MODAFINIL 100 MG: 100 TABLET ORAL at 10:13

## 2019-08-11 RX ADMIN — IPRATROPIUM BROMIDE AND ALBUTEROL SULFATE 3 ML: .5; 3 SOLUTION RESPIRATORY (INHALATION) at 03:12

## 2019-08-11 RX ADMIN — IPRATROPIUM BROMIDE AND ALBUTEROL SULFATE 3 ML: .5; 3 SOLUTION RESPIRATORY (INHALATION) at 15:53

## 2019-08-11 RX ADMIN — Medication 12.5 MG: at 10:13

## 2019-08-11 RX ADMIN — IPRATROPIUM BROMIDE AND ALBUTEROL SULFATE 3 ML: .5; 3 SOLUTION RESPIRATORY (INHALATION) at 19:31

## 2019-08-11 RX ADMIN — TAZOBACTAM SODIUM AND PIPERACILLIN SODIUM 3.38 G: 375; 3 INJECTION, SOLUTION INTRAVENOUS at 10:12

## 2019-08-11 RX ADMIN — BACLOFEN 30 MG: 20 TABLET ORAL at 16:35

## 2019-08-11 RX ADMIN — BACLOFEN 30 MG: 20 TABLET ORAL at 10:12

## 2019-08-11 RX ADMIN — MIRABEGRON 25 MG: 25 TABLET, FILM COATED, EXTENDED RELEASE ORAL at 10:12

## 2019-08-11 RX ADMIN — IPRATROPIUM BROMIDE AND ALBUTEROL SULFATE 3 ML: .5; 3 SOLUTION RESPIRATORY (INHALATION) at 08:14

## 2019-08-11 RX ADMIN — TOPICAL ANESTHETIC 0.5 ML: 200 SPRAY DENTAL; PERIODONTAL at 11:26

## 2019-08-11 RX ADMIN — DIMETHYL FUMARATE 240 MG: 120 CAPSULE ORAL at 11:07

## 2019-08-11 ASSESSMENT — ACTIVITIES OF DAILY LIVING (ADL)
ADLS_ACUITY_SCORE: 39

## 2019-08-11 NOTE — PLAN OF CARE
A&Ox4. Afebrile this shift. Diet - DD1, nectar thick. Up - Ax2 with a lift. Turn q 2. IVF LR 10mL/hr. Abx - Zosyn. PICC line removed. Hurricane spray given for sore throat. Potassium replacement initiated per protocol. One loose stool this shift. Tele - SR. Possible discharge on 8/14 once IV zosyn regimen is completed.

## 2019-08-11 NOTE — PROGRESS NOTES
[facility]  Hospitalist Progress Note  Galdino Raman MD 08/11/19    Reason for Stay (Diagnosis): Proteus bacteremia, UTI, encephalopathy         Assessment and Plan:      Summary of Stay: Wm Belcher is a 64 year old male with past medical history significant for multiple sclerosis, neurogenic bladder with chronic suprapubic catheter, dysphagia with previous PEG tube that is no longer in place, aspiration pneumonia, CAD with previous stenting, and MDD who was admitted on 8/5/2019 with fevers, chills, and cough initially to the medical floor then transferred to the ICU due to high fevers and changes in mental status consistent with acute encephalopathy.  He was started on ceftriaxone after urinalysis showed pyuria with moderate LE.  He was tachycardic with elevated lactic acidosis and fevers consistent with severe sepsis.  Did also receive Flagyl in case of aspiration pneumonia.  CT chest did not show sign of PE or infiltrate.  He later moved to the ICU due to worsening encephalopathy.  He received fluid resuscitation and then eventually some Lasix as he developed signs of hypervolemia.  Blood cultures positive for Proteus and urine culture positive for both Proteus and Enterococcus faecalis.  He was changed to cefepime.  ID was consulted and is now on Zosyn.  Mental status improving and transferred to medical floor.  SLP consulted and slowly advancing diet as able.  Adding back some home medications.  Intermittently somnolent.  Overall improving.  Removed rectal tube with resolution of diarrhea.  Replaced potassium and magnesium.  Remove PICC line today.    Problem List/Assessment and Plan:   Severe sepsis, Proteus bacteremia, UTI due to chronic indwelling suprapubic catheter: Presenting with high fevers up to 105 degrees.  Also tachycardic and elevated lactic acid 2.7.  He received IV fluid resuscitation along with initial ceftriaxone and azithromycin.  Change to cefepime after blood cultures  growing Proteus.  Urine also growing Proteus along with Enterococcus faecalis.  -ID consulted, changed antibiotics to Zosyn for now, recommended continuing IV Zosyn for 6 days until 8/14 then changed to oral regimen if doing well  -Adequately fluid resuscitated  -Place PIV and remove PICC line today as it was placed while patient was febrile and bacteremic and anticipate transition to oral antibiotic regimen this week    Multiple sclerosis, neurogenic bladder: Has suprapubic catheter for neurogenic bladder.  Is wheelchair-bound at baseline and lives in long-term care facility.  Has baseline muscle contractures.  PTA it looks like he is on baclofen 30 mg 3 times daily and 30 mg daily as needed for muscle spasm and Tecfidera for MS.  -resume baclofen  -Resume Tecfidera   -continue suprapubic cuevas cares    Dysphagia: In the past had a PEG tube which is no longer in place.  Previous aspiration pneumonia.  Suspect infectious source here is actually from his UTI and bacteremia.  He does have some occasional coarse breath sounds and is on a small amount of supplemental oxygen.  Initial CT chest did not show any infiltrates.  -SLP consulted, DD 1 with nectar thick liquids  -Has odynophagia and sore mouth and throat.  No obvious thrush or oral lesions.  Hurricaine spray ordered as needed, will try today    CAD: Reviewing his care everywhere he did have a coronary angiogram in 2015 which showed 100% distal RCA stenosis and a VIANEY was placed.  Also with 40% LAD stenosis and some mild luminal irregularities in the left circumflex at that time.  He is on aspirin 81 mg daily, metoprolol, and atorvastatin.  He did have some intermittent chest pain on 8/8 and EKG at that time did not show any signs of ischemia.  -Resume 81 mg aspirin, metoprolol tartrate 12.5 mg twice daily, and atorvastatin    MDD: Resumed PTA citalopram 30 mg daily    Loose stools: Suspect secondary to antibiotics.  He does not have any worsening leukocytosis,  "abdominal pain, and fevers have resolved so less likely C. difficile so have held off on testing.  Seems to have resolved with stopping his PTA scheduled bowel regimen.  -Remove rectal tube  -Likely resume his home scheduled bowel regimen tomorrow for chronic constipation    Hypokalemia: Suspect secondary to Lasix and now frequent loose stools.  -potassium replacement protocol     Acute anemia, thrombocytopenia: Hemoglobin down to 10 and platelet count was down to 126.  Was on Lovenox for DVT prophylaxis that is now held.  Suspect some component of sepsis along with phlebotomy.  No sign of acute bleeding.  Platelet count rising now.      DVT Prophylaxis: Platelet count rising now so will resume lovenox for DVT prophylaxis and follow platelet count  Code Status: Full Code, previously had been DNR/DNI which is what his POLST states.  I spoke with him today and he requests a full CODE STATUS and that he says he is going to go \" kicking and screaming\"  FEN: SLP consulted.  DD1 with nectar thick liquids.  IVF at [facility]  Lines: Chronic suprapubic Marie catheter.  Rectal tube removed.  Remove PICC line    Discharge Dispo: Has been holding his long-term care facility.  Wheelchair-bound due to MS.  Social work consulted.  Consult OT for upper body therapy  Estimated Disch Date / # of Days until Disch: ID recommending IV Zosyn until Wednesday 8/14 then discharged on oral regimen if doing well        Interval History (Subjective):      Did have low-grade fever overnight to 100.2.  Now resolved.  Denies shortness of breath and is on room air.  Continues to have sore throat and mouth with swallowing.  Did not receive any of the hurricane spray that was ordered.  Potassium may need to be replaced this morning.  No diarrhea.  Intermittently lethargic.                  Physical Exam:      Last Vital Signs:  /66 (BP Location: Right arm)   Pulse 90   Temp 98.2  F (36.8  C) (Oral)   Resp 18   Wt 78.9 kg (174 lb)   SpO2 92% "       Intake/Output Summary (Last 24 hours) at 8/9/2019 1247  Last data filed at 8/9/2019 0647  Gross per 24 hour   Intake 284.83 ml   Output 1075 ml   Net -790.17 ml       Constitutional: Awake, NAD   Eyes: sclera white   HEENT:  MMM  Respiratory: Some expiratory wheeze bilaterally, few basilar crackles heard  Cardiovascular: RRR.  No murmur   GI: non-tender, not distended, bowel sounds present  Genitourinary: Suprapubic catheter  Skin: no rash   Musculoskeletal/extremities: Some arm and leg contractions, 1+ lower extremity edema  Neurologic: Alert, oriented to being in the hospital, slightly odd responses to some questions  Psychiatric: calm, cooperative          Medications:      All current medications were reviewed with changes reflected in problem list.         Data:      All new lab and imaging data was reviewed.   Labs:  Recent Labs   Lab 08/06/19  2213 08/06/19  1608 08/06/19  1546 08/05/19  1955/19  1803 08/05/19  1657   CULT No MRSA isolated: susceptibilities not available. PCR assay is more sensitive than   culture.  * No growth after 5 days No growth after 5 days >100,000 colonies/mL  Enterococcus faecalis  *  >100,000 colonies/mL  Proteus mirabilis  * Cultured on the 1st day of incubation:  Proteus mirabilis  *  Critical Value/Significant Value, preliminary result only, called to and read back by  Morris Real RN [email or web address] @ 0529 8.6.19 JE    (Note)  POSITIVE for PROTEUS SPECIES by Verigene multiplex nucleic acid test.  Final identification and antimicrobial susceptibility testing will be  verified by standard methods.    Specimen tested with Verigene multiplex, gram-negative blood culture  nucleic acid test for the following targets: Acinetobacter sp.,  Citrobacter sp., Enterobacter sp., Proteus sp., E. coli, K.  pneumoniae/oxytoca, P. aeruginosa, and the following resistance  markers: CTXM, KPC, NDM, VIM, IMP and OXA.    Critical Value/Significant Value called to and read back by  Morris Real  RN @ 1755. 8/5/19. AMV   No growth     Recent Labs   Lab 08/11/19  0700 08/10/19  1707 08/10/19  0617 08/09/19  0555     --  141  --  141   POTASSIUM 3.2* 3.6 3.2*   < > 2.9*   CHLORIDE 107  --  110*  --  108   CO2 30  --  28  --  25   ANIONGAP 3  --  3  --  8   *  --  99  --  95   BUN 10  --  10  --  13   CR 0.51*  --  0.46*  --  0.47*   GFRESTIMATED >90  --  >90  --  >90   GFRESTBLACK >90  --  >90  --  >90   KARTHIKEYAN 8.3*  --  8.2*  --  7.9*    < > = values in this interval not displayed.     Recent Labs   Lab 08/11/19  0700 08/10/19  0617 08/09/19  0555   WBC 7.6 9.3 8.3   HGB 11.2* 10.8* 10.5*   HCT 34.6* 33.0* 31.6*   MCV 89 91 90    174 126*      Imaging:   None today      Galdino Raman MD

## 2019-08-11 NOTE — PLAN OF CARE
Pt's magnesium was replaced, tolerating his diet, taking baclofen for spasms, Tele SR, turned q 2, skin looks good, mepilex to his coccyx,

## 2019-08-11 NOTE — PLAN OF CARE
Discharge Planner OT   Patient plan for discharge: not stated  Current status: After positioning patient and tray for optimal set-up, patient as min A with feeding. Required food to be placed on utensil and either handed to patient or placed on plate for patient to . Unable to maintain applesauce on spoon when trying to , however was able to maintain waffle on fork when picking up from plate. Able to I  and drink from cup, CGA to avoid tipping of cup when placing back on tray. Limited hand grasp strength to maintain prolonged grasp of cup, therefore encouraged placement of cup early to avoid spilling. Patient expressed desire to feed self. Spoke with Rn about how to encourage and provide to support to allow patient to feed self.   Barriers to return to prior living situation:  below baseline for basic ADl's, B UE weakness  Recommendations for discharge: [facility] with continued support for all ADL's and transfers, home OT.  Rationale for recommendations: home OT recommended for UE strengthening to aid strengthening and recovery to resume I with feeding and light grooming cares. Will continue with IP.       Entered by: Isela Rodriguez 08/11/2019 9:02 AM

## 2019-08-11 NOTE — PROGRESS NOTES
"                                                            [facility]    Date:8/11/2019  Admission Dx: Sepsis. Bacteremia, UTI, MS, Dysphagia   Pulmonary History: None  Home Nebulizer/MDI Use: Albuterol Q4 prn  Home Oxygen:none    Acuity Level (RCAT flow sheet): 2     Aerosol Therapy initiated: Duoneb Q4 W/A, Albuterol Q4 prn    Pulmonary Hygiene initiated: Deep breathe and cough    Volume Expansion initiated: IS    Current Oxygen Requirements: Room air     Current SpO2: 93%    Re-evaluation date: 14 August 2019    Patient Education: Patient informed of medication benefits and possible side effects.    See \"RT Assessments\" flow sheet for patient assessment scoring and Acuity Level Details.           Clare Meredith RRT  8/11/2019    "

## 2019-08-11 NOTE — PLAN OF CARE
Discharge Planner SLP   Patient plan for discharge: did not discuss  Current status: Patient seen at AM meal and much more alert than yesterday. Patient wanting to feed himself but required assistance putting food on spoon and then would bring it to his mouth. No overt s/sx of aspiration during meal. Consumed 100% of pureed waffle, applesauce, and orange juice. O2 remained around 90-91% during meal. Patient very slow eat meals and taking +10 seconds for mastication of pureed waffles. Due to delayed mastication and issues with fatigue, patient is not ready to advance at this time.     Recommend continue with Dysphagia Diet Level 1 (Puree) with NECTAR-thick liquids with the following safe swallowing strategies: upright for all po and remain upright for 30 minutes; small bites/sips; take a drink every 2-3 bites; check for oral residue. Due to fatigue, patient may benefit from smaller meals (2 food items and a beverage) and then have a snack between meals (I.e., magic cup/yogurt/pureed fruit and a beverage). Patient does need 1:1 assistance at meals for set up, placing food on spoon, and cueing for strategies.    Barriers to return to prior living situation: weakness, dysphagia, below baseline  Recommendations for discharge: return to [facility] with home health  Rationale for recommendations: continued ST to manage dysphagia (diet tolerance, ability to advance, strategy education)       Entered by: Oscar Whaley 08/11/2019 10:13 AM

## 2019-08-11 NOTE — PLAN OF CARE
A/Ox4, Mechanical lift Ax2 - wheelchair bound at baseline, VSS, Tele SR, DD1 with nectar thick liquids, suprapubic catheter positional and patent - good output this shift, contact isolation maintained for MRSA in the nares, Mag recheck 2.7 - no further replacement needed, PICC to L arm, turn and reposition Q2H mepilex to coccyx, continue with plan of care.

## 2019-08-12 ENCOUNTER — DOCUMENTATION ONLY (OUTPATIENT)
Dept: OTHER | Facility: CLINIC | Age: 64
End: 2019-08-12

## 2019-08-12 ENCOUNTER — AMBULATORY - HEALTHEAST (OUTPATIENT)
Dept: OTHER | Facility: CLINIC | Age: 64
End: 2019-08-12

## 2019-08-12 LAB
ANION GAP SERPL CALCULATED.3IONS-SCNC: 5 MMOL/L (ref 3–14)
BACTERIA SPEC CULT: NO GROWTH
BACTERIA SPEC CULT: NO GROWTH
BUN SERPL-MCNC: 8 MG/DL (ref 7–30)
CALCIUM SERPL-MCNC: 8.7 MG/DL (ref 8.5–10.1)
CHLORIDE SERPL-SCNC: 112 MMOL/L (ref 94–109)
CO2 SERPL-SCNC: 26 MMOL/L (ref 20–32)
CREAT SERPL-MCNC: 0.47 MG/DL (ref 0.66–1.25)
ERYTHROCYTE [DISTWIDTH] IN BLOOD BY AUTOMATED COUNT: 15.2 % (ref 10–15)
GFR SERPL CREATININE-BSD FRML MDRD: >90 ML/MIN/{1.73_M2}
GLUCOSE SERPL-MCNC: 131 MG/DL (ref 70–99)
HCT VFR BLD AUTO: 34.7 % (ref 40–53)
HGB BLD-MCNC: 11.1 G/DL (ref 13.3–17.7)
Lab: NORMAL
Lab: NORMAL
MCH RBC QN AUTO: 29 PG (ref 26.5–33)
MCHC RBC AUTO-ENTMCNC: 32 G/DL (ref 31.5–36.5)
MCV RBC AUTO: 91 FL (ref 78–100)
PLATELET # BLD AUTO: 262 10E9/L (ref 150–450)
POTASSIUM SERPL-SCNC: 3.8 MMOL/L (ref 3.4–5.3)
RBC # BLD AUTO: 3.83 10E12/L (ref 4.4–5.9)
SODIUM SERPL-SCNC: 143 MMOL/L (ref 133–144)
SPECIMEN SOURCE: NORMAL
SPECIMEN SOURCE: NORMAL
WBC # BLD AUTO: 7.7 10E9/L (ref 4–11)

## 2019-08-12 PROCEDURE — 94640 AIRWAY INHALATION TREATMENT: CPT

## 2019-08-12 PROCEDURE — 99232 SBSQ HOSP IP/OBS MODERATE 35: CPT | Performed by: INTERNAL MEDICINE

## 2019-08-12 PROCEDURE — 36415 COLL VENOUS BLD VENIPUNCTURE: CPT | Performed by: INTERNAL MEDICINE

## 2019-08-12 PROCEDURE — 25000125 ZZHC RX 250: Performed by: INTERNAL MEDICINE

## 2019-08-12 PROCEDURE — 12000000 ZZH R&B MED SURG/OB

## 2019-08-12 PROCEDURE — 40000275 ZZH STATISTIC RCP TIME EA 10 MIN

## 2019-08-12 PROCEDURE — 25000132 ZZH RX MED GY IP 250 OP 250 PS 637: Mod: GY | Performed by: INTERNAL MEDICINE

## 2019-08-12 PROCEDURE — 25000128 H RX IP 250 OP 636: Performed by: INTERNAL MEDICINE

## 2019-08-12 PROCEDURE — 80048 BASIC METABOLIC PNL TOTAL CA: CPT | Performed by: INTERNAL MEDICINE

## 2019-08-12 PROCEDURE — 85027 COMPLETE CBC AUTOMATED: CPT | Performed by: INTERNAL MEDICINE

## 2019-08-12 PROCEDURE — 94640 AIRWAY INHALATION TREATMENT: CPT | Mod: 76

## 2019-08-12 RX ORDER — IPRATROPIUM BROMIDE AND ALBUTEROL SULFATE 2.5; .5 MG/3ML; MG/3ML
3 SOLUTION RESPIRATORY (INHALATION) EVERY 4 HOURS PRN
Status: DISCONTINUED | OUTPATIENT
Start: 2019-08-12 | End: 2019-08-13 | Stop reason: HOSPADM

## 2019-08-12 RX ADMIN — CITALOPRAM HYDROBROMIDE 30 MG: 20 TABLET ORAL at 09:25

## 2019-08-12 RX ADMIN — BACLOFEN 30 MG: 20 TABLET ORAL at 22:01

## 2019-08-12 RX ADMIN — DIMETHYL FUMARATE 240 MG: 120 CAPSULE ORAL at 20:11

## 2019-08-12 RX ADMIN — ATORVASTATIN CALCIUM 40 MG: 40 TABLET, FILM COATED ORAL at 20:11

## 2019-08-12 RX ADMIN — DIMETHYL FUMARATE 240 MG: 120 CAPSULE ORAL at 09:26

## 2019-08-12 RX ADMIN — IPRATROPIUM BROMIDE AND ALBUTEROL SULFATE 3 ML: .5; 3 SOLUTION RESPIRATORY (INHALATION) at 08:01

## 2019-08-12 RX ADMIN — TAZOBACTAM SODIUM AND PIPERACILLIN SODIUM 3.38 G: 375; 3 INJECTION, SOLUTION INTRAVENOUS at 10:37

## 2019-08-12 RX ADMIN — TAZOBACTAM SODIUM AND PIPERACILLIN SODIUM 3.38 G: 375; 3 INJECTION, SOLUTION INTRAVENOUS at 05:33

## 2019-08-12 RX ADMIN — BACLOFEN 30 MG: 20 TABLET ORAL at 09:25

## 2019-08-12 RX ADMIN — TAZOBACTAM SODIUM AND PIPERACILLIN SODIUM 3.38 G: 375; 3 INJECTION, SOLUTION INTRAVENOUS at 16:43

## 2019-08-12 RX ADMIN — Medication 12.5 MG: at 09:25

## 2019-08-12 RX ADMIN — Medication 12.5 MG: at 20:11

## 2019-08-12 RX ADMIN — TAZOBACTAM SODIUM AND PIPERACILLIN SODIUM 3.38 G: 375; 3 INJECTION, SOLUTION INTRAVENOUS at 22:00

## 2019-08-12 RX ADMIN — MODAFINIL 100 MG: 100 TABLET ORAL at 09:25

## 2019-08-12 RX ADMIN — ASPIRIN 81 MG: 81 TABLET ORAL at 09:25

## 2019-08-12 RX ADMIN — BACLOFEN 30 MG: 20 TABLET ORAL at 16:43

## 2019-08-12 RX ADMIN — MIRABEGRON 25 MG: 25 TABLET, FILM COATED, EXTENDED RELEASE ORAL at 09:25

## 2019-08-12 RX ADMIN — ENOXAPARIN SODIUM 40 MG: 40 INJECTION SUBCUTANEOUS at 11:57

## 2019-08-12 RX ADMIN — IPRATROPIUM BROMIDE AND ALBUTEROL SULFATE 3 ML: .5; 3 SOLUTION RESPIRATORY (INHALATION) at 12:16

## 2019-08-12 ASSESSMENT — ACTIVITIES OF DAILY LIVING (ADL)
ADLS_ACUITY_SCORE: 39
ADLS_ACUITY_SCORE: 38

## 2019-08-12 NOTE — PLAN OF CARE
A/Ox4, Mechanical lift Ax2 - wheelchair bound at baseline, Q2H repositioning, VSS, Tele SR, DD1 with nectar thick liquids, suprapubic catheter positional and patent - good output this shift, contact isolation maintained for MRSA in the nares, PICC removed yesterday - PIV infusing LR at 10ml/hr, IV zosyn, mepilex to coccyx red/scabbing, plan to discharge on Wednesday once IV antibiotics complete and transitioned to PO, OT/SW/WOC/ID following, continue with plan of care.

## 2019-08-12 NOTE — PROGRESS NOTES
[facility]  Hospitalist Progress Note  Galdino Raman MD 08/12/19    Reason for Stay (Diagnosis): Proteus bacteremia, UTI, encephalopathy         Assessment and Plan:      Summary of Stay: Wm Belcher is a 64 year old male with past medical history significant for multiple sclerosis, neurogenic bladder with chronic suprapubic catheter, dysphagia with previous PEG tube that is no longer in place, aspiration pneumonia, CAD with previous stenting, and MDD who was admitted on 8/5/2019 with fevers, chills, and cough initially to the medical floor then transferred to the ICU due to high fevers and changes in mental status consistent with acute encephalopathy.  He was started on ceftriaxone after urinalysis showed pyuria with moderate LE.  He was tachycardic with elevated lactic acidosis and fevers consistent with severe sepsis.  Did also receive Flagyl in case of aspiration pneumonia.  CT chest did not show sign of PE or infiltrate.  He later moved to the ICU due to worsening encephalopathy.  He received fluid resuscitation and then eventually some Lasix as he developed signs of hypervolemia.  Blood cultures positive for Proteus and urine culture positive for both Proteus and Enterococcus faecalis.  He was changed to cefepime.  ID was consulted and is now on Zosyn.  Mental status improving and transferred to medical floor.  SLP consulted and slowly advancing diet as able.  Adding back some home medications.  Intermittently somnolent.  Overall improving.  Removed rectal tube with resolution of diarrhea.  Replaced potassium and magnesium.  Removed PICC.    Problem List/Assessment and Plan:   Severe sepsis, Proteus bacteremia, UTI due to chronic indwelling suprapubic catheter: Presenting with high fevers up to 105 degrees.  Also tachycardic and elevated lactic acid 2.7.  He received IV fluid resuscitation along with initial ceftriaxone and azithromycin.  Change to cefepime after blood cultures growing  Proteus.  Urine also growing Proteus along with Enterococcus faecalis.  -ID consulted, changed antibiotics to Zosyn for now, recommended continuing IV Zosyn for 6 days until 8/14 then changed to oral regimen if doing well  -Adequately fluid resuscitated  -Removed PICC as it was placed while patient was febrile and bacteremic and anticipate transition to oral antibiotic regimen this week    Multiple sclerosis, neurogenic bladder: Has suprapubic catheter for neurogenic bladder.  Is wheelchair-bound at baseline and lives in long-term care facility.  Has baseline muscle contractures.  PTA it looks like he is on baclofen 30 mg 3 times daily and 30 mg daily as needed for muscle spasm and Tecfidera for MS.  -resumed baclofen  -Resumed Tecfidera   -continued suprapubic cuevas cares    Dysphagia: In the past had a PEG tube which is no longer in place.  Previous aspiration pneumonia.  Suspect infectious source here is actually from his UTI and bacteremia.  He does have some occasional coarse breath sounds and is on a small amount of supplemental oxygen.  Initial CT chest did not show any infiltrates.  -SLP consulted, DD 1 with nectar thick liquids  -Has odynophagia and sore mouth and throat.  No obvious thrush or oral lesions.  Hurricaine spray ordered and this seems to be helping with the pain    CAD: Reviewing his care everywhere he did have a coronary angiogram in 2015 which showed 100% distal RCA stenosis and a VIANEY was placed.  Also with 40% LAD stenosis and some mild luminal irregularities in the left circumflex at that time.  He is on aspirin 81 mg daily, metoprolol, and atorvastatin.  He did have some intermittent chest pain on 8/8 and EKG at that time did not show any signs of ischemia.  -Resumed 81 mg aspirin, metoprolol tartrate 12.5 mg twice daily, and atorvastatin    MDD: Resumed PTA citalopram 30 mg daily    Loose stools in setting of chronic constipation: Suspect secondary to antibiotics.  He does not have any  "worsening leukocytosis, abdominal pain, and fevers have resolved so less likely C. difficile so have held off on testing.  Seems to have resolved with stopping his PTA scheduled bowel regimen.  -Seems to have resolved so continued rectal tube  -Still having 1-2 bowel movements per day so held off on resuming his scheduled PTA bowel regimen for chronic constipation    Hypokalemia: Suspect secondary to Lasix and loose stools.  Improved.  -potassium replacement protocol     Acute anemia, thrombocytopenia: Hemoglobin down to 10 and platelet count was down to 126.  Was on Lovenox for DVT prophylaxis that is now held.  Suspect some component of sepsis along with phlebotomy.  No sign of acute bleeding.  Platelet count rising now.      DVT Prophylaxis: Platelet count rising now so will resume lovenox for DVT prophylaxis and follow platelet count  Code Status: Full Code, previously had been DNR/DNI which is what his POLST states.  I spoke with him today and he requests a full CODE STATUS and that he says he is going to go \" kicking and screaming\"  FEN: SLP consulted.  DD1 with nectar thick liquids.  IVF at [facility]  Lines: Chronic suprapubic Marie catheter.  Rectal tube removed.  Remove PICC line    Discharge Dispo: Has been holding his long-term care facility.  Wheelchair-bound due to MS.  Social work consulted.  Consulted OT for upper body therapy while here  Estimated Disch Date / # of Days until Disch: ID recommending IV Zosyn until Wednesday 8/14 then discharged on oral regimen if doing well        Interval History (Subjective):      Low-grade temperature overnight, not as high as before, no return of diarrhea.  Denies shortness of breath.  Hurricaine spray helping sore throat.  Still coughing quite a bit.                  Physical Exam:      Last Vital Signs:  /65 (BP Location: Right arm)   Pulse 90   Temp 98  F (36.7  C) (Axillary)   Resp 18   Wt 78.9 kg (174 lb)   SpO2 92%     Intake/Output Summary (Last 24 " hours) at 8/12/2019 1123  Last data filed at 8/12/2019 0859  Gross per 24 hour   Intake 540 ml   Output 650 ml   Net -110 ml       Constitutional: Awake, NAD   Eyes: sclera white   HEENT:  MMM  Respiratory: No wheezing today, few right-sided crackles  Cardiovascular: RRR.  No murmur   GI: non-tender, not distended, bowel sounds present  Genitourinary: Suprapubic catheter  Skin: no rash   Musculoskeletal/extremities: Contractures present, 1+ lower extremity edema  Neurologic: Alert, oriented to being in the hospital, slight dysarthria  Psychiatric: calm, cooperative          Medications:      All current medications were reviewed with changes reflected in problem list.         Data:      All new lab and imaging data was reviewed.   Labs:  Recent Labs   Lab 08/06/19  2213 08/06/19  1608 08/06/19  1546 08/05/19  1955/19  1803 08/05/19  1657   CULT No MRSA isolated: susceptibilities not available. PCR assay is more sensitive than   culture.  * No growth No growth >100,000 colonies/mL  Enterococcus faecalis  *  >100,000 colonies/mL  Proteus mirabilis  * Cultured on the 1st day of incubation:  Proteus mirabilis  *  Critical Value/Significant Value, preliminary result only, called to and read back by  Morris Real RN [email or web address] @ 0529 8.6.19 JE    (Note)  POSITIVE for PROTEUS SPECIES by Verigene multiplex nucleic acid test.  Final identification and antimicrobial susceptibility testing will be  verified by standard methods.    Specimen tested with Verigene multiplex, gram-negative blood culture  nucleic acid test for the following targets: Acinetobacter sp.,  Citrobacter sp., Enterobacter sp., Proteus sp., E. coli, K.  pneumoniae/oxytoca, P. aeruginosa, and the following resistance  markers: CTXM, KPC, NDM, VIM, IMP and OXA.    Critical Value/Significant Value called to and read back by  Morris Real RN @ 1755. 8/5/19. AMV   No growth     Recent Labs   Lab 08/12/19  0645 08/11/19  2218 08/11/19  0700  08/10/19  0617      --  140  --  141   POTASSIUM 3.8 4.0 3.2*   < > 3.2*   CHLORIDE 112*  --  107  --  110*   CO2 26  --  30  --  28   ANIONGAP 5  --  3  --  3   *  --  105*  --  99   BUN 8  --  10  --  10   CR 0.47*  --  0.51*  --  0.46*   GFRESTIMATED >90  --  >90  --  >90   GFRESTBLACK >90  --  >90  --  >90   KARTHIKEYAN 8.7  --  8.3*  --  8.2*    < > = values in this interval not displayed.     Recent Labs   Lab 08/12/19  0645 08/11/19  0700 08/10/19  0617   WBC 7.7 7.6 9.3   HGB 11.1* 11.2* 10.8*   HCT 34.7* 34.6* 33.0*   MCV 91 89 91    210 174      Imaging:   None today      Galdino Raman MD

## 2019-08-12 NOTE — PLAN OF CARE
ORIENTATION: Disoriented to time  VS: Tachycardic   TELE: ST  LUNGS: 91% RA; Diminished  : Suprapubic Catheter  GI: Last BM 8/11/19; incontinent  IV: Saline Locked  PAIN: Pt c/o discomfort and was repositioned with relief  ACTIVITY: Assist x 2 w/ceiling lift  DIET: DD1 pureed with nectar thickened liquids  PLAN: Possible discharge on 8/14 once IV zosyn regimen is completed  OTHER: K+ replaced - recheck 4.0

## 2019-08-12 NOTE — PLAN OF CARE
A&Ox4, temp 98.8 axillary, VSS, HR tachy at times 104, tele SR/ST.  Denies pain.  Tolerating DD1 with nectar thick liquids, fair appetite, staff assist to feed.  Incontinent of bowel x2, Suprapubic catheter patent, good urine output.  Mepilex to coccyx, changed x2 due to incontinent stool, area redden, nonblanchable, scabbed/dry areas.  Turned and repositioned q2hrs. Up to recliner with assist 2, mechanical lift.  Protective boots on BLE's.   Able to use call light.    Continues on IV Zosyn as ordered.  Plan to transition to PO antibiotics per MD and return to prior LTC.  Ex wife, Babs, updated on plan with patients permission.    Neuros stable.  Will continue to monitor and continue with plan of care.

## 2019-08-12 NOTE — PROGRESS NOTES
[facility]  Infectious Disease Progress Note          Assessment and Plan:   IMPRESSION:   1.  A 64-year-old male with acute sepsis, found to have Proteus bacteremia, same organism in the urine, almost certainly urosepsis, his primary diagnosis.   2.  Acute respiratory failure associated with the sepsis, not convincing pneumonia, at risk for aspiration, but doubt second lung infection, or at least different organism.   3.  Multiple sclerosis.   4.  History of prior aspiration.   5 MRSA colonization     RECOMMENDATIONS:   1.  Sens proteus Bc and UC, also enerococcus UC, given bacteremic with other UC org some coverage so to zosyn, if we are confident no resp infection could actually simplify all the way to ampiccillin   2.  Ongoing fever and sepsis in the early phase of pyelonephritis of this type with sepsis is not unusual, not indicative necessarily to have any treatment failure at this point. T now down,  O2 OK but some hypoxia, doubt second lung infection  3 I would be Ok po augmentin another 1 week any time, zosyn while here             Interval History:   no new complaints sl sleepy, T down BC and UC same pansens proteus, also sens enterococcus UC  Much better T down ? Mentation baseline              Medications:       aspirin  81 mg Oral Daily     atorvastatin  40 mg Oral QPM     baclofen  30 mg Oral TID     citalopram  30 mg Oral Daily     dimethyl fumarate  240 mg Oral BID     enoxaparin  40 mg Subcutaneous Q24H     metoprolol tartrate  12.5 mg Oral BID     mirabegron  25 mg Oral Daily     modafinil  100 mg Oral Daily     piperacillin-tazobactam  3.375 g Intravenous Q6H     sodium chloride (PF)  3 mL Intracatheter Q8H                  Physical Exam:   Blood pressure 119/72, pulse 90, temperature 98.8  F (37.1  C), temperature source Axillary, resp. rate 20, weight 78.9 kg (174 lb), SpO2 91 %.  Wt Readings from Last 2 Encounters:   08/11/19 78.9 kg (174 lb)     Vital Signs with  Ranges  Temp:  [96.6  F (35.9  C)-98.8  F (37.1  C)] 98.8  F (37.1  C)  Heart Rate:  [] 104  Resp:  [16-20] 20  BP: (109-119)/(65-72) 119/72  SpO2:  [90 %-94 %] 91 %    Constitutional: Awake, sleepy, cooperative, no apparent distress   Lungs: Clear to auscultation bilaterally, no crackles or wheezing   Cardiovascular: Regular rate and rhythm, normal S1 and S2, and no murmur noted   Abdomen: Normal bowel sounds, soft, non-distended, non-tender   Skin: No rashes, no cyanosis, no edema neuro same   Other:           Data:   All microbiology laboratory data reviewed.  Recent Labs   Lab Test 08/12/19  0645 08/11/19  0700 08/10/19  0617   WBC 7.7 7.6 9.3   HGB 11.1* 11.2* 10.8*   HCT 34.7* 34.6* 33.0*   MCV 91 89 91    210 174     Recent Labs   Lab Test 08/12/19  0645 08/11/19  0700 08/10/19  0617   CR 0.47* 0.51* 0.46*     No lab results found.  Recent Labs   Lab Test 08/06/19  2213 08/06/19  1608 08/06/19  1546 08/05/19  1955/19  1803 08/05/19  1657   CULT No MRSA isolated: susceptibilities not available. PCR assay is more sensitive than   culture.  * No growth No growth >100,000 colonies/mL  Enterococcus faecalis  *  >100,000 colonies/mL  Proteus mirabilis  * Cultured on the 1st day of incubation:  Proteus mirabilis  *  Critical Value/Significant Value, preliminary result only, called to and read back by  Morris Real RN [email or web address] @ 0529 8.6.19 GABRIEL    (Note)  POSITIVE for PROTEUS SPECIES by [employer] multiplex nucleic acid test.  Final identification and antimicrobial susceptibility testing will be  verified by standard methods.    Specimen tested with Verigene multiplex, gram-negative blood culture  nucleic acid test for the following targets: Acinetobacter sp.,  Citrobacter sp., Enterobacter sp., Proteus sp., E. coli, K.  pneumoniae/oxytoca, P. aeruginosa, and the following resistance  markers: CTXM, KPC, NDM, VIM, IMP and OXA.    Critical Value/Significant Value called to and read back by  Morris  Farhan SOTO @ 1755. 8/5/19. AMV   No growth

## 2019-08-13 VITALS
WEIGHT: 171.2 LBS | OXYGEN SATURATION: 92 % | SYSTOLIC BLOOD PRESSURE: 143 MMHG | RESPIRATION RATE: 18 BRPM | HEART RATE: 90 BPM | DIASTOLIC BLOOD PRESSURE: 79 MMHG | TEMPERATURE: 98.7 F

## 2019-08-13 LAB
ANION GAP SERPL CALCULATED.3IONS-SCNC: 6 MMOL/L (ref 3–14)
BUN SERPL-MCNC: 8 MG/DL (ref 7–30)
CALCIUM SERPL-MCNC: 8.5 MG/DL (ref 8.5–10.1)
CHLORIDE SERPL-SCNC: 108 MMOL/L (ref 94–109)
CO2 SERPL-SCNC: 24 MMOL/L (ref 20–32)
CREAT SERPL-MCNC: 0.43 MG/DL (ref 0.66–1.25)
GFR SERPL CREATININE-BSD FRML MDRD: >90 ML/MIN/{1.73_M2}
GLUCOSE SERPL-MCNC: 96 MG/DL (ref 70–99)
POTASSIUM SERPL-SCNC: 3.5 MMOL/L (ref 3.4–5.3)
SODIUM SERPL-SCNC: 138 MMOL/L (ref 133–144)

## 2019-08-13 PROCEDURE — G0463 HOSPITAL OUTPT CLINIC VISIT: HCPCS

## 2019-08-13 PROCEDURE — 25000128 H RX IP 250 OP 636: Performed by: INTERNAL MEDICINE

## 2019-08-13 PROCEDURE — 36415 COLL VENOUS BLD VENIPUNCTURE: CPT | Performed by: INTERNAL MEDICINE

## 2019-08-13 PROCEDURE — 80048 BASIC METABOLIC PNL TOTAL CA: CPT | Performed by: INTERNAL MEDICINE

## 2019-08-13 PROCEDURE — 25000132 ZZH RX MED GY IP 250 OP 250 PS 637: Mod: GY | Performed by: INTERNAL MEDICINE

## 2019-08-13 PROCEDURE — 99239 HOSP IP/OBS DSCHRG MGMT >30: CPT | Performed by: INTERNAL MEDICINE

## 2019-08-13 RX ORDER — TRAMADOL HYDROCHLORIDE 50 MG/1
100 TABLET ORAL 3 TIMES DAILY
Qty: 15 TABLET | Refills: 0 | Status: SHIPPED | OUTPATIENT
Start: 2019-08-13

## 2019-08-13 RX ORDER — TRAMADOL HYDROCHLORIDE 50 MG/1
100 TABLET ORAL DAILY PRN
Qty: 5 TABLET | Refills: 0 | Status: SHIPPED | OUTPATIENT
Start: 2019-08-13

## 2019-08-13 RX ORDER — METOPROLOL TARTRATE 25 MG/1
12.5 TABLET, FILM COATED ORAL 2 TIMES DAILY
DISCHARGE
Start: 2019-08-13

## 2019-08-13 RX ADMIN — ENOXAPARIN SODIUM 40 MG: 40 INJECTION SUBCUTANEOUS at 11:05

## 2019-08-13 RX ADMIN — MODAFINIL 100 MG: 100 TABLET ORAL at 09:45

## 2019-08-13 RX ADMIN — DIMETHYL FUMARATE 240 MG: 120 CAPSULE ORAL at 09:46

## 2019-08-13 RX ADMIN — TAZOBACTAM SODIUM AND PIPERACILLIN SODIUM 3.38 G: 375; 3 INJECTION, SOLUTION INTRAVENOUS at 04:25

## 2019-08-13 RX ADMIN — BACLOFEN 30 MG: 20 TABLET ORAL at 09:45

## 2019-08-13 RX ADMIN — ASPIRIN 81 MG: 81 TABLET ORAL at 09:45

## 2019-08-13 RX ADMIN — TAZOBACTAM SODIUM AND PIPERACILLIN SODIUM 3.38 G: 375; 3 INJECTION, SOLUTION INTRAVENOUS at 09:57

## 2019-08-13 RX ADMIN — Medication 12.5 MG: at 09:45

## 2019-08-13 RX ADMIN — CITALOPRAM HYDROBROMIDE 30 MG: 20 TABLET ORAL at 09:45

## 2019-08-13 RX ADMIN — MIRABEGRON 25 MG: 25 TABLET, FILM COATED, EXTENDED RELEASE ORAL at 09:45

## 2019-08-13 ASSESSMENT — ACTIVITIES OF DAILY LIVING (ADL)
ADLS_ACUITY_SCORE: 38

## 2019-08-13 NOTE — PLAN OF CARE
Patient discharged back to [facility] with [facility] wheelchair transport.  Paper scripts for Tramadol printed and sent with discharge packet.   Personal belongings, including home medication dimethyl fumarate, gathered and sent with patient.  VSS. IV removed prior to discharge.

## 2019-08-13 NOTE — DISCHARGE SUMMARY
Discharge Summary    Wm Belcher MRN# [id number]   YOB: 1955 Age: 64 year old     Date of Admission:  8/5/2019  Date of Discharge:  8/13/2019  Admitting Physician:  Joe Culver MD  Discharge Physician:  Eulogio Rod MD  Discharging Service:  Hospitalist       Primary Provider: Faraz Wheat          Discharge Diagnosis:   1.  Severe sepsis with Proteus mirabilis bacteremia, due to suprapubic catheter associated urinary tract infection.      2.  Multiple sclerosis with neurogenic bladder.     3.  Dysphagia, on modified diet.      4.  Coronary artery disease, stable.      5.  Depression.      6.  Hypokalemia, replaced.      7. Acute anemia, mild, likely due to critical illness and phlebotomy. Stable.      8.  Thrombocytopenia, resolved.             Discharge Disposition:   Discharged to long-term care facility           Allergies:   Allergies   Allergen Reactions     Oxycodone Visual Disturbance                Condition on Discharge:   Discharge condition: Stable   Discharge vitals: Blood pressure (!) 143/79, pulse 90, temperature 98.7  F (37.1  C), temperature source Axillary, resp. rate 18, weight 77.7 kg (171 lb 3.2 oz), SpO2 92 %.   Code status on discharge: Full Code   Physical exam on day of discharge:   GENERAL:  Comfortable. Cooperative.  PSYCH: pleasant, oriented, No acute distress.  EYES: PERRLA, Normal conjunctiva.  HEART:  Regular rate and rhythm. No JVD. Pulses normal. No edema.  LUNGS:  Clear to auscultation, normal Respiratory effort.  ABDOMEN:  Soft, no hepatosplenomegaly, normal bowel sounds.  EXTREMETIES: No clubbing, cyanosis or ischemia  SKIN:  Dry to touch, No rash.         History of Present Illness and Hospital Course:     See detailed admission note for full details.  Wm Belcher is a 64 year old male with history of multiple sclerosis, neurogenic bladder with chronic suprapubic catheter, dysphagia with previous PEG (no longer in place),  aspiration pneumonia, CAD with previous stenting, and MDD. He presented to the ED on 8/5/2019 with fevers, chills, and cough. ED work up suggested sepsis due to UTI. He was initially admitted to the medical floor but then transferred to the ICU due to high fevers and changes in mental status consistent with acute encephalopathy.  He was initially treated with Ceftriaxone. He was also given Flagyl in case of aspiration pneumonia.  CT of chest did not show sign of PE or infiltrate.  He received fluid resuscitation and then eventually some Lasix as he developed signs of hypervolemia.  Blood cultures grew Proteus mirabilis and urine culture grew both Proteus mirabilis and Enterococcus faecalis.  Rocephin and Flagyl were changed to cefepime.  ID was consulted and Cefepime was changed to Zosyn.  Mental status improved and Wm was transferred to medical floor on 8/8/19.  SLP consulted and slowly advanced diet.  Wm continued to slowly improving. He is feeling well today and wants to discharge back to his long term care facility. ID is recommending one week of Augmentin on discharge.            Procedures / Imaging:   CT of chest  CXR           Consultations:   Consultation during this admission received from infectious disease             Pending Results:   None           Discharge Instructions and Follow-Up:   Discharge diet: Dysphagia diet level 1 with nectar thick liquids   Discharge activity: Activity as tolerated   Discharge follow-up: Follow up with nursing home physician in 1 week   Outpatient therapy: None    Other instructions: Routine suprapubic catheter care  Resume prior to admission care orders             Discharge Medications:   Current Discharge Medication List      START taking these medications    Details   amoxicillin-clavulanate (AUGMENTIN) 875-125 MG tablet Take 1 tablet by mouth 2 times daily for 7 days  Qty: 14 tablet    Associated Diagnoses: Sepsis, due to unspecified organism (H); Urinary  tract infection associated with indwelling urethral catheter, initial encounter (H)         CONTINUE these medications which have CHANGED    Details   metoprolol tartrate (LOPRESSOR) 25 MG tablet Take 0.5 tablets (12.5 mg) by mouth 2 times daily    Associated Diagnoses: Hypertension, unspecified type      !! traMADol (ULTRAM) 50 MG tablet Take 2 tablets (100 mg) by mouth 3 times daily  Qty: 15 tablet, Refills: 0    Associated Diagnoses: Chronic pain syndrome      !! traMADol (ULTRAM) 50 MG tablet Take 2 tablets (100 mg) by mouth daily as needed for pain  Qty: 5 tablet, Refills: 0    Associated Diagnoses: Pain       !! - Potential duplicate medications found. Please discuss with provider.      CONTINUE these medications which have NOT CHANGED    Details   !! acetaminophen (TYLENOL) 325 MG tablet Take 650 mg by mouth 3 times daily      !! acetaminophen (TYLENOL) 325 MG tablet Take 650 mg by mouth daily as needed for mild pain       albuterol (PROVENTIL) (2.5 MG/3ML) 0.083% neb solution Take 2.5 mg by nebulization every 4 hours as needed for shortness of breath / dyspnea or wheezing      aspirin (ASA) 81 MG chewable tablet Take 81 mg by mouth daily      atorvastatin (LIPITOR) 40 MG tablet Take 40 mg by mouth every evening      !! baclofen (LIORESAL) 10 MG tablet Take 30 mg by mouth 3 times daily      !! BACLOFEN PO Take 30 mg by mouth daily as needed for muscle spasms      bisacodyl (DULCOLAX) 10 MG suppository Place 10 mg rectally daily as needed for constipation      ciprofloxacin (CIPRO) 500 MG tablet Take 500 mg by mouth as needed (Give 500mg po BID day of catheter change per urologist Dr. Horner)      citalopram (CELEXA) 10 MG tablet Take 30 mg by mouth daily      Citric Jo-Rxmkmakwjug-Lf Carb (RENACIDIN) SOLN Irrigate with 30 mLs as directed twice a week Mon and Thur      diclofenac (VOLTAREN) 1 % topical gel Place 2 g onto the skin 3 times daily      dimethyl fumarate 240 MG CPDR Take 240 mg by mouth 2 times  daily      glycopyrrolate (ROBINUL) 1 MG tablet Take 1 mg by mouth 3 times daily      guaiFENesin (MUCINEX) 600 MG 12 hr tablet Take 1,200 mg by mouth 2 times daily      magnesium hydroxide (MILK OF MAGNESIA) 400 MG/5ML suspension Take 30 mLs by mouth nightly as needed for constipation or heartburn      methenamine mandelate 1 g TABS tablet Take 1,000 mg by mouth 2 times daily      mirabegron (MYRBETRIQ) 25 MG 24 hr tablet Take 25 mg by mouth daily      modafinil (PROVIGIL) 100 MG tablet Take 100 mg by mouth daily      nitroGLYcerin (NITROSTAT) 0.4 MG sublingual tablet Place 0.4 mg under the tongue every 5 minutes as needed for chest pain For chest pain place 1 tablet under the tongue every 5 minutes for 3 doses. If symptoms persist 5 minutes after 1st dose call 911.      polyethylene glycol (MIRALAX/GLYCOLAX) packet Take 1 packet by mouth daily      senna-docusate (SENOKOT-S/PERICOLACE) 8.6-50 MG tablet Take 1 tablet by mouth 2 times daily      vitamin D3 (CHOLECALCIFEROL) 1000 units (25 mcg) tablet Take 5,000 Units by mouth daily       !! - Potential duplicate medications found. Please discuss with provider.      STOP taking these medications       doxycycline hyclate (VIBRA-TABS) 100 MG tablet Comments:   Reason for Stopping:                  Total time spent in face to face contact with the patient and coordinating discharge was:  40 Minutes

## 2019-08-13 NOTE — PLAN OF CARE
Pt rested well this shift, Q2H repositioning, no bowel movement this shift, LS coarse and pt has loose congested cough - intermittent suctioning needed for phlegm, DD1 with nectar thick liquids, pt placed on O2 on previous shift - found pt with O2 tubing off - pt states he does not want to wear O2 - stating 92-93% on RA overnight, LR infusing at 10ml/hr, IV zosyn, discharge plan 2+ days, continue with plan of care.

## 2019-08-13 NOTE — PLAN OF CARE
VSS, pt was put on 2L O2 at HS to maintain saturation over 92%. Pt repositioned q2h. PIV running LR @ 10 ml/hr. L AC PIV was leaking, new PIV placed in L hand. No c/o pain, n/v. Pt coarse in lungs, respiratory paged for PRN neb. Superpubic cuevas patent. Pt tolerating diet- educated on safe swallowing techniques. Discharge plan pending.

## 2019-08-13 NOTE — PLAN OF CARE
OT: Pt with planned discharge back to [facility] today, transport scheduled for 1300.     Occupational Therapy Discharge Summary    Reason for therapy discharge:    Discharged to care facility     Progress towards therapy goal(s). See goals on Care Plan in [facility] electronic health record for goal details.  Goals not met.  Barriers to achieving goals:   discharge from facility.    Therapy recommendation(s):    Resumed assist for ADLs/IADLs and transfers at facility; OT for UE strengthening to resume I with feeding and light grooming cares.         **Pt not seen by discharging therapist on this date, note written based on previous treating therapist's notes and recommendations

## 2019-08-13 NOTE — PROGRESS NOTES
"[facility] Nurse Inpatient Pressure Injury Assessment   Reason for consultation: Evaluate and treat Coccyx      ASSESSMENT  Pressure Injury: found to Bilateral Buttocks , present on admission ,   Pressure Injury is healing Stage 3 or 4; dark red scab with improved status from deeper injury   Contributing factor of the pressure injury: pressure, microclimate, immobility and moisture  Status: follow up, evolving and stable  Recommend provider order: na     TREATMENT PLAN  Bilateral Buttocks wound: Q 3 days  1. Cleanse with wound spray/ NS and pat dry  2. Swab entire area with NO Sting barrier  3. Apply Mepilex dressing curve side DOWN, date  4. Swab outside with No sting to improve adherence  5. Pressure prevention: Pressure Injury Prevention (PIP) MEASURES:  1. If pt is refusing to turn or reposition they must be educated on the potential of injury due to not off loading.  Then this \"educated refusal\" needs to be documented as an \"educated refusal to turn/ reposition\" and document if alert, etc. Additionally, you MUST notify the charge nurse, nurse manager and the provider of the pt's refusal to reposition.  2. Follow Gamaliel Risk recommendations  ? Nutrition following  ? Moisture and Incontinence issues addressed, Sween/ Matthew to intact skin   3. CHAIR POSITIONING:  REPOSITIONING   Fully off load every 1-2 hours (stand x 5 minutes or back to bed)   Shift side to side every 15 minutes  Chair cushion ([id number]) when up to chair (pillow does not actually off load)  4.  BED:    POSTIONING  ? No direct supine positioning, position only side to side  ? Try to keep HOB below 30 degrees  ? Reposition every 1-2 hours  ? Keep heels elevated- one pillow under each leg from knee to heels, checking heels are free  ? Pulsate specialty mattress-     Orders reviewed  [facility] Nurse follow-up plan: pt is discharging today  Nursing to notify the Provider(s) and re-consult the [facility] Nurse if wound(s) deteriorates or new skin concern.    Patient " History  According to provider note(s):  64-year-old male with MS, indwelling Marie catheter who presents for evaluation of fevers. Is never reported as initial work-up was pertinent for lactic acid of 2.7 UA is positive for bacteriuria, 70 WBCs, moderate blood, large leukocyte esterase, consistent with a UTI, though there is possibility that he is just colonized.  Febrile on admission 102.2F.  Given tachycardia, lactic acidosis, fevers, with source of infection, patient meets severe sepsis criteria.    Objective Data  Containment of urine/stool: Suprapublic catheter    Current Diet/ Nutrition:  Orders Placed This Encounter      Dysphagia Diet Level 1 Pureed Nectar Thickened Liquids (pre-thickened or use instant food thickener)      Advance Diet as Tolerated      Output:   Intake/Output Summary (Last 24 hours) at 8/13/2019 1246  Last data filed at 8/13/2019 1133  Gross per 24 hour   Intake 350 ml   Output 1450 ml   Net -1100 ml       Risk Assessment:   Gamaliel Risk Assessment    Sensory Perception: 3-->slightly limited    Moisture: 3-->occasionally moist   Activity: 2-->chairfast     Mobility: 2-->very limited   Nutrition: 3-->adequate   Friction and Shear: 2-->potential problem  Gamaliel Score: 15     Labs:   Recent Labs   Lab Test 08/12/19  0645  08/06/19 1956   ALBUMIN  --   --  2.4*   HGB 11.1*   < > 11.6*   WBC 7.7   < > 5.9    < > = values in this interval not displayed.       Physical Exam  Skin inspection: Buttocks and overall condition  Patient is high risk for pressure injury development secondary to MS and moisture    Wound Location:  Bilateral Buttocks  Date of last Photo:   8/6/19     Wound History: see above;   Measurements (length x width x depth, in cm)   Right Buttocks: 4 x 1cm scabs have lifted, skin is very rough and intact   Left Buttocks: 3 cm x 2 cm scab is partially lifted, intact new pink skin  Wound Base: raw skin, and new pink epidermis with thin dry scab  Periwound skin: dry/scaly and  erythema- blanchable   Drainage:, none  Odor: none  Pain: denies ,     Interventions  Current support surface: Standard  will order a Pulsate   Current off-loading measures: Foam padding and Pillows under calves , heel lift boots and Chair cushion  Repositioning aid: Pillows  Visual inspection of wound(s) completed   Wound Care: was done per plan of care.  Supplies: at bedside, floor stock and discussed with RN  Educated provided: importance of repositioning and plan of care  Education provided to: nurse  Discussed importance of:repositioning every 2 hours, off-loading pressure to wound, their role in pressure injury prevention, head elevation <30 degrees, off-loading mattress and moisture management  Discussed plan of care with Nurse    Beatrice Michael RN

## 2019-08-13 NOTE — PROVIDER NOTIFICATION
08/06/19 1100   Final Resources   Resources List Skilled Nursing Facility   Skilled Nursing Facility [facility] [phone], Fax: [phone]     Pt has orders to discharge back to [facility] today. Call placed to [phone] to verify pt's return and update them on discharge plan. Facility was informed of pt's MRSA status. MD discharge orders faxed to fax # [phone]. Call placed to [facility] transport to arranged transportation back to facility. Pt has transportation arranged for 1300 today. Charge nurse and bedside nurse have been updated. LTC updated on transport time.     Pt will need to take his tecfidera meds (patient's own supply) back with him and bedside RN aware.    Isela Paez RN CTS

## 2019-08-14 ENCOUNTER — RECORDS - HEALTHEAST (OUTPATIENT)
Dept: LAB | Facility: CLINIC | Age: 64
End: 2019-08-14

## 2019-08-14 LAB
ANION GAP SERPL CALCULATED.3IONS-SCNC: 11 MMOL/L (ref 5–18)
BUN SERPL-MCNC: 8 MG/DL (ref 8–22)
CALCIUM SERPL-MCNC: 9.2 MG/DL (ref 8.5–10.5)
CHLORIDE BLD-SCNC: 107 MMOL/L (ref 98–107)
CO2 SERPL-SCNC: 22 MMOL/L (ref 22–31)
CREAT SERPL-MCNC: 0.58 MG/DL (ref 0.7–1.3)
GFR SERPL CREATININE-BSD FRML MDRD: >60 ML/MIN/1.73M2
GLUCOSE BLD-MCNC: 83 MG/DL (ref 70–125)
POTASSIUM BLD-SCNC: 3.4 MMOL/L (ref 3.5–5)
SODIUM SERPL-SCNC: 140 MMOL/L (ref 136–145)

## 2019-08-20 ENCOUNTER — RECORDS - HEALTHEAST (OUTPATIENT)
Dept: LAB | Facility: CLINIC | Age: 64
End: 2019-08-20

## 2019-08-21 LAB
ANION GAP SERPL CALCULATED.3IONS-SCNC: 8 MMOL/L (ref 5–18)
BUN SERPL-MCNC: 18 MG/DL (ref 8–22)
CALCIUM SERPL-MCNC: 9.8 MG/DL (ref 8.5–10.5)
CHLORIDE BLD-SCNC: 104 MMOL/L (ref 98–107)
CO2 SERPL-SCNC: 26 MMOL/L (ref 22–31)
CREAT SERPL-MCNC: 0.58 MG/DL (ref 0.7–1.3)
GFR SERPL CREATININE-BSD FRML MDRD: >60 ML/MIN/1.73M2
GLUCOSE BLD-MCNC: 99 MG/DL (ref 70–125)
POTASSIUM BLD-SCNC: 3.9 MMOL/L (ref 3.5–5)
SODIUM SERPL-SCNC: 138 MMOL/L (ref 136–145)

## 2019-08-23 ENCOUNTER — RECORDS - HEALTHEAST (OUTPATIENT)
Dept: LAB | Facility: CLINIC | Age: 64
End: 2019-08-23

## 2019-08-26 LAB
BASOPHILS # BLD AUTO: 0.1 THOU/UL (ref 0–0.2)
BASOPHILS NFR BLD AUTO: 1 % (ref 0–2)
EOSINOPHIL # BLD AUTO: 0.2 THOU/UL (ref 0–0.4)
EOSINOPHIL NFR BLD AUTO: 4 % (ref 0–6)
ERYTHROCYTE [DISTWIDTH] IN BLOOD BY AUTOMATED COUNT: 16.1 % (ref 11–14.5)
HCT VFR BLD AUTO: 36.8 % (ref 40–54)
HGB BLD-MCNC: 11.6 G/DL (ref 14–18)
LYMPHOCYTES # BLD AUTO: 1.1 THOU/UL (ref 0.8–4.4)
LYMPHOCYTES NFR BLD AUTO: 16 % (ref 20–40)
MCH RBC QN AUTO: 29.1 PG (ref 27–34)
MCHC RBC AUTO-ENTMCNC: 31.5 G/DL (ref 32–36)
MCV RBC AUTO: 93 FL (ref 80–100)
MONOCYTES # BLD AUTO: 0.7 THOU/UL (ref 0–0.9)
MONOCYTES NFR BLD AUTO: 10 % (ref 2–10)
NEUTROPHILS # BLD AUTO: 4.8 THOU/UL (ref 2–7.7)
NEUTROPHILS NFR BLD AUTO: 70 % (ref 50–70)
PLATELET # BLD AUTO: 293 THOU/UL (ref 140–440)
PMV BLD AUTO: 9.9 FL (ref 8.5–12.5)
RBC # BLD AUTO: 3.98 MILL/UL (ref 4.4–6.2)
WBC: 6.9 THOU/UL (ref 4–11)

## 2019-08-28 ENCOUNTER — RECORDS - HEALTHEAST (OUTPATIENT)
Dept: LAB | Facility: CLINIC | Age: 64
End: 2019-08-28

## 2019-08-28 LAB
ANION GAP SERPL CALCULATED.3IONS-SCNC: 8 MMOL/L (ref 5–18)
BASOPHILS # BLD AUTO: 0.1 THOU/UL (ref 0–0.2)
BASOPHILS NFR BLD AUTO: 1 % (ref 0–2)
BUN SERPL-MCNC: 21 MG/DL (ref 8–22)
CALCIUM SERPL-MCNC: 9.3 MG/DL (ref 8.5–10.5)
CHLORIDE BLD-SCNC: 105 MMOL/L (ref 98–107)
CO2 SERPL-SCNC: 28 MMOL/L (ref 22–31)
CREAT SERPL-MCNC: 0.62 MG/DL (ref 0.7–1.3)
EOSINOPHIL # BLD AUTO: 0.1 THOU/UL (ref 0–0.4)
EOSINOPHIL NFR BLD AUTO: 2 % (ref 0–6)
ERYTHROCYTE [DISTWIDTH] IN BLOOD BY AUTOMATED COUNT: 16.2 % (ref 11–14.5)
GFR SERPL CREATININE-BSD FRML MDRD: >60 ML/MIN/1.73M2
GLUCOSE BLD-MCNC: 120 MG/DL (ref 70–125)
HCT VFR BLD AUTO: 37.6 % (ref 40–54)
HGB BLD-MCNC: 11.8 G/DL (ref 14–18)
LYMPHOCYTES # BLD AUTO: 0.8 THOU/UL (ref 0.8–4.4)
LYMPHOCYTES NFR BLD AUTO: 11 % (ref 20–40)
MCH RBC QN AUTO: 29.1 PG (ref 27–34)
MCHC RBC AUTO-ENTMCNC: 31.4 G/DL (ref 32–36)
MCV RBC AUTO: 93 FL (ref 80–100)
MONOCYTES # BLD AUTO: 0.5 THOU/UL (ref 0–0.9)
MONOCYTES NFR BLD AUTO: 7 % (ref 2–10)
NEUTROPHILS # BLD AUTO: 5.6 THOU/UL (ref 2–7.7)
NEUTROPHILS NFR BLD AUTO: 79 % (ref 50–70)
PLATELET # BLD AUTO: 284 THOU/UL (ref 140–440)
PMV BLD AUTO: 10 FL (ref 8.5–12.5)
POTASSIUM BLD-SCNC: 3.9 MMOL/L (ref 3.5–5)
RBC # BLD AUTO: 4.06 MILL/UL (ref 4.4–6.2)
SODIUM SERPL-SCNC: 141 MMOL/L (ref 136–145)
WBC: 7.1 THOU/UL (ref 4–11)

## 2019-09-04 ENCOUNTER — RECORDS - HEALTHEAST (OUTPATIENT)
Dept: LAB | Facility: CLINIC | Age: 64
End: 2019-09-04

## 2019-09-04 LAB
ANION GAP SERPL CALCULATED.3IONS-SCNC: 8 MMOL/L (ref 5–18)
BUN SERPL-MCNC: 15 MG/DL (ref 8–22)
CALCIUM SERPL-MCNC: 9.6 MG/DL (ref 8.5–10.5)
CHLORIDE BLD-SCNC: 103 MMOL/L (ref 98–107)
CO2 SERPL-SCNC: 28 MMOL/L (ref 22–31)
CREAT SERPL-MCNC: 0.62 MG/DL (ref 0.7–1.3)
GFR SERPL CREATININE-BSD FRML MDRD: >60 ML/MIN/1.73M2
GLUCOSE BLD-MCNC: 81 MG/DL (ref 70–125)
POTASSIUM BLD-SCNC: 4.3 MMOL/L (ref 3.5–5)
SODIUM SERPL-SCNC: 139 MMOL/L (ref 136–145)

## 2019-09-10 ENCOUNTER — RECORDS - HEALTHEAST (OUTPATIENT)
Dept: LAB | Facility: CLINIC | Age: 64
End: 2019-09-10

## 2019-09-10 LAB
ALBUMIN SERPL-MCNC: 3.2 G/DL (ref 3.5–5)
ANION GAP SERPL CALCULATED.3IONS-SCNC: 7 MMOL/L (ref 5–18)
BASOPHILS # BLD AUTO: 0.1 THOU/UL (ref 0–0.2)
BASOPHILS NFR BLD AUTO: 1 % (ref 0–2)
BUN SERPL-MCNC: 12 MG/DL (ref 8–22)
CALCIUM SERPL-MCNC: 9.8 MG/DL (ref 8.5–10.5)
CHLORIDE BLD-SCNC: 104 MMOL/L (ref 98–107)
CO2 SERPL-SCNC: 28 MMOL/L (ref 22–31)
CREAT SERPL-MCNC: 0.6 MG/DL (ref 0.7–1.3)
EOSINOPHIL # BLD AUTO: 0.2 THOU/UL (ref 0–0.4)
EOSINOPHIL NFR BLD AUTO: 4 % (ref 0–6)
ERYTHROCYTE [DISTWIDTH] IN BLOOD BY AUTOMATED COUNT: 16.2 % (ref 11–14.5)
GFR SERPL CREATININE-BSD FRML MDRD: >60 ML/MIN/1.73M2
GLUCOSE BLD-MCNC: 107 MG/DL (ref 70–125)
HCT VFR BLD AUTO: 40.1 % (ref 40–54)
HGB BLD-MCNC: 12.3 G/DL (ref 14–18)
LYMPHOCYTES # BLD AUTO: 1 THOU/UL (ref 0.8–4.4)
LYMPHOCYTES NFR BLD AUTO: 21 % (ref 20–40)
MCH RBC QN AUTO: 29.3 PG (ref 27–34)
MCHC RBC AUTO-ENTMCNC: 30.7 G/DL (ref 32–36)
MCV RBC AUTO: 96 FL (ref 80–100)
MONOCYTES # BLD AUTO: 0.4 THOU/UL (ref 0–0.9)
MONOCYTES NFR BLD AUTO: 9 % (ref 2–10)
NEUTROPHILS # BLD AUTO: 2.9 THOU/UL (ref 2–7.7)
NEUTROPHILS NFR BLD AUTO: 65 % (ref 50–70)
PLATELET # BLD AUTO: 296 THOU/UL (ref 140–440)
PMV BLD AUTO: 9.7 FL (ref 8.5–12.5)
POTASSIUM BLD-SCNC: 4 MMOL/L (ref 3.5–5)
RBC # BLD AUTO: 4.2 MILL/UL (ref 4.4–6.2)
SODIUM SERPL-SCNC: 139 MMOL/L (ref 136–145)
WBC: 4.6 THOU/UL (ref 4–11)

## 2019-09-11 LAB
ANION GAP SERPL CALCULATED.3IONS-SCNC: 9 MMOL/L (ref 5–18)
BUN SERPL-MCNC: 12 MG/DL (ref 8–22)
CALCIUM SERPL-MCNC: 9.9 MG/DL (ref 8.5–10.5)
CHLORIDE BLD-SCNC: 104 MMOL/L (ref 98–107)
CO2 SERPL-SCNC: 27 MMOL/L (ref 22–31)
CREAT SERPL-MCNC: 0.62 MG/DL (ref 0.7–1.3)
GFR SERPL CREATININE-BSD FRML MDRD: >60 ML/MIN/1.73M2
GLUCOSE BLD-MCNC: 113 MG/DL (ref 70–125)
POTASSIUM BLD-SCNC: 4 MMOL/L (ref 3.5–5)
SODIUM SERPL-SCNC: 140 MMOL/L (ref 136–145)

## 2019-09-17 ENCOUNTER — RECORDS - HEALTHEAST (OUTPATIENT)
Dept: LAB | Facility: CLINIC | Age: 64
End: 2019-09-17

## 2019-09-18 LAB
ANION GAP SERPL CALCULATED.3IONS-SCNC: 6 MMOL/L (ref 5–18)
BUN SERPL-MCNC: 18 MG/DL (ref 8–22)
CALCIUM SERPL-MCNC: 9.8 MG/DL (ref 8.5–10.5)
CHLORIDE BLD-SCNC: 103 MMOL/L (ref 98–107)
CO2 SERPL-SCNC: 29 MMOL/L (ref 22–31)
CREAT SERPL-MCNC: 0.7 MG/DL (ref 0.7–1.3)
GFR SERPL CREATININE-BSD FRML MDRD: >60 ML/MIN/1.73M2
GLUCOSE BLD-MCNC: 102 MG/DL (ref 70–125)
POTASSIUM BLD-SCNC: 3.9 MMOL/L (ref 3.5–5)
SODIUM SERPL-SCNC: 138 MMOL/L (ref 136–145)

## 2019-09-25 ENCOUNTER — RECORDS - HEALTHEAST (OUTPATIENT)
Dept: LAB | Facility: CLINIC | Age: 64
End: 2019-09-25

## 2019-09-25 LAB
ANION GAP SERPL CALCULATED.3IONS-SCNC: 7 MMOL/L (ref 5–18)
BUN SERPL-MCNC: 14 MG/DL (ref 8–22)
CALCIUM SERPL-MCNC: 9.4 MG/DL (ref 8.5–10.5)
CHLORIDE BLD-SCNC: 104 MMOL/L (ref 98–107)
CO2 SERPL-SCNC: 28 MMOL/L (ref 22–31)
CREAT SERPL-MCNC: 0.63 MG/DL (ref 0.7–1.3)
GFR SERPL CREATININE-BSD FRML MDRD: >60 ML/MIN/1.73M2
GLUCOSE BLD-MCNC: 84 MG/DL (ref 70–125)
POTASSIUM BLD-SCNC: 4.3 MMOL/L (ref 3.5–5)
SODIUM SERPL-SCNC: 139 MMOL/L (ref 136–145)

## 2019-10-02 ENCOUNTER — RECORDS - HEALTHEAST (OUTPATIENT)
Dept: LAB | Facility: CLINIC | Age: 64
End: 2019-10-02

## 2019-10-02 LAB
ANION GAP SERPL CALCULATED.3IONS-SCNC: 7 MMOL/L (ref 5–18)
BUN SERPL-MCNC: 22 MG/DL (ref 8–22)
CALCIUM SERPL-MCNC: 9.8 MG/DL (ref 8.5–10.5)
CHLORIDE BLD-SCNC: 102 MMOL/L (ref 98–107)
CO2 SERPL-SCNC: 29 MMOL/L (ref 22–31)
CREAT SERPL-MCNC: 0.63 MG/DL (ref 0.7–1.3)
GFR SERPL CREATININE-BSD FRML MDRD: >60 ML/MIN/1.73M2
GLUCOSE BLD-MCNC: 84 MG/DL (ref 70–125)
POTASSIUM BLD-SCNC: 4 MMOL/L (ref 3.5–5)
SODIUM SERPL-SCNC: 138 MMOL/L (ref 136–145)

## 2019-10-09 ENCOUNTER — RECORDS - HEALTHEAST (OUTPATIENT)
Dept: LAB | Facility: CLINIC | Age: 64
End: 2019-10-09

## 2019-10-09 LAB
ANION GAP SERPL CALCULATED.3IONS-SCNC: 7 MMOL/L (ref 5–18)
BUN SERPL-MCNC: 21 MG/DL (ref 8–22)
CALCIUM SERPL-MCNC: 9.7 MG/DL (ref 8.5–10.5)
CHLORIDE BLD-SCNC: 102 MMOL/L (ref 98–107)
CO2 SERPL-SCNC: 28 MMOL/L (ref 22–31)
CREAT SERPL-MCNC: 0.59 MG/DL (ref 0.7–1.3)
GFR SERPL CREATININE-BSD FRML MDRD: >60 ML/MIN/1.73M2
GLUCOSE BLD-MCNC: 81 MG/DL (ref 70–125)
POTASSIUM BLD-SCNC: 4 MMOL/L (ref 3.5–5)
SODIUM SERPL-SCNC: 137 MMOL/L (ref 136–145)

## 2019-10-16 ENCOUNTER — RECORDS - HEALTHEAST (OUTPATIENT)
Dept: LAB | Facility: CLINIC | Age: 64
End: 2019-10-16

## 2019-10-16 LAB
ANION GAP SERPL CALCULATED.3IONS-SCNC: 7 MMOL/L (ref 5–18)
BUN SERPL-MCNC: 23 MG/DL (ref 8–22)
CALCIUM SERPL-MCNC: 9.9 MG/DL (ref 8.5–10.5)
CHLORIDE BLD-SCNC: 102 MMOL/L (ref 98–107)
CO2 SERPL-SCNC: 28 MMOL/L (ref 22–31)
CREAT SERPL-MCNC: 0.62 MG/DL (ref 0.7–1.3)
GFR SERPL CREATININE-BSD FRML MDRD: >60 ML/MIN/1.73M2
GLUCOSE BLD-MCNC: 79 MG/DL (ref 70–125)
POTASSIUM BLD-SCNC: 3.6 MMOL/L (ref 3.5–5)
SODIUM SERPL-SCNC: 137 MMOL/L (ref 136–145)

## 2019-10-17 LAB — 25(OH)D3 SERPL-MCNC: 63.1 NG/ML (ref 30–80)

## 2019-12-19 ENCOUNTER — RECORDS - HEALTHEAST (OUTPATIENT)
Dept: LAB | Facility: CLINIC | Age: 64
End: 2019-12-19

## 2019-12-19 LAB
25(OH)D3 SERPL-MCNC: 43.6 NG/ML (ref 30–80)
ALT SERPL W P-5'-P-CCNC: 27 U/L (ref 0–45)
AST SERPL W P-5'-P-CCNC: 21 U/L (ref 0–40)
BASOPHILS # BLD AUTO: 0.1 THOU/UL (ref 0–0.2)
BASOPHILS NFR BLD AUTO: 1 % (ref 0–2)
CALCIUM SERPL-MCNC: 9.6 MG/DL (ref 8.5–10.5)
CREAT SERPL-MCNC: 0.61 MG/DL (ref 0.7–1.3)
EOSINOPHIL # BLD AUTO: 0.2 THOU/UL (ref 0–0.4)
EOSINOPHIL NFR BLD AUTO: 3 % (ref 0–6)
ERYTHROCYTE [DISTWIDTH] IN BLOOD BY AUTOMATED COUNT: 15.6 % (ref 11–14.5)
GFR SERPL CREATININE-BSD FRML MDRD: >60 ML/MIN/1.73M2
HCT VFR BLD AUTO: 38.7 % (ref 40–54)
HGB BLD-MCNC: 12.3 G/DL (ref 14–18)
LYMPHOCYTES # BLD AUTO: 1 THOU/UL (ref 0.8–4.4)
LYMPHOCYTES NFR BLD AUTO: 15 % (ref 20–40)
MCH RBC QN AUTO: 28.3 PG (ref 27–34)
MCHC RBC AUTO-ENTMCNC: 31.8 G/DL (ref 32–36)
MCV RBC AUTO: 89 FL (ref 80–100)
MONOCYTES # BLD AUTO: 0.6 THOU/UL (ref 0–0.9)
MONOCYTES NFR BLD AUTO: 8 % (ref 2–10)
NEUTROPHILS # BLD AUTO: 4.9 THOU/UL (ref 2–7.7)
NEUTROPHILS NFR BLD AUTO: 73 % (ref 50–70)
PLATELET # BLD AUTO: 280 THOU/UL (ref 140–440)
PMV BLD AUTO: 9.6 FL (ref 8.5–12.5)
RBC # BLD AUTO: 4.34 MILL/UL (ref 4.4–6.2)
WBC: 6.7 THOU/UL (ref 4–11)

## 2020-07-15 ENCOUNTER — RECORDS - HEALTHEAST (OUTPATIENT)
Dept: LAB | Facility: CLINIC | Age: 65
End: 2020-07-15

## 2020-07-16 LAB
ALT SERPL W P-5'-P-CCNC: 56 U/L (ref 0–45)
AST SERPL W P-5'-P-CCNC: 30 U/L (ref 0–40)
BASOPHILS # BLD AUTO: 0 THOU/UL (ref 0–0.2)
BASOPHILS NFR BLD AUTO: 1 % (ref 0–2)
CALCIUM SERPL-MCNC: 9.8 MG/DL (ref 8.5–10.5)
CREAT SERPL-MCNC: 0.59 MG/DL (ref 0.7–1.3)
EOSINOPHIL # BLD AUTO: 0.1 THOU/UL (ref 0–0.4)
EOSINOPHIL NFR BLD AUTO: 2 % (ref 0–6)
ERYTHROCYTE [DISTWIDTH] IN BLOOD BY AUTOMATED COUNT: 14.6 % (ref 11–14.5)
GFR SERPL CREATININE-BSD FRML MDRD: >60 ML/MIN/1.73M2
HCT VFR BLD AUTO: 46 % (ref 40–54)
HGB BLD-MCNC: 14.7 G/DL (ref 14–18)
LYMPHOCYTES # BLD AUTO: 0.8 THOU/UL (ref 0.8–4.4)
LYMPHOCYTES NFR BLD AUTO: 14 % (ref 20–40)
MCH RBC QN AUTO: 29.2 PG (ref 27–34)
MCHC RBC AUTO-ENTMCNC: 32 G/DL (ref 32–36)
MCV RBC AUTO: 91 FL (ref 80–100)
MONOCYTES # BLD AUTO: 0.5 THOU/UL (ref 0–0.9)
MONOCYTES NFR BLD AUTO: 8 % (ref 2–10)
NEUTROPHILS # BLD AUTO: 4.6 THOU/UL (ref 2–7.7)
NEUTROPHILS NFR BLD AUTO: 75 % (ref 50–70)
PLATELET # BLD AUTO: 268 THOU/UL (ref 140–440)
PMV BLD AUTO: 10.2 FL (ref 8.5–12.5)
RBC # BLD AUTO: 5.04 MILL/UL (ref 4.4–6.2)
WBC: 6.1 THOU/UL (ref 4–11)

## 2020-07-17 LAB — 25(OH)D3 SERPL-MCNC: 51.2 NG/ML (ref 30–80)

## 2021-01-13 ENCOUNTER — RECORDS - HEALTHEAST (OUTPATIENT)
Dept: LAB | Facility: CLINIC | Age: 66
End: 2021-01-13

## 2021-01-14 LAB
ALT SERPL W P-5'-P-CCNC: 39 U/L (ref 0–45)
AST SERPL W P-5'-P-CCNC: 23 U/L (ref 0–40)
BASOPHILS # BLD AUTO: 0.1 THOU/UL (ref 0–0.2)
BASOPHILS NFR BLD AUTO: 1 % (ref 0–2)
CALCIUM SERPL-MCNC: 9.9 MG/DL (ref 8.5–10.5)
CREAT SERPL-MCNC: 0.58 MG/DL (ref 0.7–1.3)
EOSINOPHIL # BLD AUTO: 0.2 THOU/UL (ref 0–0.4)
EOSINOPHIL NFR BLD AUTO: 3 % (ref 0–6)
ERYTHROCYTE [DISTWIDTH] IN BLOOD BY AUTOMATED COUNT: 14.7 % (ref 11–14.5)
GFR SERPL CREATININE-BSD FRML MDRD: >60 ML/MIN/1.73M2
HCT VFR BLD AUTO: 42.2 % (ref 40–54)
HGB BLD-MCNC: 14 G/DL (ref 14–18)
IMM GRANULOCYTES # BLD: 0 THOU/UL
IMM GRANULOCYTES NFR BLD: 0 %
LYMPHOCYTES # BLD AUTO: 1 THOU/UL (ref 0.8–4.4)
LYMPHOCYTES NFR BLD AUTO: 13 % (ref 20–40)
MCH RBC QN AUTO: 30.5 PG (ref 27–34)
MCHC RBC AUTO-ENTMCNC: 33.2 G/DL (ref 32–36)
MCV RBC AUTO: 92 FL (ref 80–100)
MONOCYTES # BLD AUTO: 0.7 THOU/UL (ref 0–0.9)
MONOCYTES NFR BLD AUTO: 9 % (ref 2–10)
NEUTROPHILS # BLD AUTO: 5.5 THOU/UL (ref 2–7.7)
NEUTROPHILS NFR BLD AUTO: 75 % (ref 50–70)
PLATELET # BLD AUTO: 330 THOU/UL (ref 140–440)
PMV BLD AUTO: 9.7 FL (ref 8.5–12.5)
RBC # BLD AUTO: 4.59 MILL/UL (ref 4.4–6.2)
WBC: 7.4 THOU/UL (ref 4–11)

## 2021-01-15 LAB — 25(OH)D3 SERPL-MCNC: 47.7 NG/ML (ref 30–80)

## 2021-10-21 ENCOUNTER — LAB REQUISITION (OUTPATIENT)
Dept: LAB | Facility: CLINIC | Age: 66
End: 2021-10-21
Payer: MEDICARE

## 2021-10-22 LAB
ANION GAP SERPL CALCULATED.3IONS-SCNC: 6 MMOL/L (ref 5–18)
BUN SERPL-MCNC: 17 MG/DL (ref 8–22)
CALCIUM SERPL-MCNC: 9.4 MG/DL (ref 8.5–10.5)
CHLORIDE BLD-SCNC: 98 MMOL/L (ref 98–107)
CO2 SERPL-SCNC: 32 MMOL/L (ref 22–31)
CREAT SERPL-MCNC: 0.53 MG/DL (ref 0.7–1.3)
ERYTHROCYTE [DISTWIDTH] IN BLOOD BY AUTOMATED COUNT: 14.6 % (ref 10–15)
GFR SERPL CREATININE-BSD FRML MDRD: >90 ML/MIN/1.73M2
GLUCOSE BLD-MCNC: 134 MG/DL (ref 70–125)
HCT VFR BLD AUTO: 39.3 % (ref 40–53)
HGB BLD-MCNC: 12.6 G/DL (ref 13.3–17.7)
MCH RBC QN AUTO: 30 PG (ref 26.5–33)
MCHC RBC AUTO-ENTMCNC: 32.1 G/DL (ref 31.5–36.5)
MCV RBC AUTO: 94 FL (ref 78–100)
PLATELET # BLD AUTO: 262 10E3/UL (ref 150–450)
POTASSIUM BLD-SCNC: 4.3 MMOL/L (ref 3.5–5)
RBC # BLD AUTO: 4.2 10E6/UL (ref 4.4–5.9)
SODIUM SERPL-SCNC: 136 MMOL/L (ref 136–145)
WBC # BLD AUTO: 5 10E3/UL (ref 4–11)

## 2021-10-22 PROCEDURE — 36415 COLL VENOUS BLD VENIPUNCTURE: CPT | Mod: ORL | Performed by: NURSE PRACTITIONER

## 2021-10-22 PROCEDURE — P9603 ONE-WAY ALLOW PRORATED MILES: HCPCS | Mod: ORL | Performed by: NURSE PRACTITIONER

## 2021-10-22 PROCEDURE — 85027 COMPLETE CBC AUTOMATED: CPT | Mod: ORL | Performed by: NURSE PRACTITIONER

## 2021-10-22 PROCEDURE — 80048 BASIC METABOLIC PNL TOTAL CA: CPT | Mod: ORL | Performed by: NURSE PRACTITIONER

## 2023-01-01 NOTE — PROGRESS NOTES
Infection Prevention:    Patient requires Contact precautions because of MRSA: nares, 8/6/19. Please contact Infection Prevention with any questions/concerns at *[phone].    Yesy Blankenship, ICP    Neg 2023/COVID